# Patient Record
Sex: MALE | Race: BLACK OR AFRICAN AMERICAN | Employment: OTHER | URBAN - METROPOLITAN AREA
[De-identification: names, ages, dates, MRNs, and addresses within clinical notes are randomized per-mention and may not be internally consistent; named-entity substitution may affect disease eponyms.]

---

## 2017-02-28 RX ORDER — SIMVASTATIN 40 MG/1
TABLET, FILM COATED ORAL
Qty: 30 TAB | Refills: 5 | Status: SHIPPED | OUTPATIENT
Start: 2017-02-28 | End: 2017-09-02 | Stop reason: SDUPTHER

## 2017-06-06 ENCOUNTER — OFFICE VISIT (OUTPATIENT)
Dept: CARDIOLOGY CLINIC | Age: 82
End: 2017-06-06

## 2017-06-06 ENCOUNTER — CLINICAL SUPPORT (OUTPATIENT)
Dept: CARDIOLOGY CLINIC | Age: 82
End: 2017-06-06

## 2017-06-06 VITALS
DIASTOLIC BLOOD PRESSURE: 84 MMHG | BODY MASS INDEX: 29.1 KG/M2 | HEIGHT: 67 IN | WEIGHT: 185.4 LBS | OXYGEN SATURATION: 98 % | HEART RATE: 68 BPM | RESPIRATION RATE: 16 BRPM | SYSTOLIC BLOOD PRESSURE: 140 MMHG

## 2017-06-06 DIAGNOSIS — E78.00 PURE HYPERCHOLESTEROLEMIA: ICD-10-CM

## 2017-06-06 DIAGNOSIS — I10 ESSENTIAL HYPERTENSION, BENIGN: ICD-10-CM

## 2017-06-06 DIAGNOSIS — I25.10 CORONARY ARTERY DISEASE INVOLVING NATIVE CORONARY ARTERY OF NATIVE HEART WITHOUT ANGINA PECTORIS: Primary | ICD-10-CM

## 2017-06-06 DIAGNOSIS — I25.10 CORONARY ARTERY DISEASE INVOLVING NATIVE CORONARY ARTERY OF NATIVE HEART WITHOUT ANGINA PECTORIS: ICD-10-CM

## 2017-06-06 DIAGNOSIS — Q24.8 LEFT VENTRICULAR OUTFLOW TRACT OBSTRUCTION: ICD-10-CM

## 2017-06-06 DIAGNOSIS — Z95.1 S/P CABG (CORONARY ARTERY BYPASS GRAFT): ICD-10-CM

## 2017-06-06 NOTE — PROGRESS NOTES
Abril Ramsey     1935       Reny Michaud MD, [facility] [place]  Date of Visit-6/6/2017   PCP is No primary care provider on file. [facility] [place]  [facility]  HPI:  Abril Ramsey is a [de-identified] y.o. male   CAD,HTN. LVOT gradient  Denies chest pain, edema, syncope or shortness of breath at rest   Has no tachycardia , palpitations or sense of arrythmia     Exercise stress echo is normal today, walked 6 minutes  Some EKG changes at baseline non ischemic  Good wall motion  EKG: NSR T wave inversions diffusely  Assessment/Plan:     CAD CABG  ---no angina  RUFINO is good  LVOT gradient with murmur--now asymptomatic on beta blocker  Lipds on statin  HTN at goal  No pcp, declines one  See us one year     Impression:   1. 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 Left ventricular outflow tract obstruction    3. Hypercholesterolemia    4. Essential hypertension, benign    5. S/P CABG (coronary artery bypass graft)       Cardiac History:   heart cath in 2003 showed EF 50-60% with bypass done in 2000 and echocardiogram done in 2001 showing LVH, normal wall motion and no significant valve disease. ECHO 3-= Left ventricle: Systolic function was hyperdynamic. Ejection fraction was  estimated to be 80 %. There was a mid cavity outflow tract obstruction:  rest gradient was 20 mmHg max and valsalva gradient ranged from 80-90 mmHg  max. Septal wall thickness was markedly increased. Tricuspid valve: There was mild regurgitation. COMPARISONS:  Comparison was made with the previous study of 01-Oct-2001. At that time  turbulence in the LVOT was noted but not able to discern in detail, this  study confirms significant dynamic obstructive outflow physiology     ROS-except as noted above. . A complete cardiac and respiratory are reviewed and negative except as above ; Resp-denies wheezing  or productive cough,.  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 He has never used smokeless tobacco. He reports that he does not drink alcohol or use illicit drugs. Exam and Labs:    Visit Vitals    /84 (BP 1 Location: Left arm, BP Patient Position: Sitting)    Pulse 68    Resp 16    Ht 5' 7\" (1.702 m)    Wt 185 lb 6.4 oz (84.1 kg)    SpO2 98%    BMI 29.04 kg/m2      Constitutional:  NAD, comfortable  Head: NC,AT. Eyes: No scleral icterus. Neck:  Neck supple. No JVD present. Throat: moist mucous membranes. Chest: Effort normal & normal respiratory excursion . Neurological: alert, conversant and oriented . Skin: Skin is not cold. No obvious systemic rash noted. Not diaphoretic. No erythema. Psychiatric:  Grossly normal mood and affect. Behavior appears normal. Extremities:  no clubbing or cyanosis. Abdomen: non distended    Lungs:breath sounds normal. No stridor. distress, wheezes or  Rales. Heart:    normal rate and regular rhythm, no gallops noted, systolic murmur 2/6 at 2nd left intercostal space and at 2nd right intercostal space, no JVD , PMI non displaced. Edema: Edema is none. Wt Readings from Last 3 Encounters:   06/06/17 185 lb 6.4 oz (84.1 kg)   12/08/16 185 lb 3.2 oz (84 kg)   06/02/16 185 lb (83.9 kg)      BP Readings from Last 3 Encounters:   06/06/17 140/84   12/08/16 128/46   06/02/16 138/70      Current Outpatient Prescriptions   Medication Sig    simvastatin (ZOCOR) 40 mg tablet TAKE 1 TABLET BY MOUTH AT NIGHT.     metoprolol succinate (TOPROL-XL) 50 mg XL tablet TAKE 1 TABLET BY MOUTH DAILY.  aspirin delayed-release 81 mg tablet Take  by mouth daily. No current facility-administered medications for this visit. Impression see above.

## 2017-07-25 RX ORDER — METOPROLOL SUCCINATE 50 MG/1
TABLET, EXTENDED RELEASE ORAL
Qty: 90 TAB | Refills: 1 | Status: SHIPPED | OUTPATIENT
Start: 2017-07-25 | End: 2018-01-22 | Stop reason: SDUPTHER

## 2017-07-25 NOTE — TELEPHONE ENCOUNTER
Request for metoprolol 50mg every day. Last office visit 6/6/17, next office visit 6/7/18.  Refills per verbal order from Dr. Leona Malik.

## 2018-01-23 RX ORDER — METOPROLOL SUCCINATE 50 MG/1
TABLET, EXTENDED RELEASE ORAL
Qty: 90 TAB | Refills: 1 | Status: SHIPPED | OUTPATIENT
Start: 2018-01-23 | End: 2018-07-23 | Stop reason: SDUPTHER

## 2018-01-23 NOTE — TELEPHONE ENCOUNTER
Request for metoprolol succinate 50 mg XL tablet, daily. Last office visit 6/6/17,   Next office visit 6/7/18.      Refills per verbal order from Dr. Jigar Nowak.

## 2018-03-12 RX ORDER — SIMVASTATIN 40 MG/1
TABLET, FILM COATED ORAL
Qty: 30 TAB | Refills: 5 | Status: SHIPPED | OUTPATIENT
Start: 2018-03-12 | End: 2018-09-08 | Stop reason: SDUPTHER

## 2018-03-12 NOTE — TELEPHONE ENCOUNTER
Request for simvastatin 40 mg tab, nightly. Last office visit 6/6/17,   Next office visit 6/7/18.      Refills per verbal order from Dr. Shi Pool.

## 2018-06-07 ENCOUNTER — OFFICE VISIT (OUTPATIENT)
Dept: CARDIOLOGY CLINIC | Age: 83
End: 2018-06-07

## 2018-06-07 VITALS
RESPIRATION RATE: 16 BRPM | DIASTOLIC BLOOD PRESSURE: 60 MMHG | HEIGHT: 67 IN | SYSTOLIC BLOOD PRESSURE: 120 MMHG | OXYGEN SATURATION: 98 % | BODY MASS INDEX: 26.53 KG/M2 | WEIGHT: 169 LBS

## 2018-06-07 DIAGNOSIS — I25.10 CORONARY ARTERY DISEASE INVOLVING NATIVE CORONARY ARTERY OF NATIVE HEART WITHOUT ANGINA PECTORIS: Primary | ICD-10-CM

## 2018-06-07 DIAGNOSIS — I10 ESSENTIAL HYPERTENSION, BENIGN: ICD-10-CM

## 2018-06-07 DIAGNOSIS — Z95.1 S/P CABG (CORONARY ARTERY BYPASS GRAFT): ICD-10-CM

## 2018-06-07 DIAGNOSIS — Q24.8 LEFT VENTRICULAR OUTFLOW TRACT OBSTRUCTION: ICD-10-CM

## 2018-06-07 DIAGNOSIS — E78.00 PURE HYPERCHOLESTEROLEMIA: ICD-10-CM

## 2018-06-07 NOTE — PROGRESS NOTES
Rickie Easley     1935       Reny Haynes MD, [facility] [place]  Date of Visit-6/7/2018   PCP is No primary care provider on file. [facility] [place]  [facility]  HPI:  Rickie Easley is a 81 y.o. male   CAD hypertension, LVOT gradient. Last visit RUFINO 6 minutes, normal.    Overall the pt states he is doing well. The pt states that he does well exerting himself. He has not had any changes in medications. The pt reports that he is walking 4 laps around the mall regularly and does well with this. 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 Denies chest pain, edema, syncope or shortness of breath at rest, has no tachycardia, palpitations or sense of arrhythmia. EKG: Sinus rhythm with LVH and T wave inversions, unchanged. Assessment/Plan:     1. CAD s/p CABG no angina, tolerating meds well, had a normal stress echo. 2. LVOT gradient, asymptomatic on BB    3. Dyslipidemia on statin he is content on the simvastatin and see no reason to change. 4. Hypertension at goal     5. Declinesto get a  PCP we will check labs, CMP, CBC, TSH and Lipids (he is not fasting for today's labs) it is difficult for him to travel due to his wife's illness. Future Appointments  Date Time Provider Tha Fields   6/6/2019 9:00 AM Ze Tellez  [address]      Patient Instructions   You will need to follow up in clinic with Dr. Jimenez Haynes in 1 year. Please have lab work completed, these need to be done when your fasting. Key CAD CHF Meds             simvastatin (ZOCOR) 40 mg tablet  (Taking) TAKE 1 TABLET BY MOUTH AT NIGHT. metoprolol succinate (TOPROL-XL) 50 mg XL tablet  (Taking) TAKE 1 TABLET EVERY DAY    aspirin delayed-release 81 mg tablet  (Taking) Take  by mouth daily. Impression:   1. 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  Left ventricular outflow tract obstruction    3. Hypercholesterolemia    4. Essential hypertension, benign    5. S/P CABG (coronary artery bypass graft)       Cardiac History:   heart cath in 2003 showed EF 50-60% with bypass done in 2000 and echocardiogram done in 2001 showing LVH, normal wall motion and no significant valve disease. ECHO 3-= Left ventricle: Systolic function was hyperdynamic. Ejection fraction was  estimated to be 80 %. There was a mid cavity outflow tract obstruction:  rest gradient was 20 mmHg max and valsalva gradient ranged from 80-90 mmHg  max. Septal wall thickness was markedly increased. Tricuspid valve: There was mild regurgitation. COMPARISONS:  Comparison was made with the previous study of 01-Oct-2001. At that time  turbulence in the LVOT was noted but not able to discern in detail, this  study confirms significant dynamic obstructive outflow physiology     ROS-except as noted above. . A complete cardiac and respiratory are reviewed and negative except as above ; Resp-denies wheezing  or productive cough,. 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 He has never used smokeless tobacco. He reports that he does not drink alcohol or use illicit drugs. Exam and Labs:  /60 (BP 1 Location: Left arm, BP Patient Position: Sitting)  Resp 16  Ht 5' 7\" (1.702 m)  Wt 169 lb (76.7 kg)  SpO2 98%  BMI 26.47 kg/y0Wwfilucqllzlym:  NAD, comfortable  Head: NC,AT. Eyes: No scleral icterus. Neck:  Neck supple. No JVD present. Throat: moist mucous membranes. Chest: Effort normal & normal respiratory excursion . Neurological: alert, conversant and oriented . Skin: Skin is not cold. No obvious systemic rash noted. Not diaphoretic. No erythema. Psychiatric:  Grossly normal mood and affect. Behavior appears normal. Extremities:  no clubbing or cyanosis. Abdomen: non distended    Lungs:breath sounds normal. No stridor. distress, wheezes or  Rales. Heart:2/6 late peaking holosystolic murmur at the right and left upper sternal border. normal rate, regular rhythm, normal S1, S2, no rubs, clicks or gallops , PMI non displaced. Edema: Edema is none. Lab Results   Component Value Date/Time    Cholesterol, total 146 03/21/2014 08:59 AM    HDL Cholesterol 49 03/21/2014 08:59 AM    LDL, calculated 82 03/21/2014 08:59 AM    Triglyceride 75 03/21/2014 08:59 AM    CHOL/HDL Ratio 2.7 03/30/2010 01:42 PM     Lab Results   Component Value Date/Time    Sodium 137 06/08/2012 07:45 PM    Potassium 4.7 06/08/2012 07:45 PM    Chloride 103 06/08/2012 07:45 PM    CO2 25 06/08/2012 07:45 PM    Anion gap 9 06/08/2012 07:45 PM    Glucose 94 06/08/2012 07:45 PM    BUN 44 (H) 06/08/2012 07:45 PM    Creatinine 2.6 (H) 06/08/2012 07:45 PM    BUN/Creatinine ratio 17 06/08/2012 07:45 PM    GFR est AA 31 (L) 06/08/2012 07:45 PM    GFR est non-AA 26 (L) 06/08/2012 07:45 PM    Calcium 10.2 (H) 06/08/2012 07:45 PM      Wt Readings from Last 3 Encounters:   06/07/18 169 lb (76.7 kg)   06/06/17 185 lb 6.4 oz (84.1 kg)   12/08/16 185 lb 3.2 oz (84 kg)      BP Readings from Last 3 Encounters:   06/07/18 120/60   06/06/17 140/84   12/08/16 128/46      Current Outpatient Prescriptions   Medication Sig    simvastatin (ZOCOR) 40 mg tablet TAKE 1 TABLET BY MOUTH AT NIGHT.  metoprolol succinate (TOPROL-XL) 50 mg XL tablet TAKE 1 TABLET EVERY DAY    aspirin delayed-release 81 mg tablet Take  by mouth daily. No current facility-administered medications for this visit. Impression see above. Written by Tristian Andrade, as dictated by Padmini Harmon MD.

## 2018-06-07 NOTE — PATIENT INSTRUCTIONS
You will need to follow up in clinic with Dr. Shemar Britt in 1 year. Please have lab work completed, these need to be done when your fasting.

## 2018-06-07 NOTE — MR AVS SNAPSHOT
[address] [facility] [address] [facility] [address] 
[phone] Patient: Aisha Iglesias MRN: [id number] [facility]:[birth year] Visit Information Date & Time Provider Department Dept. Phone Encounter #  
 6/7/2018  9:00 AM Susana Conti MD [facility] [phone] [id number] Upcoming Health Maintenance Date Due DTaP/Tdap/Td series (1 - Tdap) 6/10/1957 ZOSTER VACCINE AGE 60> 4/10/1996 GLAUCOMA SCREENING Q2Y 6/10/2001 Pneumococcal 65+ Low/Medium Risk (1 of 2 - PCV13) 6/10/2001 MEDICARE YEARLY EXAM 3/14/2018 Influenza Age 5 to Adult 8/1/2018 Allergies as of 6/7/2018  Review Complete On: 6/7/2018 By: Lex Escalera No Known Allergies Current Immunizations  Never Reviewed No immunizations on file. Not reviewed this visit You Were Diagnosed With   
  
 Codes Comments Coronary artery disease involving native coronary artery of native heart without angina pectoris    -  Primary ICD-10-CM: I25.10 ICD-9-CM: 414.01   
 Pure hypercholesterolemia     ICD-10-CM: E78.00 ICD-9-CM: 272.0 Essential hypertension, benign     ICD-10-CM: I10 
ICD-9-CM: 401.1 Vitals BP Resp Height(growth percentile) Weight(growth percentile) SpO2 BMI  
 120/60 (BP 1 Location: Left arm, BP Patient Position: Sitting) 16 5' 7\" (1.702 m) 169 lb (76.7 kg) 98% 26.47 kg/m2 Smoking Status Never Smoker BMI and BSA Data Body Mass Index Body Surface Area  
 26.47 kg/m 2 1.9 m 2 Preferred Pharmacy Pharmacy Name Phone [id number] [address] [phone] Your Updated Medication List  
  
   
This list is accurate as of 6/7/18  9:36 AM.  Always use your most recent med list.  
  
  
  
  
 aspirin delayed-release 81 mg tablet Take  by mouth daily. metoprolol succinate 50 mg XL tablet Commonly known as:  TOPROL-XL  
TAKE 1 TABLET EVERY DAY  
  
 simvastatin 40 mg tablet Commonly known as:  ZOCOR  
TAKE 1 TABLET BY MOUTH AT NIGHT. We Performed the Following AMB POC EKG ROUTINE W/ 12 LEADS, INTER & REP [41145 CPT(R)] CBC WITH AUTOMATED DIFF [85159 CPT(R)] LIPID PANEL [88925 CPT(R)] METABOLIC PANEL, COMPREHENSIVE [11811 CPT(R)] THYROID PANEL W/TSH [24003 CPT(R)] Patient Instructions You will need to follow up in clinic with Dr. Flory Oconnor in 1 year. Please have lab work completed, these need to be done when your fasting. Introducing [facility]! [facility] introduces [employer] patient portal. Now you can access parts of your medical record, email your doctor's office, and request medication refills online. 1. In your internet browser, go to [email or web address] 2. Click on the First Time User? Click Here link in the Sign In box. You will see the New Member Sign Up page. 3. Enter your [employer] Access Code exactly as it appears below. You will not need to use this code after youve completed the sign-up process. If you do not sign up before the expiration date, you must request a new code. · [employer] Access Code: [email or web address] Expires: 8/30/2018  4:42 PM 
 
4. Enter the last four digits of your Social Security Number (xxxx) and Date of Birth (mm/dd/yyyy) as indicated and click Submit. You will be taken to the next sign-up page. 5. Create a CIDCOt ID. This will be your [employer] login ID and cannot be changed, so think of one that is secure and easy to remember. 6. Create a [employer] password. You can change your password at any time. 7. Enter your Password Reset Question and Answer. This can be used at a later time if you forget your password. 8. Enter your e-mail address. You will receive e-mail notification when new information is available in [address] 9. Click Sign Up. You can now view and download portions of your medical record. 10. Click the Download Summary menu link to download a portable copy of your medical information. If you have questions, please visit the Frequently Asked Questions section of the [employer] website. Remember, [employer] is NOT to be used for urgent needs. For medical emergencies, dial 911. Now available from your iPhone and Android! Please provide this summary of care documentation to your next provider. If you have any questions after today's visit, please call [phone].

## 2018-06-11 ENCOUNTER — HOSPITAL ENCOUNTER (OUTPATIENT)
Dept: LAB | Age: 83
Discharge: HOME | End: 2018-06-11
Payer: MEDICARE

## 2018-06-11 PROCEDURE — 80061 LIPID PANEL: CPT

## 2018-06-11 PROCEDURE — 80053 COMPREHEN METABOLIC PANEL: CPT

## 2018-06-11 PROCEDURE — 84443 ASSAY THYROID STIM HORMONE: CPT

## 2018-06-11 PROCEDURE — 36415 COLL VENOUS BLD VENIPUNCTURE: CPT

## 2018-06-11 PROCEDURE — 85025 COMPLETE CBC W/AUTO DIFF WBC: CPT

## 2018-06-12 ENCOUNTER — TELEPHONE (OUTPATIENT)
Dept: CARDIOLOGY CLINIC | Age: 83
End: 2018-06-12

## 2018-06-12 LAB
ALBUMIN SERPL-MCNC: 4.3 G/DL (ref 3.5–4.7)
ALBUMIN/GLOB SERPL: 2.2 {RATIO} (ref 1.2–2.2)
ALP SERPL-CCNC: 94 IU/L (ref 39–117)
ALT SERPL-CCNC: 16 IU/L (ref 0–44)
AST SERPL-CCNC: 44 IU/L (ref 0–40)
BASOPHILS # BLD AUTO: 0 X10E3/UL (ref 0–0.2)
BASOPHILS NFR BLD AUTO: 0 %
BILIRUB SERPL-MCNC: 0.6 MG/DL (ref 0–1.2)
BUN SERPL-MCNC: 10 MG/DL (ref 8–27)
BUN/CREAT SERPL: 7 (ref 10–24)
CALCIUM SERPL-MCNC: 9.5 MG/DL (ref 8.6–10.2)
CHLORIDE SERPL-SCNC: 104 MMOL/L (ref 96–106)
CHOLEST SERPL-MCNC: 153 MG/DL (ref 100–199)
CO2 SERPL-SCNC: 20 MMOL/L (ref 20–29)
CREAT SERPL-MCNC: 1.39 MG/DL (ref 0.76–1.27)
EOSINOPHIL # BLD AUTO: 0.2 X10E3/UL (ref 0–0.4)
EOSINOPHIL NFR BLD AUTO: 4 %
ERYTHROCYTE [DISTWIDTH] IN BLOOD BY AUTOMATED COUNT: 14.6 % (ref 12.3–15.4)
FT4I SERPL CALC-MCNC: 1.6 (ref 1.2–4.9)
GFR SERPLBLD CREATININE-BSD FMLA CKD-EPI: 47 ML/MIN/1.73
GFR SERPLBLD CREATININE-BSD FMLA CKD-EPI: 54 ML/MIN/1.73
GLOBULIN SER CALC-MCNC: 2 G/DL (ref 1.5–4.5)
GLUCOSE SERPL-MCNC: 86 MG/DL (ref 65–99)
HCT VFR BLD AUTO: 44.1 % (ref 37.5–51)
HDLC SERPL-MCNC: 60 MG/DL
HGB BLD-MCNC: 14.5 G/DL (ref 13–17.7)
IMM GRANULOCYTES # BLD: 0 X10E3/UL (ref 0–0.1)
IMM GRANULOCYTES NFR BLD: 0 %
INTERPRETATION, 910389: NORMAL
INTERPRETATION: NORMAL
LDLC SERPL CALC-MCNC: 74 MG/DL (ref 0–99)
LYMPHOCYTES # BLD AUTO: 2.6 X10E3/UL (ref 0.7–3.1)
LYMPHOCYTES NFR BLD AUTO: 47 %
MCH RBC QN AUTO: 28.8 PG (ref 26.6–33)
MCHC RBC AUTO-ENTMCNC: 32.9 G/DL (ref 31.5–35.7)
MCV RBC AUTO: 88 FL (ref 79–97)
MONOCYTES # BLD AUTO: 0.5 X10E3/UL (ref 0.1–0.9)
MONOCYTES NFR BLD AUTO: 9 %
NEUTROPHILS # BLD AUTO: 2.2 X10E3/UL (ref 1.4–7)
NEUTROPHILS NFR BLD AUTO: 40 %
PDF IMAGE, 910387: NORMAL
PLATELET # BLD AUTO: 168 X10E3/UL (ref 150–379)
POTASSIUM SERPL-SCNC: 5.5 MMOL/L (ref 3.5–5.2)
PROT SERPL-MCNC: 6.3 G/DL (ref 6–8.5)
RBC # BLD AUTO: 5.04 X10E6/UL (ref 4.14–5.8)
SODIUM SERPL-SCNC: 144 MMOL/L (ref 134–144)
T3RU NFR SERPL: 31 % (ref 24–39)
T4 SERPL-MCNC: 5.2 UG/DL (ref 4.5–12)
TRIGL SERPL-MCNC: 97 MG/DL (ref 0–149)
TSH SERPL DL<=0.005 MIU/L-ACNC: 1.76 UIU/ML (ref 0.45–4.5)
VLDLC SERPL CALC-MCNC: 19 MG/DL (ref 5–40)
WBC # BLD AUTO: 5.5 X10E3/UL (ref 3.4–10.8)

## 2018-06-12 NOTE — TELEPHONE ENCOUNTER
----- Message from Padmini Harmon MD sent at 6/12/2018  1:12 PM EDT -----  Labs are good  6/6/2019   9:00 AM    Padmini Harmon MD        [place] [address]

## 2018-06-12 NOTE — LETTER
6/19/2018 4:45 PM 
 
[facility] [address] Dr. Faustino Mcdermott wanted me to let you know that your lab work looked good. I have included a copy of your results. If you have any questions please feel free to contact our office. Thanks, Ten Nelson RN

## 2018-06-19 NOTE — TELEPHONE ENCOUNTER
Attempted to reach patient by telephone. Patient not available. A message was left for return call.      Will mail result letter

## 2018-07-24 RX ORDER — METOPROLOL SUCCINATE 50 MG/1
TABLET, EXTENDED RELEASE ORAL
Qty: 90 TAB | Refills: 1 | Status: SHIPPED | OUTPATIENT
Start: 2018-07-24 | End: 2019-01-16 | Stop reason: SDUPTHER

## 2018-07-24 NOTE — TELEPHONE ENCOUNTER
Requested Prescriptions     Signed Prescriptions Disp Refills    metoprolol succinate (TOPROL-XL) 50 mg XL tablet 90 Tab 1     Sig: TAKE 1 TABLET BY MOUTH EVERY DAY     Authorizing Provider: Arnie Prasad     Ordering User: Jayla Castro    per Dr. Radha Tabares verbal order.

## 2018-09-10 RX ORDER — SIMVASTATIN 40 MG/1
TABLET, FILM COATED ORAL
Qty: 90 TAB | Refills: 3 | Status: SHIPPED | OUTPATIENT
Start: 2018-09-10 | End: 2019-09-18 | Stop reason: SDUPTHER

## 2018-09-10 NOTE — TELEPHONE ENCOUNTER
Requested Prescriptions     Signed Prescriptions Disp Refills    simvastatin (ZOCOR) 40 mg tablet 90 Tab 3     Sig: TAKE 1 TABLET BY MOUTH AT NIGHT. Authorizing Provider: Bruno Avendano     Ordering User: Derrick Treviño     Verbal order per Dr. Flor Braun.  Yearly follow up scheduled on 6-6-19.

## 2018-12-30 NOTE — TELEPHONE ENCOUNTER
Progress Notes by Leonarda Feliciano at 12/14/17 09:26 AM     Author:  Leonarda Feliciano Service:  (none) Author Type:  Patient      Filed:  12/14/17 09:26 AM Encounter Date:  12/12/2017 Status:  Signed     :  Leonarda Feliciano (Patient )            Called patient[KR1.1T] and left 3rd message on voicemail that a member of the household has physician orders for therapy and to call back to schedule appointments @ [phone]. Sent trying to reach you letter.[KR1.1M]           Revision History        User Key Date/Time User Provider Type Action    > KR1.1 12/14/17 09:26 AM Leonarda Feliciano Patient  Sign    M - Manual, T - Template             Verified patient with two types of identifiers. Notified patient of results. Patient verbalizes understanding and has no questions at this time.

## 2019-01-16 DIAGNOSIS — I10 ESSENTIAL HYPERTENSION, BENIGN: ICD-10-CM

## 2019-01-16 DIAGNOSIS — I25.10 CORONARY ARTERY DISEASE INVOLVING NATIVE CORONARY ARTERY OF NATIVE HEART WITHOUT ANGINA PECTORIS: Primary | ICD-10-CM

## 2019-01-18 RX ORDER — METOPROLOL SUCCINATE 50 MG/1
TABLET, EXTENDED RELEASE ORAL
Qty: 90 TAB | Refills: 3 | Status: SHIPPED | OUTPATIENT
Start: 2019-01-18 | End: 2020-01-14

## 2019-01-18 NOTE — TELEPHONE ENCOUNTER
Request for metoprolol 50mg daily. Last office visit 6-7-18, next office visit 6-6-19.  Refills per verbal order from Dr. Rosa Owen.

## 2019-04-26 ENCOUNTER — HOSPITAL ENCOUNTER (OUTPATIENT)
Dept: CT IMAGING | Age: 84
Discharge: HOME | End: 2019-04-26
Attending: FAMILY MEDICINE
Payer: MEDICARE

## 2019-04-26 DIAGNOSIS — R91.8 LUNG MASS: ICD-10-CM

## 2019-04-26 LAB — CREAT BLD-MCNC: 1.4 MG/DL (ref 0.6–1.3)

## 2019-04-26 PROCEDURE — 82565 ASSAY OF CREATININE: CPT

## 2019-04-26 PROCEDURE — 71260 CT THORAX DX C+: CPT

## 2019-04-26 PROCEDURE — 74011636320 HC RX REV CODE- 636/320

## 2019-04-26 RX ORDER — SODIUM CHLORIDE 0.9 % (FLUSH) 0.9 %
10 SYRINGE (ML) INJECTION
Status: COMPLETED | OUTPATIENT
Start: 2019-04-26 | End: 2019-04-26

## 2019-04-26 RX ADMIN — IOPAMIDOL 80 ML: 755 INJECTION, SOLUTION INTRAVENOUS at 14:12

## 2019-04-26 RX ADMIN — Medication 10 ML: at 14:12

## 2019-07-19 ENCOUNTER — OFFICE VISIT (OUTPATIENT)
Dept: CARDIOLOGY CLINIC | Age: 84
End: 2019-07-19

## 2019-07-19 VITALS
WEIGHT: 187.6 LBS | RESPIRATION RATE: 16 BRPM | SYSTOLIC BLOOD PRESSURE: 140 MMHG | DIASTOLIC BLOOD PRESSURE: 90 MMHG | BODY MASS INDEX: 29.44 KG/M2 | OXYGEN SATURATION: 100 % | HEIGHT: 67 IN | HEART RATE: 76 BPM

## 2019-07-19 DIAGNOSIS — I10 ESSENTIAL HYPERTENSION, BENIGN: ICD-10-CM

## 2019-07-19 DIAGNOSIS — Q24.8 LEFT VENTRICULAR OUTFLOW TRACT OBSTRUCTION: ICD-10-CM

## 2019-07-19 DIAGNOSIS — M25.475 BILATERAL SWELLING OF FEET AND ANKLES: ICD-10-CM

## 2019-07-19 DIAGNOSIS — M25.471 BILATERAL SWELLING OF FEET AND ANKLES: ICD-10-CM

## 2019-07-19 DIAGNOSIS — M25.474 BILATERAL SWELLING OF FEET AND ANKLES: ICD-10-CM

## 2019-07-19 DIAGNOSIS — E78.00 PURE HYPERCHOLESTEROLEMIA: ICD-10-CM

## 2019-07-19 DIAGNOSIS — M25.472 BILATERAL SWELLING OF FEET AND ANKLES: ICD-10-CM

## 2019-07-19 DIAGNOSIS — I25.10 CORONARY ARTERY DISEASE INVOLVING NATIVE CORONARY ARTERY OF NATIVE HEART WITHOUT ANGINA PECTORIS: Primary | ICD-10-CM

## 2019-07-19 DIAGNOSIS — Z95.1 S/P CABG (CORONARY ARTERY BYPASS GRAFT): ICD-10-CM

## 2019-07-19 RX ORDER — HYDROCHLOROTHIAZIDE 25 MG/1
25 TABLET ORAL DAILY
Qty: 30 TAB | Refills: 3 | Status: SHIPPED | OUTPATIENT
Start: 2019-07-19

## 2019-07-19 NOTE — PROGRESS NOTES
Visit Vitals  /90 (BP 1 Location: Left arm, BP Patient Position: Sitting)   Pulse 76   Resp 16   Ht 5' 7\" (1.702 m)   Wt 187 lb 9.6 oz (85.1 kg)   SpO2 100%   BMI 29.38 kg/m²

## 2019-07-19 NOTE — PROGRESS NOTES
Irene Polanco     1935       Reny Park MD, [facility] [place]  Date of Visit-7/19/2019   PCP is None   [facility] [place]  [facility]  HPI:  Irene Polanco is a 81 y.o. male   1 year f/u of CAD, HTN, LVOT gradient. Stress echp 2017 at 6 minutes was normal. He has a prominent basal septal wall and some STT findings. He has noticed some swelling in his ankles and feet since about 8-9 months. He has had no medication changes. Denies chest pain, syncope or shortness of breath at rest   Has no tachycardia , palpitations or sense of arrythmia          Assessment/Plan:     1. Coronary artery disease involving native coronary artery of native heart without angina pectoris  Pain free, Hx of CABG, continue ASA and statin. - AMB POC EKG ROUTINE W/ 12 LEADS, INTER & REP  EKG: SR, 1st degree AV block, mild 1mm ST depression and T wave inversions in multiple leads. 2. Essential hypertension, benign  Elevated today, some swelling in legs. Add HCTZ 25mg/d. Check labs in 6 weeks. 3. Bilateral swelling of feet and ankles-possible CHF  Probably relates to LVOT. Will repeat echo. 4. Left ventricular outflow tract obstruction  Echo pend  Continue bb to reduce workload and thus decrease gradient      5. Hypercholesterolemia  On statin. Check fasting lipids. Lab Results   Component Value Date/Time    LDL, calculated 74 06/11/2018 08:44 AM      6. S/P CABG (coronary artery bypass graft)  F/u 2 months. Future Appointments   Date Time Provider Tha Fields   9/13/2019 11:00 AM [facility], [address]   9/13/2019 11:40 AM Barbara Mcadams  [address]      Patient Instructions   Please have labs completed a week prior to follow up appointment. Key CAD CHF Meds             metoprolol succinate (TOPROL-XL) 50 mg XL tablet (Taking) TAKE 1 TABLET BY MOUTH EVERY DAY    simvastatin (ZOCOR) 40 mg tablet (Taking) TAKE 1 TABLET BY MOUTH AT NIGHT. aspirin delayed-release 81 mg tablet (Taking) Take  by mouth daily. Impression:   1. 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 Essential hypertension, benign    3. Bilateral swelling of feet and ankles    4. Left ventricular outflow tract obstruction    5. Hypercholesterolemia    6. S/P CABG (coronary artery bypass graft)       Cardiac History:   heart cath in 2003 showed EF 50-60% with bypass done in 2000 and echocardiogram done in 2001 showing LVH, normal wall motion and no significant valve disease. ECHO 3-= Left ventricle: Systolic function was hyperdynamic. Ejection fraction was  estimated to be 80 %. There was a mid cavity outflow tract obstruction:  rest gradient was 20 mmHg max and valsalva gradient ranged from 80-90 mmHg  max. Septal wall thickness was markedly increased. Tricuspid valve: There was mild regurgitation. COMPARISONS:  Comparison was made with the previous study of 01-Oct-2001. At that time  turbulence in the LVOT was noted but not able to discern in detail, this  study confirms significant dynamic obstructive outflow physiology     ROS-except as noted above. . A complete cardiac and respiratory are reviewed and negative except as above ; Resp-denies wheezing  or productive cough,. 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 He has never used smokeless tobacco. He reports that he does not drink alcohol or use drugs.      Exam and Labs:    Visit Vitals  BP 140/90 (BP 1 Location: Left arm, BP Patient Position: Sitting)   Pulse 76   Resp 16   Ht 5' 7\" (1.702 m)   Wt 187 lb 9.6 oz (85.1 kg)   SpO2 100%   BMI 29.38 kg/m²    @Constitutional:  NAD, comfortable  Head: NC,AT. Eyes: No scleral icterus. Neck:  Neck supple. No JVD present. Throat: moist mucous membranes. Chest: Effort normal & normal respiratory excursion . Neurological: alert, conversant and oriented . Skin: Skin is not cold. No obvious systemic rash noted. Not diaphoretic. No erythema. Psychiatric:  Grossly normal mood and affect. Behavior appears normal. Extremities:  no clubbing or cyanosis +edema. Abdomen: non distended    Lungs:  breath sounds normal. No stridor. distress, wheezes or  Rales. Heart:  normal rate, regular rhythm, normal S1, S2, rubs, clicks or gallops, systolic murmur 2/6 throughout the precordium, PMI non displaced. Edema:  Edema is 2+ at the ankles, 1+ at the lower shins. .  Lab Results   Component Value Date/Time    Cholesterol, total 153 06/11/2018 08:44 AM    HDL Cholesterol 60 06/11/2018 08:44 AM    LDL, calculated 74 06/11/2018 08:44 AM    Triglyceride 97 06/11/2018 08:44 AM    CHOL/HDL Ratio 2.7 03/30/2010 01:42 PM     Lab Results   Component Value Date/Time    Sodium 144 06/11/2018 08:44 AM    Potassium 5.5 (H) 06/11/2018 08:44 AM    Chloride 104 06/11/2018 08:44 AM    CO2 20 06/11/2018 08:44 AM    Anion gap 9 06/08/2012 07:45 PM    Glucose 86 06/11/2018 08:44 AM    BUN 10 06/11/2018 08:44 AM    Creatinine 1.39 (H) 06/11/2018 08:44 AM    BUN/Creatinine ratio 7 (L) 06/11/2018 08:44 AM    GFR est AA 54 (L) 06/11/2018 08:44 AM    GFR est non-AA 47 (L) 06/11/2018 08:44 AM    Calcium 9.5 06/11/2018 08:44 AM      Wt Readings from Last 3 Encounters:   07/19/19 187 lb 9.6 oz (85.1 kg)   06/07/18 169 lb (76.7 kg)   06/06/17 185 lb 6.4 oz (84.1 kg)      BP Readings from Last 3 Encounters:   07/19/19 140/90   06/07/18 120/60   06/06/17 140/84      Current Outpatient Medications   Medication Sig    metoprolol succinate (TOPROL-XL) 50 mg XL tablet TAKE 1 TABLET BY MOUTH EVERY DAY    simvastatin (ZOCOR) 40 mg tablet TAKE 1 TABLET BY MOUTH AT NIGHT.  aspirin delayed-release 81 mg tablet Take  by mouth daily. No current facility-administered medications for this visit. Impression see above.      Written by Mindy Nicholson, as dictated by Jaya Mai MD.

## 2019-07-19 NOTE — Clinical Note
8/3/19 Patient: Bryon Dinh YOB: 1935 Date of Visit: 7/19/2019 None None (395) Patient Stated That They Have No Pcp 
VIA Dear None, Thank you for referring Mr. Bryon Dinh to [address] [place] [address] for evaluation. My notes for this consultation are attached. If you have questions, please do not hesitate to call me. I look forward to following your patient along with you.  
 
 
Sincerely, 
 
Douglas Burgos MD

## 2019-09-10 ENCOUNTER — HOSPITAL ENCOUNTER (OUTPATIENT)
Dept: LAB | Age: 84
Discharge: HOME | End: 2019-09-10
Payer: MEDICARE

## 2019-09-10 PROCEDURE — 80061 LIPID PANEL: CPT

## 2019-09-10 PROCEDURE — 36415 COLL VENOUS BLD VENIPUNCTURE: CPT

## 2019-09-10 PROCEDURE — 85027 COMPLETE CBC AUTOMATED: CPT

## 2019-09-10 PROCEDURE — 80048 BASIC METABOLIC PNL TOTAL CA: CPT

## 2019-09-10 PROCEDURE — 84443 ASSAY THYROID STIM HORMONE: CPT

## 2019-09-11 LAB
BUN SERPL-MCNC: 12 MG/DL (ref 8–27)
BUN/CREAT SERPL: 9 (ref 10–24)
CALCIUM SERPL-MCNC: 9.3 MG/DL (ref 8.6–10.2)
CHLORIDE SERPL-SCNC: 105 MMOL/L (ref 96–106)
CHOLEST SERPL-MCNC: 142 MG/DL (ref 100–199)
CO2 SERPL-SCNC: 26 MMOL/L (ref 20–29)
CREAT SERPL-MCNC: 1.29 MG/DL (ref 0.76–1.27)
ERYTHROCYTE [DISTWIDTH] IN BLOOD BY AUTOMATED COUNT: 13 % (ref 12.3–15.4)
GLUCOSE SERPL-MCNC: 83 MG/DL (ref 65–99)
HCT VFR BLD AUTO: 43.6 % (ref 37.5–51)
HDLC SERPL-MCNC: 58 MG/DL
HGB BLD-MCNC: 14.6 G/DL (ref 13–17.7)
INTERPRETATION, 910389: NORMAL
INTERPRETATION: NORMAL
LDLC SERPL CALC-MCNC: 71 MG/DL (ref 0–99)
MCH RBC QN AUTO: 29.4 PG (ref 26.6–33)
MCHC RBC AUTO-ENTMCNC: 33.5 G/DL (ref 31.5–35.7)
MCV RBC AUTO: 88 FL (ref 79–97)
PDF IMAGE, 910387: NORMAL
PLATELET # BLD AUTO: 172 X10E3/UL (ref 150–450)
POTASSIUM SERPL-SCNC: 5.4 MMOL/L (ref 3.5–5.2)
RBC # BLD AUTO: 4.96 X10E6/UL (ref 4.14–5.8)
SODIUM SERPL-SCNC: 144 MMOL/L (ref 134–144)
TRIGL SERPL-MCNC: 65 MG/DL (ref 0–149)
TSH SERPL DL<=0.005 MIU/L-ACNC: 1.93 UIU/ML (ref 0.45–4.5)
VLDLC SERPL CALC-MCNC: 13 MG/DL (ref 5–40)
WBC # BLD AUTO: 6 X10E3/UL (ref 3.4–10.8)

## 2019-09-13 ENCOUNTER — OFFICE VISIT (OUTPATIENT)
Dept: CARDIOLOGY CLINIC | Age: 84
End: 2019-09-13

## 2019-09-13 VITALS
DIASTOLIC BLOOD PRESSURE: 80 MMHG | RESPIRATION RATE: 16 BRPM | BODY MASS INDEX: 28.56 KG/M2 | OXYGEN SATURATION: 98 % | SYSTOLIC BLOOD PRESSURE: 120 MMHG | HEIGHT: 67 IN | HEART RATE: 61 BPM | WEIGHT: 182 LBS

## 2019-09-13 DIAGNOSIS — Q24.8 LEFT VENTRICULAR OUTFLOW TRACT OBSTRUCTION: ICD-10-CM

## 2019-09-13 DIAGNOSIS — I25.10 CORONARY ARTERY DISEASE INVOLVING NATIVE CORONARY ARTERY OF NATIVE HEART WITHOUT ANGINA PECTORIS: Primary | ICD-10-CM

## 2019-09-13 DIAGNOSIS — M25.475 BILATERAL SWELLING OF FEET AND ANKLES: ICD-10-CM

## 2019-09-13 DIAGNOSIS — M25.474 BILATERAL SWELLING OF FEET AND ANKLES: ICD-10-CM

## 2019-09-13 DIAGNOSIS — M25.472 BILATERAL SWELLING OF FEET AND ANKLES: ICD-10-CM

## 2019-09-13 DIAGNOSIS — E78.00 PURE HYPERCHOLESTEROLEMIA: ICD-10-CM

## 2019-09-13 DIAGNOSIS — M25.471 BILATERAL SWELLING OF FEET AND ANKLES: ICD-10-CM

## 2019-09-13 DIAGNOSIS — I10 ESSENTIAL HYPERTENSION, BENIGN: ICD-10-CM

## 2019-09-13 DIAGNOSIS — Z95.1 S/P CABG (CORONARY ARTERY BYPASS GRAFT): ICD-10-CM

## 2019-09-13 NOTE — PROGRESS NOTES
Visit Vitals  /80 (BP 1 Location: Left arm, BP Patient Position: Sitting)   Pulse 61   Resp 16   Ht 5' 7\" (1.702 m)   Wt 182 lb (82.6 kg)   SpO2 98%   BMI 28.51 kg/m²

## 2019-09-13 NOTE — Clinical Note
9/13/19 Patient: Atif Soto YOB: 1935 Date of Visit: 9/13/2019 None None (395) Patient Stated That They Have No Pcp 
VIA Dear None, Thank you for referring Mr. Atif Soto to [address] [place] [address] for evaluation. My notes for this consultation are attached. If you have questions, please do not hesitate to call me. I look forward to following your patient along with you.  
 
 
Sincerely, 
 
Fide Davis MD

## 2019-09-13 NOTE — PROGRESS NOTES
Atif Soto     1935       Reny Travis MD, [facility] [place]  Date of Visit-9/13/2019   PCP is None   [facility] [place]  [facility]  HPI:  Atif Soto is a 81 y.o. male   2 month f/u of CAD, HTN, LVOT gradient. Stress echo 2017 at 6 minutes was normal. He has a prominent basal septal wall and some STT findings. Overall the pt states he is doing well. He still has some swelling in his ankles and feet. Thyroid, lipids, CBC, and chemistry were normal except potassium at 5.4. Echo done today shows normal pump function, EF 75%, mild to moderate MR. Weight has decreased 5 lbs since last visit.  + lower extremity edema    Denies chest pain, syncope or shortness of breath at rest   Has no tachycardia , palpitations or sense of arrythmia     Assessment/Plan:     1. 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 heart cath in 2003 showed EF 50-60% with bypass done in 2000 and echocardiogram done in 2001 showing LVH, normal wall motion and no significant valve disease. 2. Essential hypertension, benign  Controlled. BP Readings from Last 6 Encounters:   09/13/19 120/80   07/19/19 140/90   06/07/18 120/60   06/06/17 140/84   12/08/16 128/46   06/02/16 138/70       3. Bilateral swelling of feet and ankles  Improved with use of HCTZ. EF is normal.    4. Left ventricular outflow tract obstruction  Review of echo pending for LVOT  Continue BB  ECHO 3-= Left ventricle: Systolic function was hyperdynamic. Ejection fraction  80 %. There was a mid cavity outflow tract obstruction:  rest gradient was 20 mmHg max and Valsalva gradient ranged from 80-90 mmHg  max. Septal wall thickness was markedly increased. Mild TR. Comparison was made with the previous study of 01-Oct-2001.  At that time  turbulence in the LVOT was noted but not able to discern in detail, this  study confirms significant dynamic obstructive outflow physiology. 5. Hypercholesterolemia-secondary prevention  Lab Results   Component Value Date/Time    LDL, calculated 71 09/10/2019 09:03 AM      Key Antihyperlipidemia Meds             simvastatin (ZOCOR) 40 mg tablet (Taking) TAKE 1 TABLET BY MOUTH AT NIGHT. 6. S/P CABG (coronary artery bypass graft)    F/u in 6 months. Future Appointments   Date Time Provider Tha Fields   3/13/2020 10:20 AM Brenda Almeida  [address]      Patient Instructions   You will be scheduled for a 6 month follow up. Key CAD CHF Meds             hydroCHLOROthiazide (HYDRODIURIL) 25 mg tablet (Taking) Take 1 Tab by mouth daily. metoprolol succinate (TOPROL-XL) 50 mg XL tablet (Taking) TAKE 1 TABLET BY MOUTH EVERY DAY    simvastatin (ZOCOR) 40 mg tablet (Taking) TAKE 1 TABLET BY MOUTH AT NIGHT. aspirin delayed-release 81 mg tablet (Taking) Take  by mouth daily. Impression:   1. 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 Essential hypertension, benign    3. Bilateral swelling of feet and ankles    4. Left ventricular outflow tract obstruction    5. Hypercholesterolemia    6. S/P CABG (coronary artery bypass graft)        ROS-except as noted above. . A complete cardiac and respiratory are reviewed and negative except as above ; Resp-denies wheezing  or productive cough,. 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  He has never used smokeless tobacco. He reports that he does not drink alcohol or use drugs. Exam and Labs:    Visit Vitals  /80 (BP 1 Location: Left arm, BP Patient Position: Sitting)   Pulse 61   Resp 16   Ht 5' 7\" (1.702 m)   Wt 182 lb (82.6 kg)   SpO2 98%   BMI 28.51 kg/m²    @Constitutional:  NAD, comfortable  Head: NC,AT. Eyes: No scleral icterus. Neck:  Neck supple. No JVD present. Throat: moist mucous membranes. Chest: Effort normal & normal respiratory excursion . Neurological: alert, conversant and oriented . Skin: Skin is not cold. No obvious systemic rash noted. Not diaphoretic. No erythema. Psychiatric:  Grossly normal mood and affect. Behavior appears normal. Extremities:  no clubbing or cyanosis. Abdomen: non distended    Lungs:  breath sounds normal. No stridor. distress, wheezes or  Rales. Heart:  1/6 LILIANA at the LLSB, normal rate, regular rhythm, normal S1, S2, no rubs, clicks or gallops, PMI non displaced. Edema:  Edema is 1+ to the ankles bilaterally and sock-line.   Lab Results   Component Value Date/Time    Cholesterol, total 142 09/10/2019 09:03 AM    HDL Cholesterol 58 09/10/2019 09:03 AM    LDL, calculated 71 09/10/2019 09:03 AM    Triglyceride 65 09/10/2019 09:03 AM    CHOL/HDL Ratio 2.7 03/30/2010 01:42 PM     Lab Results   Component Value Date/Time    Sodium 144 09/10/2019 09:03 AM    Potassium 5.4 (H) 09/10/2019 09:03 AM    Chloride 105 09/10/2019 09:03 AM    CO2 26 09/10/2019 09:03 AM    Anion gap 9 06/08/2012 07:45 PM    Glucose 83 09/10/2019 09:03 AM    BUN 12 09/10/2019 09:03 AM    Creatinine 1.29 (H) 09/10/2019 09:03 AM    BUN/Creatinine ratio 9 (L) 09/10/2019 09:03 AM    GFR est AA 59 (L) 09/10/2019 09:03 AM    GFR est non-AA 51 (L) 09/10/2019 09:03 AM    Calcium 9.3 09/10/2019 09:03 AM      Wt Readings from Last 3 Encounters:   09/13/19 182 lb (82.6 kg)   09/13/19 182 lb (82.6 kg)   07/19/19 187 lb 9.6 oz (85.1 kg)      BP Readings from Last 3 Encounters:   09/13/19 120/80   07/19/19 140/90   06/07/18 120/60      Current Outpatient Medications   Medication Sig    hydroCHLOROthiazide (HYDRODIURIL) 25 mg tablet Take 1 Tab by mouth daily.  metoprolol succinate (TOPROL-XL) 50 mg XL tablet TAKE 1 TABLET BY MOUTH EVERY DAY    simvastatin (ZOCOR) 40 mg tablet TAKE 1 TABLET BY MOUTH AT NIGHT.  aspirin delayed-release 81 mg tablet Take  by mouth daily. No current facility-administered medications for this visit. Impression see above.      Written by Rafal Jones, as dictated by Douglas Burgos MD.

## 2019-09-15 NOTE — ASSESSMENT & PLAN NOTE
heart cath in 2003 showed EF 50-60% with bypass done in 2000 and echocardiogram done in 2001 showing LVH, normal wall motion and no significant valve disease.

## 2019-09-15 NOTE — ASSESSMENT & PLAN NOTE
ECHO 3-= Left ventricle: Systolic function was hyperdynamic. Ejection fraction  80 %. There was a mid cavity outflow tract obstruction:  rest gradient was 20 mmHg max and Valsalva gradient ranged from 80-90 mmHg  max. Septal wall thickness was markedly increased. Mild TR. Comparison was made with the previous study of 01-Oct-2001. At that time  turbulence in the LVOT was noted but not able to discern in detail, this  study confirms significant dynamic obstructive outflow physiology.

## 2019-09-18 DIAGNOSIS — E78.00 HYPERCHOLESTEROLEMIA: Primary | ICD-10-CM

## 2019-09-18 RX ORDER — SIMVASTATIN 40 MG/1
40 TABLET, FILM COATED ORAL
Qty: 90 TAB | Refills: 3 | Status: SHIPPED | OUTPATIENT
Start: 2019-09-18 | End: 2020-09-22

## 2019-09-18 NOTE — TELEPHONE ENCOUNTER
Request for simvastatin 40mg QHS. Last office visit 9-13-19, next office visit 3-13-20.  Refills per verbal order from Dr. Pal Shrestha.

## 2020-01-13 DIAGNOSIS — I10 ESSENTIAL HYPERTENSION, BENIGN: ICD-10-CM

## 2020-01-13 DIAGNOSIS — I25.10 CORONARY ARTERY DISEASE INVOLVING NATIVE CORONARY ARTERY OF NATIVE HEART WITHOUT ANGINA PECTORIS: ICD-10-CM

## 2020-01-14 RX ORDER — METOPROLOL SUCCINATE 50 MG/1
50 TABLET, EXTENDED RELEASE ORAL DAILY
Qty: 90 TAB | Refills: 3 | Status: SHIPPED | OUTPATIENT
Start: 2020-01-14 | End: 2021-01-19

## 2020-01-14 NOTE — TELEPHONE ENCOUNTER
Request for Toprol 50mg daily. Last office visit 9-13-19, next office visit 3-13-20.  Refills per verbal order from Dr. Arin Mcneill.

## 2020-03-13 ENCOUNTER — OFFICE VISIT (OUTPATIENT)
Dept: CARDIOLOGY CLINIC | Age: 85
End: 2020-03-13

## 2020-03-13 VITALS
RESPIRATION RATE: 19 BRPM | DIASTOLIC BLOOD PRESSURE: 64 MMHG | BODY MASS INDEX: 29.98 KG/M2 | HEIGHT: 67 IN | HEART RATE: 79 BPM | SYSTOLIC BLOOD PRESSURE: 136 MMHG | WEIGHT: 191 LBS | OXYGEN SATURATION: 99 %

## 2020-03-13 DIAGNOSIS — Q24.8 LEFT VENTRICULAR OUTFLOW TRACT OBSTRUCTION: ICD-10-CM

## 2020-03-13 DIAGNOSIS — I10 ESSENTIAL HYPERTENSION, BENIGN: ICD-10-CM

## 2020-03-13 DIAGNOSIS — E78.00 PURE HYPERCHOLESTEROLEMIA: ICD-10-CM

## 2020-03-13 DIAGNOSIS — Z95.1 S/P CABG (CORONARY ARTERY BYPASS GRAFT): ICD-10-CM

## 2020-03-13 DIAGNOSIS — I25.10 CORONARY ARTERY DISEASE INVOLVING NATIVE CORONARY ARTERY OF NATIVE HEART WITHOUT ANGINA PECTORIS: Primary | ICD-10-CM

## 2020-03-13 NOTE — PROGRESS NOTES
Olga Hair     1935       [facility]  Date of Visit-3/13/2020   PCP is None   [facility] [place]  [facility]  HPI:  Olga Hair is a 81 y.o. male   6 month f/u of CAD, HTN, LVOT gradient. 09/13/19   ECHO ADULT COMPLETE 09/17/2019 9/17/2019    Narrative · Left Ventricle: Normal cavity size and systolic function (ejection   fraction normal). Moderate concentric hypertrophy. Mild mid-cavity   obstruction. Estimated left ventricular ejection fraction is >70%. Calculated left ventricular ejection fraction is 75%. Biplane method used   to measure ejection fraction. No regional wall motion abnormality noted. Left ventricular diastolic dysfunction. · Mitral Valve: Mitral valve non-specific thickening. Mild to moderate   mitral valve regurgitation is present. · Tricuspid Valve: Mild tricuspid valve regurgitation is present. · Pulmonary Artery: Mild pulmonary hypertension. Signed by: Vance Conde MD     Overall the pt states he is doing well. He denies recent changes in medications or recent hospitalizations. He also denies any cardiac issues since his last visit. Denies chest pain, edema, syncope or shortness of breath at rest, has no tachycardia, palpitations or sense of arrhythmia. EKG: SR, LVH was strained    Assessment/Plan:     Patient Instructions   We will see you back in 6 months. 1. Coronary artery disease involving native coronary artery of native heart without angina pectoris  Pain-free. Continue BB and ASA. Prior CABG in 2000. Stress echo 2017 normal at 6 minutes. - AMB POC EKG ROUTINE W/ 12 LEADS, INTER & REP    2. Essential hypertension, benign  At goal , meds and possible side effects reviewed and patient denies  Key CAD CHF Meds             metoprolol succinate (TOPROL-XL) 50 mg XL tablet (Taking) Take 1 Tab by mouth daily. simvastatin (ZOCOR) 40 mg tablet (Taking) Take 1 Tab by mouth nightly. hydroCHLOROthiazide (HYDRODIURIL) 25 mg tablet (Taking) Take 1 Tab by mouth daily. aspirin delayed-release 81 mg tablet (Taking) Take  by mouth daily. BP Readings from Last 6 Encounters:   03/13/20 136/64   09/13/19 120/80   07/19/19 140/90   06/07/18 120/60   06/06/17 140/84   12/08/16 128/46          3. Left ventricular outflow tract obstruction  Has hyperdynamic LV function and a dynamic obstructive outlet. Avoid dehydration or tachcyardia  4. Hypercholesterolemia  At goal.  Key Antihyperlipidemia Meds             simvastatin (ZOCOR) 40 mg tablet (Taking) Take 1 Tab by mouth nightly. At goal , denies excess muscle aches or new liver issues  Lab Results   Component Value Date/Time    LDL, calculated 71 09/10/2019 09:03 AM       5. S/P CABG (coronary artery bypass graft)  Future Appointments   Date Time Provider Tha Fields   9/14/2020  9:20 AM Mikayla Lemus  [address]        F/u in 6 months. Impression:   1. 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 Essential hypertension, benign    3. Left ventricular outflow tract obstruction    4. Hypercholesterolemia    5. S/P CABG (coronary artery bypass graft)       Cardiac History:   heart cath in 2003 showed EF 50-60% with bypass done in 2000 and echocardiogram done in 2001 showing LVH, normal wall motion and no significant valve disease. ECHO 3-= Left ventricle:hyperdynamic. LVEF- 80 %. mid cavity outflow tract obstruction:; rest gradient was 20 mmHg max and valsalva gradient ranged from 80-90 mmHg max; Mild TR  Comparison was made with the previous study of 01-Oct-2001. At that time  turbulence in the LVOT was noted but not able to discern in detail, this  study confirms significant dynamic obstructive outflow physiology     ROS-except as noted above. . A complete cardiac and respiratory are reviewed and negative except as above ; Resp-denies wheezing or productive cough,. 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 He has never used smokeless tobacco. He reports that he does not drink alcohol or use drugs. Exam and Labs:  /64 (BP 1 Location: Right arm, BP Patient Position: Sitting)   Pulse 79   Resp 19   Ht 5' 7\" (1.702 m)   Wt 191 lb (86.6 kg)   SpO2 99%   BMI 29.91 kg/m² Constitutional:  NAD, comfortable  Head: NC,AT. Eyes: No scleral icterus. Neck:  Neck supple. No JVD present. Throat: moist mucous membranes. Chest: Effort normal & normal respiratory excursion . Neurological: alert, conversant and oriented . Skin: Skin is not cold. No obvious systemic rash noted. Not diaphoretic. No erythema. Psychiatric:  Grossly normal mood and affect. Behavior appears normal. Extremities:  no clubbing or cyanosis. Abdomen: non distended    Lungs:breath sounds normal. No stridor. distress, wheezes or  Rales. Heart: 2/6 holosystolic murmur heard throughout the precordium normal rate, regular rhythm, normal S1, S2, rubs, clicks or gallops , PMI non displaced. Edema: Edema is sock-line.   Lab Results   Component Value Date/Time    Cholesterol, total 142 09/10/2019 09:03 AM    HDL Cholesterol 58 09/10/2019 09:03 AM    LDL, calculated 71 09/10/2019 09:03 AM    Triglyceride 65 09/10/2019 09:03 AM    CHOL/HDL Ratio 2.7 03/30/2010 01:42 PM     Lab Results   Component Value Date/Time    Sodium 144 09/10/2019 09:03 AM    Potassium 5.4 (H) 09/10/2019 09:03 AM    Chloride 105 09/10/2019 09:03 AM    CO2 26 09/10/2019 09:03 AM    Anion gap 9 06/08/2012 07:45 PM    Glucose 83 09/10/2019 09:03 AM    BUN 12 09/10/2019 09:03 AM    Creatinine 1.29 (H) 09/10/2019 09:03 AM    BUN/Creatinine ratio 9 (L) 09/10/2019 09:03 AM    GFR est AA 59 (L) 09/10/2019 09:03 AM    GFR est non-AA 51 (L) 09/10/2019 09:03 AM    Calcium 9.3 09/10/2019 09:03 AM      Wt Readings from Last 3 Encounters:   03/13/20 191 lb (86.6 kg)   09/13/19 182 lb (82.6 kg)   09/13/19 182 lb (82.6 kg)      BP Readings from Last 3 Encounters:   03/13/20 136/64   09/13/19 120/80   07/19/19 140/90      Current Outpatient Medications   Medication Sig    metoprolol succinate (TOPROL-XL) 50 mg XL tablet Take 1 Tab by mouth daily.  simvastatin (ZOCOR) 40 mg tablet Take 1 Tab by mouth nightly.  hydroCHLOROthiazide (HYDRODIURIL) 25 mg tablet Take 1 Tab by mouth daily.  aspirin delayed-release 81 mg tablet Take  by mouth daily. No current facility-administered medications for this visit. Impression see above.       Written by Effie Rader, as dictated by Bridget Gillespie MD.

## 2020-03-13 NOTE — PROGRESS NOTES
Visit Vitals  /64 (BP 1 Location: Right arm, BP Patient Position: Sitting)   Pulse 79   Resp 19   Ht 5' 7\" (1.702 m)   Wt 191 lb (86.6 kg)   SpO2 99%   BMI 29.91 kg/m²

## 2020-03-13 NOTE — Clinical Note
3/13/20 Patient: Jamie Chacon YOB: 1935 Date of Visit: 3/13/2020 None None (395) Patient Stated That They Have No Pcp 
VIA Dear None, Thank you for referring Mr. Jamie Chacon to [address] for evaluation. My notes for this consultation are attached. If you have questions, please do not hesitate to call me. I look forward to following your patient along with you.  
 
 
Sincerely, 
 
Maria Fernanda Youngblood MD

## 2020-09-14 ENCOUNTER — OFFICE VISIT (OUTPATIENT)
Dept: CARDIOLOGY CLINIC | Age: 85
End: 2020-09-14
Payer: MEDICARE

## 2020-09-14 VITALS
WEIGHT: 196.2 LBS | HEIGHT: 67 IN | BODY MASS INDEX: 30.79 KG/M2 | OXYGEN SATURATION: 98 % | HEART RATE: 77 BPM | DIASTOLIC BLOOD PRESSURE: 60 MMHG | RESPIRATION RATE: 18 BRPM | SYSTOLIC BLOOD PRESSURE: 130 MMHG

## 2020-09-14 DIAGNOSIS — Q24.8 LEFT VENTRICULAR OUTFLOW TRACT OBSTRUCTION: ICD-10-CM

## 2020-09-14 DIAGNOSIS — I10 ESSENTIAL HYPERTENSION, BENIGN: ICD-10-CM

## 2020-09-14 DIAGNOSIS — I25.10 CORONARY ARTERY DISEASE INVOLVING NATIVE CORONARY ARTERY OF NATIVE HEART WITHOUT ANGINA PECTORIS: Primary | ICD-10-CM

## 2020-09-14 DIAGNOSIS — Z95.1 S/P CABG (CORONARY ARTERY BYPASS GRAFT): ICD-10-CM

## 2020-09-14 DIAGNOSIS — E78.00 HYPERCHOLESTEROLEMIA: ICD-10-CM

## 2020-09-14 PROCEDURE — G8417 CALC BMI ABV UP PARAM F/U: HCPCS | Performed by: SPECIALIST

## 2020-09-14 PROCEDURE — G8536 NO DOC ELDER MAL SCRN: HCPCS | Performed by: SPECIALIST

## 2020-09-14 PROCEDURE — G8752 SYS BP LESS 140: HCPCS | Performed by: SPECIALIST

## 2020-09-14 PROCEDURE — 1101F PT FALLS ASSESS-DOCD LE1/YR: CPT | Performed by: SPECIALIST

## 2020-09-14 PROCEDURE — G8427 DOCREV CUR MEDS BY ELIG CLIN: HCPCS | Performed by: SPECIALIST

## 2020-09-14 PROCEDURE — G8754 DIAS BP LESS 90: HCPCS | Performed by: SPECIALIST

## 2020-09-14 PROCEDURE — 99214 OFFICE O/P EST MOD 30 MIN: CPT | Performed by: SPECIALIST

## 2020-09-14 PROCEDURE — G0463 HOSPITAL OUTPT CLINIC VISIT: HCPCS | Performed by: SPECIALIST

## 2020-09-14 PROCEDURE — G8432 DEP SCR NOT DOC, RNG: HCPCS | Performed by: SPECIALIST

## 2020-09-14 NOTE — PROGRESS NOTES
Prince Roland     1935       Reny Sahni MD, [facility]  Date of Visit-2020   PCP is None   [facility] [place]  [facility]  HPI:  Prince Roland is a 81 y.o. male   6 month f/u of CAD, HTN, LVOT gradient. Overall the pt states he is doing well. Denies chest pain, edema, syncope or shortness of breath at rest, has no tachycardia, palpitations or sense of arrhythmia. He does not follow with a PCP. [id number]   ECHO ADULT COMPLETE 2019    Narrative · Left Ventricle: Normal cavity size and systolic function (ejection   fraction normal). Moderate concentric hypertrophy. Mild mid-cavity   obstruction. Estimated left ventricular ejection fraction is >70%. Calculated left ventricular ejection fraction is 75%. Biplane method used   to measure ejection fraction. No regional wall motion abnormality noted. Left ventricular diastolic dysfunction. · Mitral Valve: Mitral valve non-specific thickening. Mild to moderate   mitral valve regurgitation is present. · Tricuspid Valve: Mild tricuspid valve regurgitation is present. · Pulmonary Artery: Mild pulmonary hypertension. Signed by: Caridad Monroe MD         Assessment/Plan:     1. Coronary artery disease involving native coronary artery of native heart without angina pectoris  Pain-free. Continue BB and ASA. Prior CABG in . Stress echo  normal at 6 minutes. 2. Essential hypertension, benign  Stable at goal.   Prev edema and responded to HCTZ  At goal , meds and possible side effects reviewed and patient denies  Key CAD CHF Meds             metoprolol succinate (TOPROL-XL) 50 mg XL tablet (Taking) Take 1 Tab by mouth daily. simvastatin (ZOCOR) 40 mg tablet (Taking) Take 1 Tab by mouth nightly. hydroCHLOROthiazide (HYDRODIURIL) 25 mg tablet (Taking) Take 1 Tab by mouth daily. aspirin delayed-release 81 mg tablet (Taking) Take  by mouth daily.          BP Readings from Last 6 Encounters:   09/14/20 130/60   03/13/20 136/64   09/13/19 120/80   07/19/19 140/90   06/07/18 120/60   06/06/17 140/84          3. Left ventricular outflow tract obstruction  Has hyperdynamic LV function and a dynamic obstructive outlet. Avoid dehydration or tachcyardia. Murmur unchanged and he has a mid-cavity obstruction. See echo above. 4. Hypercholesterolemia  Does not see any other doctors. I will check blood work including lipids. At goal , denies excess muscle aches or new liver issues  Key Antihyperlipidemia Meds             simvastatin (ZOCOR) 40 mg tablet (Taking) Take 1 Tab by mouth nightly. Lab Results   Component Value Date/Time    LDL, calculated 71 09/10/2019 09:03 AM       5. S/P CABG (coronary artery bypass graft)    F/u in 6 months. Impression:   1. 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 Essential hypertension, benign    3. Left ventricular outflow tract obstruction    4. Hypercholesterolemia    5. S/P CABG (coronary artery bypass graft)       Cardiac History:   PHI: Manjula Cea  heart cath in 2003 showed EF 50-60% with bypass done in 2000 and echocardiogram done in 2001 showing LVH, normal wall motion and no significant valve disease. ECHO 3-= Left ventricle:hyperdynamic. LVEF- 80 %. mid cavity outflow tract obstruction:; rest gradient was 20 mmHg max and valsalva gradient ranged from 80-90 mmHg max; Mild TR  Comparison was made with the previous study of 01-Oct-2001. At that time  turbulence in the LVOT was noted but not able to discern in detail, this  study confirms significant dynamic obstructive outflow physiology     ROS-except as noted above. . A complete cardiac and respiratory are reviewed and negative except as above ; Resp-denies wheezing  or productive cough,.  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 He has never used smokeless tobacco. He reports that he does not drink alcohol or use drugs. Exam and Labs:  /60 (BP 1 Location: Left arm, BP Patient Position: Sitting)   Pulse 77   Resp 18   Ht 5' 7\" (1.702 m)   Wt 196 lb 3.2 oz (89 kg)   SpO2 98%   BMI 30.73 kg/m² Constitutional:  NAD, comfortable  Head: NC,AT. Eyes: No scleral icterus. Neck:  Neck supple. No JVD present. Throat: moist mucous membranes. Chest: Effort normal & normal respiratory excursion . Neurological: alert, conversant and oriented . Skin: Skin is not cold. No obvious systemic rash noted. Not diaphoretic. No erythema. Psychiatric:  Grossly normal mood and affect. Behavior appears normal. Extremities:  no clubbing or cyanosis. Abdomen: non distended    Lungs:breath sounds normal. No stridor. distress, wheezes or  Rales. IOICD:1/7 short systolic murmur normal rate, regular rhythm, normal S1, S2, no rubs, clicks or gallops , PMI non displaced. Edema: Edema is none.   Lab Results   Component Value Date/Time    Cholesterol, total 142 09/10/2019 09:03 AM    HDL Cholesterol 58 09/10/2019 09:03 AM    LDL, calculated 71 09/10/2019 09:03 AM    Triglyceride 65 09/10/2019 09:03 AM    CHOL/HDL Ratio 2.7 03/30/2010 01:42 PM     Lab Results   Component Value Date/Time    Sodium 144 09/10/2019 09:03 AM    Potassium 5.4 (H) 09/10/2019 09:03 AM    Chloride 105 09/10/2019 09:03 AM    CO2 26 09/10/2019 09:03 AM    Anion gap 9 06/08/2012 07:45 PM    Glucose 83 09/10/2019 09:03 AM    BUN 12 09/10/2019 09:03 AM    Creatinine 1.29 (H) 09/10/2019 09:03 AM BUN/Creatinine ratio 9 (L) 09/10/2019 09:03 AM    GFR est AA 59 (L) 09/10/2019 09:03 AM    GFR est non-AA 51 (L) 09/10/2019 09:03 AM    Calcium 9.3 09/10/2019 09:03 AM      Wt Readings from Last 3 Encounters:   09/14/20 196 lb 3.2 oz (89 kg)   03/13/20 191 lb (86.6 kg)   09/13/19 182 lb (82.6 kg)      BP Readings from Last 3 Encounters:   09/14/20 130/60   03/13/20 136/64   09/13/19 120/80      Current Outpatient Medications   Medication Sig    metoprolol succinate (TOPROL-XL) 50 mg XL tablet Take 1 Tab by mouth daily.  simvastatin (ZOCOR) 40 mg tablet Take 1 Tab by mouth nightly.  hydroCHLOROthiazide (HYDRODIURIL) 25 mg tablet Take 1 Tab by mouth daily.  aspirin delayed-release 81 mg tablet Take  by mouth daily. No current facility-administered medications for this visit. Impression see above.       Written by Lloyd Gordillo, as dictated by Sebastian Trejo MD.

## 2020-09-14 NOTE — PROGRESS NOTES
Visit Vitals  /60 (BP 1 Location: Left arm, BP Patient Position: Sitting)   Pulse 77   Resp 18   Ht 5' 7\" (1.702 m)   Wt 196 lb 3.2 oz (89 kg)   SpO2 98%   BMI 30.73 kg/m²

## 2020-09-14 NOTE — PATIENT INSTRUCTIONS
Please get your fasting blood work completed. We will call with results and see you back in 6 months.

## 2020-09-15 ENCOUNTER — HOSPITAL ENCOUNTER (OUTPATIENT)
Dept: LAB | Age: 85
Discharge: HOME | End: 2020-09-15
Payer: MEDICARE

## 2020-09-15 PROCEDURE — 80061 LIPID PANEL: CPT

## 2020-09-15 PROCEDURE — 84443 ASSAY THYROID STIM HORMONE: CPT

## 2020-09-15 PROCEDURE — 80053 COMPREHEN METABOLIC PANEL: CPT

## 2020-09-15 PROCEDURE — 36415 COLL VENOUS BLD VENIPUNCTURE: CPT

## 2020-09-16 LAB
ALBUMIN SERPL-MCNC: 4.5 G/DL (ref 3.6–4.6)
ALBUMIN/GLOB SERPL: 1.7 {RATIO} (ref 1.2–2.2)
ALP SERPL-CCNC: 110 IU/L (ref 39–117)
ALT SERPL-CCNC: 11 IU/L (ref 0–44)
AST SERPL-CCNC: 24 IU/L (ref 0–40)
BASOPHILS # BLD AUTO: 0 X10E3/UL (ref 0–0.2)
BASOPHILS NFR BLD AUTO: 1 %
BILIRUB SERPL-MCNC: 0.6 MG/DL (ref 0–1.2)
BUN SERPL-MCNC: 8 MG/DL (ref 8–27)
BUN/CREAT SERPL: 6 (ref 10–24)
CALCIUM SERPL-MCNC: 10 MG/DL (ref 8.6–10.2)
CHLORIDE SERPL-SCNC: 105 MMOL/L (ref 96–106)
CHOLEST SERPL-MCNC: 157 MG/DL (ref 100–199)
CO2 SERPL-SCNC: 19 MMOL/L (ref 20–29)
CREAT SERPL-MCNC: 1.41 MG/DL (ref 0.76–1.27)
EOSINOPHIL # BLD AUTO: 0.1 X10E3/UL (ref 0–0.4)
EOSINOPHIL NFR BLD AUTO: 2 %
ERYTHROCYTE [DISTWIDTH] IN BLOOD BY AUTOMATED COUNT: 13.4 % (ref 11.6–15.4)
GLOBULIN SER CALC-MCNC: 2.7 G/DL (ref 1.5–4.5)
GLUCOSE SERPL-MCNC: 88 MG/DL (ref 65–99)
HCT VFR BLD AUTO: 49.2 % (ref 37.5–51)
HDLC SERPL-MCNC: 56 MG/DL
HGB BLD-MCNC: 16.6 G/DL (ref 13–17.7)
IMM GRANULOCYTES # BLD AUTO: 0 X10E3/UL (ref 0–0.1)
IMM GRANULOCYTES NFR BLD AUTO: 0 %
INTERPRETATION, 910389: NORMAL
INTERPRETATION: NORMAL
LDLC SERPL CALC-MCNC: 81 MG/DL (ref 0–99)
LYMPHOCYTES # BLD AUTO: 2.4 X10E3/UL (ref 0.7–3.1)
LYMPHOCYTES NFR BLD AUTO: 41 %
MCH RBC QN AUTO: 29.6 PG (ref 26.6–33)
MCHC RBC AUTO-ENTMCNC: 33.7 G/DL (ref 31.5–35.7)
MCV RBC AUTO: 88 FL (ref 79–97)
MONOCYTES # BLD AUTO: 0.6 X10E3/UL (ref 0.1–0.9)
MONOCYTES NFR BLD AUTO: 11 %
NEUTROPHILS # BLD AUTO: 2.6 X10E3/UL (ref 1.4–7)
NEUTROPHILS NFR BLD AUTO: 45 %
PDF IMAGE, 910387: NORMAL
PLATELET # BLD AUTO: 170 X10E3/UL (ref 150–450)
POTASSIUM SERPL-SCNC: 5.5 MMOL/L (ref 3.5–5.2)
PROT SERPL-MCNC: 7.2 G/DL (ref 6–8.5)
RBC # BLD AUTO: 5.6 X10E6/UL (ref 4.14–5.8)
SODIUM SERPL-SCNC: 139 MMOL/L (ref 134–144)
TRIGL SERPL-MCNC: 114 MG/DL (ref 0–149)
TSH SERPL DL<=0.005 MIU/L-ACNC: 2.03 UIU/ML (ref 0.45–4.5)
VLDLC SERPL CALC-MCNC: 20 MG/DL (ref 5–40)
WBC # BLD AUTO: 5.8 X10E3/UL (ref 3.4–10.8)

## 2020-09-20 DIAGNOSIS — E78.00 HYPERCHOLESTEROLEMIA: ICD-10-CM

## 2020-09-22 RX ORDER — SIMVASTATIN 40 MG/1
40 TABLET, FILM COATED ORAL
Qty: 90 TAB | Refills: 3 | Status: SHIPPED | OUTPATIENT
Start: 2020-09-22 | End: 2021-10-01

## 2020-09-22 NOTE — TELEPHONE ENCOUNTER
Request for Simvastatin 40mg QHS  Last office visit 9-14-20, next office visit 3-15-21.  Refills per verbal order from Dr. Jeanie Henriquez.

## 2020-09-22 NOTE — PROGRESS NOTES
Labs stable  K little high side, avoid bananas, less potatoes, recheck BMP in 2 months  No anemia, CBC is good  Kidney function stable for age  Thyroid is good  Cholesterol is very good  Future Appointments  3/15/2021  9:00 AM    Reny Wade MD [facility]

## 2020-09-30 DIAGNOSIS — I25.10 CORONARY ARTERY DISEASE INVOLVING NATIVE CORONARY ARTERY OF NATIVE HEART WITHOUT ANGINA PECTORIS: Primary | ICD-10-CM

## 2020-09-30 NOTE — PROGRESS NOTES
Two patient identifiers verified. Per MD patient called and given results. Patient instructed to avoid high potassium foods and repeat blood work before Thanksgiving. Lab slip mailed. Patient verbalized understanding and denies any further questions or concerns at this time.

## 2021-01-16 DIAGNOSIS — I25.10 CORONARY ARTERY DISEASE INVOLVING NATIVE CORONARY ARTERY OF NATIVE HEART WITHOUT ANGINA PECTORIS: ICD-10-CM

## 2021-01-16 DIAGNOSIS — I10 ESSENTIAL HYPERTENSION, BENIGN: ICD-10-CM

## 2021-01-19 RX ORDER — METOPROLOL SUCCINATE 50 MG/1
50 TABLET, EXTENDED RELEASE ORAL DAILY
Qty: 90 TAB | Refills: 3 | Status: SHIPPED | OUTPATIENT
Start: 2021-01-19 | End: 2022-01-10

## 2021-03-29 ENCOUNTER — OFFICE VISIT (OUTPATIENT)
Dept: CARDIOLOGY CLINIC | Age: 86
End: 2021-03-29
Payer: MEDICARE

## 2021-03-29 VITALS
DIASTOLIC BLOOD PRESSURE: 80 MMHG | WEIGHT: 194 LBS | RESPIRATION RATE: 16 BRPM | HEIGHT: 67 IN | BODY MASS INDEX: 30.45 KG/M2 | OXYGEN SATURATION: 98 % | HEART RATE: 81 BPM | SYSTOLIC BLOOD PRESSURE: 120 MMHG

## 2021-03-29 DIAGNOSIS — Z95.1 S/P CABG (CORONARY ARTERY BYPASS GRAFT): ICD-10-CM

## 2021-03-29 DIAGNOSIS — Q24.8 LEFT VENTRICULAR OUTFLOW TRACT OBSTRUCTION: ICD-10-CM

## 2021-03-29 DIAGNOSIS — I25.10 CORONARY ARTERY DISEASE INVOLVING NATIVE CORONARY ARTERY OF NATIVE HEART WITHOUT ANGINA PECTORIS: Primary | ICD-10-CM

## 2021-03-29 DIAGNOSIS — I10 ESSENTIAL HYPERTENSION, BENIGN: ICD-10-CM

## 2021-03-29 DIAGNOSIS — E78.00 HYPERCHOLESTEROLEMIA: ICD-10-CM

## 2021-03-29 PROCEDURE — 1101F PT FALLS ASSESS-DOCD LE1/YR: CPT | Performed by: SPECIALIST

## 2021-03-29 PROCEDURE — G8510 SCR DEP NEG, NO PLAN REQD: HCPCS | Performed by: SPECIALIST

## 2021-03-29 PROCEDURE — 93010 ELECTROCARDIOGRAM REPORT: CPT | Performed by: SPECIALIST

## 2021-03-29 PROCEDURE — G0463 HOSPITAL OUTPT CLINIC VISIT: HCPCS | Performed by: SPECIALIST

## 2021-03-29 PROCEDURE — G8417 CALC BMI ABV UP PARAM F/U: HCPCS | Performed by: SPECIALIST

## 2021-03-29 PROCEDURE — G8427 DOCREV CUR MEDS BY ELIG CLIN: HCPCS | Performed by: SPECIALIST

## 2021-03-29 PROCEDURE — G8752 SYS BP LESS 140: HCPCS | Performed by: SPECIALIST

## 2021-03-29 PROCEDURE — G8536 NO DOC ELDER MAL SCRN: HCPCS | Performed by: SPECIALIST

## 2021-03-29 PROCEDURE — G8754 DIAS BP LESS 90: HCPCS | Performed by: SPECIALIST

## 2021-03-29 PROCEDURE — 93005 ELECTROCARDIOGRAM TRACING: CPT | Performed by: SPECIALIST

## 2021-03-29 PROCEDURE — 99214 OFFICE O/P EST MOD 30 MIN: CPT | Performed by: SPECIALIST

## 2021-03-29 NOTE — PROGRESS NOTES
Kt Jessica     1935       Reny Parish MD, [facility] [place]  Date of Visit-3/29/2021   PCP is None   [facility] [place]  [facility]  HPI:  Kt Jessica is a 81 y.o. male   6 month f/u of CAD, HTN, LVOT gradient. 09/13/19   ECHO ADULT COMPLETE 09/17/2019 9/17/2019    Narrative · Left Ventricle: Normal cavity size and systolic function (ejection   fraction normal). Moderate concentric hypertrophy. Mild mid-cavity   obstruction. Estimated left ventricular ejection fraction is >70%. Calculated left ventricular ejection fraction is 75%. Biplane method used   to measure ejection fraction. No regional wall motion abnormality noted. Left ventricular diastolic dysfunction. · Mitral Valve: Mitral valve non-specific thickening. Mild to moderate   mitral valve regurgitation is present. · Tricuspid Valve: Mild tricuspid valve regurgitation is present. · Pulmonary Artery: Mild pulmonary hypertension. Signed by: Jojo Sanchez MD      From last visit:  labs stable  K little high side, avoid bananas, less potatoes, recheck BMP in 2 months  No anemia, CBC is good  Kidney function stable for age  Thyroid is good  Cholesterol is very good    Overall the pt states he is doing well. He walks around [facility] for an hour to an hour and a half every morning. Denies chest pain, edema, syncope or shortness of breath at rest, has no tachycardia, palpitations or sense of arrhythmia. EKG: Sinus  Rhythm  -First degree A-V block  - frequent PAC s   Brian = 238 # PACs = 3Wandering atrial pacemaker  -  Extensive T-abnormality  - Anterior/lateral and inferior ischemia. Assessment/Plan:     Patient Instructions   Please have your blood work completed. We will call with results and see you back in 6 months. Future Appointments   Date Time Provider Tha Fields   9/27/2021 10:20 AM Reny Wade MD [facility]       1.  Coronary artery disease involving native coronary artery of native heart without angina pectoris  Pain-free. Continue BB and ASA. Prior CABG in 2000. Stress echo 2017 normal at 6 minutes. 2. Essential hypertension, benign  Stable at goal.   Prev edema and responded to HCTZ  At goal , meds and possible side effects reviewed and patient denies  - AMB POC EKG ROUTINE W/ 12 LEADS, INTER & REP  At goal , meds and possible side effects reviewed and patient denies  Key CAD CHF Meds             metoprolol succinate (TOPROL-XL) 50 mg XL tablet (Taking) Take 1 Tab by mouth daily. simvastatin (ZOCOR) 40 mg tablet (Taking) Take 1 Tab by mouth nightly. hydroCHLOROthiazide (HYDRODIURIL) 25 mg tablet (Taking) Take 1 Tab by mouth daily. aspirin delayed-release 81 mg tablet (Taking) Take  by mouth daily. BP Readings from Last 6 Encounters:   03/29/21 120/80   09/14/20 130/60   03/13/20 136/64   09/13/19 120/80   07/19/19 140/90   06/07/18 120/60        3. Left ventricular outflow tract obstruction  Has hyperdynamic LV function and a dynamic obstructive outlet. Avoid dehydration or tachcyardia. Murmur unchanged and he has a mid-cavity obstruction. See echo above. 4. Hypercholesterolemia  Lipids are at goal. Other labs we are doing because he does not have a PCP and declines to get one. Potassium needs a recheck and I will have the nurse contact him. At goal , denies excess muscle aches or new liver issues  Key Antihyperlipidemia Meds             simvastatin (ZOCOR) 40 mg tablet (Taking) Take 1 Tab by mouth nightly. Lab Results   Component Value Date/Time    LDL, calculated 81 09/15/2020 09:09 AM    LDL, calculated 71 09/10/2019 09:03 AM       5. S/P CABG (coronary artery bypass graft)     F/u in 6 months     Impression:   1. 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 Essential hypertension, benign    3. Left ventricular outflow tract obstruction    4.  Hypercholesterolemia 5. S/P CABG (coronary artery bypass graft)       Cardiac History:   PHI: [facility]  heart cath in 2003 showed EF 50-60% with bypass done in 2000 and echocardiogram done in 2001 showing LVH, normal wall motion and no significant valve disease. ECHO 3-= Left ventricle:hyperdynamic. LVEF- 80 %. mid cavity outflow tract obstruction:; rest gradient was 20 mmHg max and valsalva gradient ranged from 80-90 mmHg max; Mild TR  Comparison was made with the previous study of 01-Oct-2001. At that time  turbulence in the LVOT was noted but not able to discern in detail, this  study confirms significant dynamic obstructive outflow physiology     ROS-except as noted above. . A complete cardiac and respiratory are reviewed and negative except as above ; Resp-denies wheezing  or productive cough,. 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 He has never used smokeless tobacco. He reports that he does not drink alcohol or use drugs. Exam and Labs:  /80 (BP 1 Location: Left arm, BP Patient Position: Sitting, BP Cuff Size: Adult)   Pulse 81   Resp 16   Ht 5' 7\" (1.702 m)   Wt 194 lb (88 kg)   SpO2 98%   BMI 30.38 kg/m² Constitutional:  NAD, comfortable  Head: NC,AT. Eyes: No scleral icterus. Neck:  Neck supple. No JVD present. Throat: moist mucous membranes. Chest: Effort normal & normal respiratory excursion . Neurological: alert, conversant and oriented . Skin: Skin is not cold. No obvious systemic rash noted. Not diaphoretic. No erythema. Psychiatric:  Grossly normal mood and affect. Behavior appears normal. Extremities:  no clubbing or cyanosis. Abdomen: non distended    Lungs:breath sounds normal. No stridor. distress, wheezes or  Rales. HDCYF:9/6 short systolic murmur at the apex normal rate, regular rhythm, normal S1, S2, no murmurs, rubs, clicks or gallops , PMI non displaced. Edema: Edema is none. Lab Results   Component Value Date/Time    Cholesterol, total 157 09/15/2020 09:09 AM    HDL Cholesterol 56 09/15/2020 09:09 AM    LDL, calculated 81 09/15/2020 09:09 AM    LDL, calculated 71 09/10/2019 09:03 AM    Triglyceride 114 09/15/2020 09:09 AM    CHOL/HDL Ratio 2.7 03/30/2010 01:42 PM     Lab Results   Component Value Date/Time    Sodium 139 09/15/2020 09:09 AM    Potassium 5.5 (H) 09/15/2020 09:09 AM    Chloride 105 09/15/2020 09:09 AM    CO2 19 (L) 09/15/2020 09:09 AM    Anion gap 9 06/08/2012 07:45 PM    Glucose 88 09/15/2020 09:09 AM    BUN 8 09/15/2020 09:09 AM    Creatinine 1.41 (H) 09/15/2020 09:09 AM    BUN/Creatinine ratio 6 (L) 09/15/2020 09:09 AM    GFR est AA 53 (L) 09/15/2020 09:09 AM    GFR est non-AA 45 (L) 09/15/2020 09:09 AM    Calcium 10.0 09/15/2020 09:09 AM      Wt Readings from Last 3 Encounters:   03/29/21 194 lb (88 kg)   09/14/20 196 lb 3.2 oz (89 kg)   03/13/20 191 lb (86.6 kg)      BP Readings from Last 3 Encounters:   03/29/21 120/80   09/14/20 130/60   03/13/20 136/64      Current Outpatient Medications   Medication Sig    metoprolol succinate (TOPROL-XL) 50 mg XL tablet Take 1 Tab by mouth daily.  simvastatin (ZOCOR) 40 mg tablet Take 1 Tab by mouth nightly.  hydroCHLOROthiazide (HYDRODIURIL) 25 mg tablet Take 1 Tab by mouth daily.  aspirin delayed-release 81 mg tablet Take  by mouth daily. No current facility-administered medications for this visit. Impression see above.       Written by Monica Singleton, as dictated by Analisa Call MD.

## 2021-03-29 NOTE — PROGRESS NOTES
Visit Vitals  /80 (BP 1 Location: Left arm, BP Patient Position: Sitting, BP Cuff Size: Adult)   Pulse 81   Resp 16   Ht 5' 7\" (1.702 m)   Wt 194 lb (88 kg)   SpO2 98%   BMI 30.38 kg/m²

## 2021-09-27 ENCOUNTER — OFFICE VISIT (OUTPATIENT)
Dept: CARDIOLOGY CLINIC | Age: 86
End: 2021-09-27
Payer: MEDICARE

## 2021-09-27 VITALS
HEIGHT: 67 IN | DIASTOLIC BLOOD PRESSURE: 70 MMHG | WEIGHT: 195 LBS | SYSTOLIC BLOOD PRESSURE: 120 MMHG | BODY MASS INDEX: 30.61 KG/M2 | OXYGEN SATURATION: 100 % | HEART RATE: 67 BPM | RESPIRATION RATE: 16 BRPM

## 2021-09-27 DIAGNOSIS — I10 ESSENTIAL HYPERTENSION, BENIGN: ICD-10-CM

## 2021-09-27 DIAGNOSIS — I25.10 CORONARY ARTERY DISEASE INVOLVING NATIVE CORONARY ARTERY OF NATIVE HEART WITHOUT ANGINA PECTORIS: Primary | ICD-10-CM

## 2021-09-27 DIAGNOSIS — Z95.1 S/P CABG (CORONARY ARTERY BYPASS GRAFT): ICD-10-CM

## 2021-09-27 DIAGNOSIS — E78.00 HYPERCHOLESTEROLEMIA: ICD-10-CM

## 2021-09-27 DIAGNOSIS — Q24.8 LEFT VENTRICULAR OUTFLOW TRACT OBSTRUCTION: ICD-10-CM

## 2021-09-27 PROCEDURE — 93010 ELECTROCARDIOGRAM REPORT: CPT | Performed by: SPECIALIST

## 2021-09-27 PROCEDURE — G8510 SCR DEP NEG, NO PLAN REQD: HCPCS | Performed by: SPECIALIST

## 2021-09-27 PROCEDURE — 99214 OFFICE O/P EST MOD 30 MIN: CPT | Performed by: SPECIALIST

## 2021-09-27 PROCEDURE — G8417 CALC BMI ABV UP PARAM F/U: HCPCS | Performed by: SPECIALIST

## 2021-09-27 PROCEDURE — G8754 DIAS BP LESS 90: HCPCS | Performed by: SPECIALIST

## 2021-09-27 PROCEDURE — G8752 SYS BP LESS 140: HCPCS | Performed by: SPECIALIST

## 2021-09-27 PROCEDURE — 1101F PT FALLS ASSESS-DOCD LE1/YR: CPT | Performed by: SPECIALIST

## 2021-09-27 PROCEDURE — 93005 ELECTROCARDIOGRAM TRACING: CPT | Performed by: SPECIALIST

## 2021-09-27 PROCEDURE — G0463 HOSPITAL OUTPT CLINIC VISIT: HCPCS | Performed by: SPECIALIST

## 2021-09-27 PROCEDURE — G8536 NO DOC ELDER MAL SCRN: HCPCS | Performed by: SPECIALIST

## 2021-09-27 PROCEDURE — G8427 DOCREV CUR MEDS BY ELIG CLIN: HCPCS | Performed by: SPECIALIST

## 2021-09-27 NOTE — PATIENT INSTRUCTIONS
Please return to see Dr. Jimena Nolan in 1 year with an echocardiogram the same day. Please have lab work completed 1 week prior to your follow up.

## 2021-09-27 NOTE — PROGRESS NOTES
Anderson Cruz     1935       Reny Ellison MD, [facility] [place]  Date of Visit-9/27/2021   PCP is None   [facility] [place]  [facility]  HPI:  Anderson Cruz is a 81 y.o. male   6 month f/u of CAD, HTN, LVOT gradient. Today. .. Overall the pt states he is doing well. Active, denies any issues  Denies chest pain, edema, syncope or shortness of breath at rest, has no tachycardia, palpitations or sense of arrhythmia. EKG: Sinus  Rhythm  -First degree A-V block  Brian = 224-Left atrial enlargement. Possible left ventricular hypertrophy on non-voltage basis. -  Extensive T-abnormality  - Anterior/lateral and inferior changes. Assessment/Plan:     Patient Instructions   Please return to see Dr. Dionicio Ellison in 1 year with an echocardiogram the same day. Please have lab work completed 1 week prior to your follow up. 1. Coronary artery disease involving native coronary artery of native heart without angina pectoris  Pain-free. Continue BB and ASA. Prior CABG in 2000. Stress echo 2017 normal at 6 minutes. 2. Left ventricular outflow tract obstruction  Has hyperdynamic LV function and a dynamic obstructive outlet. Avoid dehydration or tachcyardia. Murmur unchanged and he has a mid-cavity obstruction. .    Echo next visit. EKG with T wave inverted    3. Essential hypertension, benign  Stable. Edema resolved. At goal , meds and possible side effects reviewed and patient denies  Key CAD CHF Meds             metoprolol succinate (TOPROL-XL) 50 mg XL tablet (Taking) Take 1 Tab by mouth daily. simvastatin (ZOCOR) 40 mg tablet (Taking) Take 1 Tab by mouth nightly. hydroCHLOROthiazide (HYDRODIURIL) 25 mg tablet (Taking) Take 1 Tab by mouth daily. aspirin delayed-release 81 mg tablet (Taking) Take  by mouth daily.          BP Readings from Last 6 Encounters:   09/27/21 120/70   03/29/21 120/80   09/14/20 130/60   03/13/20 136/64   09/13/19 120/80 07/19/19 140/90          4. Hypercholesterolemia  Lipids at goal.   At goal , denies excess muscle aches or new liver issues  Key Antihyperlipidemia Meds             simvastatin (ZOCOR) 40 mg tablet (Taking) Take 1 Tab by mouth nightly. Lab Results   Component Value Date/Time    LDL, calculated 81 09/15/2020 09:09 AM    LDL, calculated 71 09/10/2019 09:03 AM         Overall plan f/u in one year with labs and echo. Will try to help him get a COVID-19 vaccine. He does not see any other doctors. Orders Placed This Encounter    LIPID PANEL    CBC WITH AUTOMATED DIFF    METABOLIC PANEL, COMPREHENSIVE    AMB POC EKG ROUTINE W/ 12 LEADS, INTER & REP        5. S/P CABG (coronary artery bypass graft)-2000     Impression:   1. 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 Left ventricular outflow tract obstruction    3. Essential hypertension, benign    4. Hypercholesterolemia    5. S/P CABG (coronary artery bypass graft)       Cardiac History:   PHI: [facility]  heart cath in 2003 showed EF 50-60% with bypass done in 2000 and echocardiogram done in 2001 showing LVH, normal wall motion and no significant valve disease. ECHO 3-= Left ventricle:hyperdynamic. LVEF- 80 %. mid cavity outflow tract obstruction:; rest gradient was 20 mmHg max and valsalva gradient ranged from 80-90 mmHg max; Mild TR  Comparison was made with the previous study of 01-Oct-2001. At that time  turbulence in the LVOT was noted but not able to discern in detail, this  study confirms significant dynamic obstructive outflow physiology     No future appointments. ROS-except as noted above. . A complete cardiac and respiratory are reviewed and negative except as above ; Resp-denies wheezing  or productive cough,.  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 He has never used smokeless tobacco. He reports that he does not drink alcohol and does not use drugs. Exam and Labs:  /70   Pulse 67   Resp 16   Ht 5' 7\" (1.702 m)   Wt 195 lb (88.5 kg)   SpO2 100%   BMI 30.54 kg/m² Constitutional:  NAD, comfortable  Head: NC,AT. Eyes: No scleral icterus. Neck:  Neck supple. No JVD present. Throat: moist mucous membranes. Chest: Effort normal & normal respiratory excursion . Neurological: alert, conversant and oriented . Skin: Skin is not cold. No obvious systemic rash noted. Not diaphoretic. No erythema. Psychiatric:  Grossly normal mood and affect. Behavior appears normal. Extremities:  no clubbing or cyanosis. Abdomen: non distended    Lungs:breath sounds normal. No stridor. distress, wheezes or  Rales. Heart: 2/6 very short systolic murmur normal rate, regular rhythm, normal S1, S2, no rubs, clicks or gallops , PMI non displaced. Edema: Edema is none.   Lab Results   Component Value Date/Time    Cholesterol, total 157 09/15/2020 09:09 AM    HDL Cholesterol 56 09/15/2020 09:09 AM    LDL, calculated 81 09/15/2020 09:09 AM    LDL, calculated 71 09/10/2019 09:03 AM    Triglyceride 114 09/15/2020 09:09 AM    CHOL/HDL Ratio 2.7 03/30/2010 01:42 PM     Lab Results   Component Value Date/Time    Sodium 140 03/29/2021 12:13 PM    Potassium 4.8 03/29/2021 12:13 PM    Chloride 105 03/29/2021 12:13 PM    CO2 30 03/29/2021 12:13 PM    Anion gap 5 03/29/2021 12:13 PM    Glucose 76 03/29/2021 12:13 PM    BUN 13 03/29/2021 12:13 PM    Creatinine 1.13 03/29/2021 12:13 PM    BUN/Creatinine ratio 12 03/29/2021 12:13 PM    GFR est AA >60 03/29/2021 12:13 PM    GFR est non-AA >60 03/29/2021 12:13 PM    Calcium 8.6 03/29/2021 12:13 PM      Wt Readings from Last 3 Encounters:   09/27/21 195 lb (88.5 kg)   03/29/21 194 lb (88 kg)   09/14/20 196 lb 3.2 oz (89 kg)      BP Readings from Last 3 Encounters:   09/27/21 120/70   03/29/21 120/80   09/14/20 130/60      Current Outpatient Medications   Medication Sig    metoprolol succinate (TOPROL-XL) 50 mg XL tablet Take 1 Tab by mouth daily.  simvastatin (ZOCOR) 40 mg tablet Take 1 Tab by mouth nightly.  hydroCHLOROthiazide (HYDRODIURIL) 25 mg tablet Take 1 Tab by mouth daily.  aspirin delayed-release 81 mg tablet Take  by mouth daily. No current facility-administered medications for this visit. Impression see above.       Written by Janeth Naidu, as dictated by Malena Ruffin MD.

## 2021-09-30 DIAGNOSIS — E78.00 HYPERCHOLESTEROLEMIA: ICD-10-CM

## 2021-10-01 RX ORDER — SIMVASTATIN 40 MG/1
40 TABLET, FILM COATED ORAL
Qty: 90 TABLET | Refills: 3 | Status: SHIPPED | OUTPATIENT
Start: 2021-10-01 | End: 2022-10-10

## 2021-10-01 NOTE — TELEPHONE ENCOUNTER
Per VO by MD.     Future Appointments   Date Time Provider Tha Fields   9/26/2022  9:00 AM ANUP OWENS BS AMB   9/26/2022 10:00 AM Reny Wade MD [facility]

## 2021-10-20 ENCOUNTER — TELEPHONE (OUTPATIENT)
Dept: CARDIOLOGY CLINIC | Age: 86
End: 2021-10-20

## 2021-10-20 NOTE — TELEPHONE ENCOUNTER
Verified patient with two types of identifiers. Spoke to patient's daughter who expressed frustration with our office scheduling her father for a COVID vaccine. She states he already had the vaccine in the spring. Let her know that the patient told us he was unvaccinated and wanted the COVID vaccine therefore we assisted him. Informed her to avoid future miscommunications she should accompany her father to his doctor's appointments. Also informed her that a booster will be beneficial to her father and this should not be a concern. Confirmed his follow up appointments. Patient's daughter verbalized understanding and will call with any other questions.

## 2022-01-07 DIAGNOSIS — I25.10 CORONARY ARTERY DISEASE INVOLVING NATIVE CORONARY ARTERY OF NATIVE HEART WITHOUT ANGINA PECTORIS: ICD-10-CM

## 2022-01-07 DIAGNOSIS — I10 ESSENTIAL HYPERTENSION, BENIGN: ICD-10-CM

## 2022-01-10 RX ORDER — METOPROLOL SUCCINATE 50 MG/1
50 TABLET, EXTENDED RELEASE ORAL DAILY
Qty: 90 TABLET | Refills: 3 | Status: SHIPPED | OUTPATIENT
Start: 2022-01-10

## 2022-10-05 DIAGNOSIS — I25.10 CORONARY ARTERY DISEASE INVOLVING NATIVE CORONARY ARTERY OF NATIVE HEART WITHOUT ANGINA PECTORIS: Primary | ICD-10-CM

## 2022-10-05 DIAGNOSIS — E78.00 HYPERCHOLESTEROLEMIA: ICD-10-CM

## 2022-10-10 RX ORDER — SIMVASTATIN 40 MG/1
TABLET, FILM COATED ORAL
Qty: 90 TABLET | Refills: 3 | Status: SHIPPED | OUTPATIENT
Start: 2022-10-10

## 2022-10-10 NOTE — TELEPHONE ENCOUNTER
Per VO by MD. Left message with pharmacy to remind Mr. Vickers Many to call the office to schedule overdue follow up. No future appointments.

## 2022-12-15 ENCOUNTER — OFFICE VISIT (OUTPATIENT)
Dept: CARDIOLOGY CLINIC | Age: 87
End: 2022-12-15

## 2022-12-15 ENCOUNTER — ANCILLARY PROCEDURE (OUTPATIENT)
Dept: CARDIOLOGY CLINIC | Age: 87
End: 2022-12-15
Payer: MEDICARE

## 2022-12-15 VITALS
DIASTOLIC BLOOD PRESSURE: 70 MMHG | BODY MASS INDEX: 31.71 KG/M2 | RESPIRATION RATE: 16 BRPM | HEART RATE: 74 BPM | HEIGHT: 67 IN | OXYGEN SATURATION: 98 % | WEIGHT: 202 LBS | SYSTOLIC BLOOD PRESSURE: 140 MMHG

## 2022-12-15 VITALS
BODY MASS INDEX: 31.71 KG/M2 | SYSTOLIC BLOOD PRESSURE: 120 MMHG | HEIGHT: 67 IN | WEIGHT: 202 LBS | DIASTOLIC BLOOD PRESSURE: 70 MMHG

## 2022-12-15 DIAGNOSIS — M25.471 BILATERAL SWELLING OF FEET AND ANKLES: ICD-10-CM

## 2022-12-15 DIAGNOSIS — Q24.8 LEFT VENTRICULAR OUTFLOW TRACT OBSTRUCTION: ICD-10-CM

## 2022-12-15 DIAGNOSIS — E78.00 HYPERCHOLESTEROLEMIA: ICD-10-CM

## 2022-12-15 DIAGNOSIS — Z95.1 S/P CABG (CORONARY ARTERY BYPASS GRAFT): ICD-10-CM

## 2022-12-15 DIAGNOSIS — M25.472 BILATERAL SWELLING OF FEET AND ANKLES: ICD-10-CM

## 2022-12-15 DIAGNOSIS — I25.10 CORONARY ARTERY DISEASE INVOLVING NATIVE CORONARY ARTERY OF NATIVE HEART WITHOUT ANGINA PECTORIS: ICD-10-CM

## 2022-12-15 DIAGNOSIS — I10 ESSENTIAL HYPERTENSION, BENIGN: ICD-10-CM

## 2022-12-15 DIAGNOSIS — E78.00 PURE HYPERCHOLESTEROLEMIA: ICD-10-CM

## 2022-12-15 DIAGNOSIS — M25.474 BILATERAL SWELLING OF FEET AND ANKLES: ICD-10-CM

## 2022-12-15 DIAGNOSIS — M25.475 BILATERAL SWELLING OF FEET AND ANKLES: ICD-10-CM

## 2022-12-15 DIAGNOSIS — I25.10 CORONARY ARTERY DISEASE INVOLVING NATIVE CORONARY ARTERY OF NATIVE HEART WITHOUT ANGINA PECTORIS: Primary | ICD-10-CM

## 2022-12-15 PROCEDURE — 93306 TTE W/DOPPLER COMPLETE: CPT | Performed by: SPECIALIST

## 2022-12-15 RX ORDER — HYDROCHLOROTHIAZIDE 25 MG/1
25 TABLET ORAL DAILY
Qty: 90 TABLET | Refills: 3 | Status: SHIPPED | OUTPATIENT
Start: 2022-12-15

## 2022-12-15 NOTE — PROGRESS NOTES
Perri Morin     1935       Reny Rasmussen MD, [facility] [place]  Date of Visit-12/15/2022   PCP is None   [facility] [place]  [facility]  HPI:  Perri Morin is a 81 y.o. male   14  month f/u of   CAD, HTN, LVOT gradient. Echo today prelim EF normal , mild MR, TR, PI      Today. .. Denies chest pain, edema, syncope or shortness of breath at rest   Has no tachycardia , palpitations or sense of arrythmia    No complaints  Active for age, sees no other docs, does not want to  Assessment/Plan:     Patient Instructions   Labs today. We will see you back for an annual follow up. Orders Placed This Encounter    METABOLIC PANEL, COMPREHENSIVE    CBC W/O DIFF    LIPID PANEL    TSH 3RD GENERATION    AMB POC EKG ROUTINE W/ 12 LEADS, INTER & REP    hydroCHLOROthiazide (HYDRODIURIL) 25 mg tablet      1. Coronary artery disease involving native coronary artery of native heart without angina pectoris  Pain-free. Continue BB and ASA. Prior CABG in 2000. Stress echo 2017 normal at 6 minutes. 2. Left ventricular outflow tract obstruction  Has hyperdynamic LV function and a dynamic obstructive outlet. Avoid dehydration or tachcyardia. Murmur unchanged and he has a mid-cavity obstruction. .    EKG with T wave inverted previously     3. Essential hypertension, benign  Stable. Edema resolved. At goal , meds and possible side effects reviewed and patient denies  Key CAD CHF Meds               hydroCHLOROthiazide (HYDRODIURIL) 25 mg tablet (Taking) Take 1 Tablet by mouth daily. simvastatin (ZOCOR) 40 mg tablet (Taking) TAKE 1 TABLET BY MOUTH EVERY DAY AT NIGHT    metoprolol succinate (TOPROL-XL) 50 mg XL tablet (Taking) Take 1 Tablet by mouth daily. aspirin delayed-release 81 mg tablet (Taking) Take  by mouth daily.            BP Readings from Last 6 Encounters:   12/15/22 (!) 140/70   12/15/22 120/70   09/27/21 120/70   03/29/21 120/80   09/14/20 130/60   03/13/20 136/64          4. Hypercholesterolemia  Lipids at goal.   At goal , denies excess muscle aches or new liver issues  Key Antihyperlipidemia Meds               simvastatin (ZOCOR) 40 mg tablet (Taking) TAKE 1 TABLET BY MOUTH EVERY DAY AT NIGHT           Lab Results   Component Value Date/Time    LDL, calculated 81 09/15/2020 09:09 AM    LDL, calculated 71 09/10/2019 09:03 AM       5. S/P CABG (coronary artery bypass graft)-2000     Impression:   1. 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 Left ventricular outflow tract obstruction    3. Essential hypertension, benign    4. Hypercholesterolemia    5. S/P CABG (coronary artery bypass graft)    6. Bilateral swelling of feet and ankles    7. Hypercholesterolemia       Cardiac History:   PH[facility]: [facility]  heart cath in 2003 showed EF 50-60% with bypass done in 2000 and echocardiogram done in 2001 showing LVH, normal wall motion and no significant valve disease. ECHO 3-= Left ventricle:hyperdynamic. LVEF- 80 %. mid cavity outflow tract obstruction:; rest gradient was 20 mmHg max and valsalva gradient ranged from 80-90 mmHg max; Mild TR  Comparison was made with the previous study of 01-Oct-2001. At that time  turbulence in the LVOT was noted but not able to discern in detail, this  study confirms significant dynamic obstructive outflow physiology     Future Appointments   Date Time Provider Tha Fields   12/14/2023  1:40 PM Reny Wade MD [facility]        ROS-except as noted above. . A complete cardiac and respiratory are reviewed and negative except as above ; Resp-denies wheezing  or productive cough,.  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 He has never used smokeless tobacco. He reports that he does not drink alcohol and does not use drugs. Exam and Labs:  BP (!) 140/70   Pulse 74   Resp 16   Ht 5' 7\" (1.702 m)   Wt 202 lb (91.6 kg)   SpO2 98%   BMI 31.64 kg/m² Constitutional:  NAD, comfortable  Head: NC,AT. Eyes: No scleral icterus. Neck:  Neck supple. No JVD present. Throat: moist mucous membranes. Chest: Effort normal & normal respiratory excursion . Neurological: alert, conversant and oriented . Skin: Skin is not cold. No obvious systemic rash noted. Not diaphoretic. No erythema. Psychiatric:  Grossly normal mood and affect. Behavior appears normal. Extremities:  no clubbing or cyanosis. Abdomen: non distended    Lungs:breath sounds normal. No stridor. distress, wheezes or  Rales. Heart: 2/6 very short systolic murmur normal rate, regular rhythm, normal S1, S2, no rubs, clicks or gallops , PMI non displaced. Edema: Edema is none.   Lab Results   Component Value Date/Time    Cholesterol, total 157 09/15/2020 09:09 AM    HDL Cholesterol 56 09/15/2020 09:09 AM    LDL, calculated 81 09/15/2020 09:09 AM    LDL, calculated 71 09/10/2019 09:03 AM    Triglyceride 114 09/15/2020 09:09 AM    CHOL/HDL Ratio 2.7 03/30/2010 01:42 PM     Lab Results   Component Value Date/Time    Sodium 140 03/29/2021 12:13 PM    Potassium 4.8 03/29/2021 12:13 PM    Chloride 105 03/29/2021 12:13 PM    CO2 30 03/29/2021 12:13 PM    Anion gap 5 03/29/2021 12:13 PM    Glucose 76 03/29/2021 12:13 PM    BUN 13 03/29/2021 12:13 PM    Creatinine 1.13 03/29/2021 12:13 PM    BUN/Creatinine ratio 12 03/29/2021 12:13 PM    GFR est AA >60 03/29/2021 12:13 PM    GFR est non-AA >60 03/29/2021 12:13 PM    Calcium 8.6 03/29/2021 12:13 PM      Wt Readings from Last 3 Encounters:   12/15/22 202 lb (91.6 kg)   12/15/22 202 lb (91.6 kg)   09/27/21 195 lb (88.5 kg)      BP Readings from Last 3 Encounters:   12/15/22 (!) 140/70   12/15/22 120/70   09/27/21 120/70      Current Outpatient Medications   Medication Sig    hydroCHLOROthiazide (HYDRODIURIL) 25 mg tablet Take 1 Tablet by mouth daily. simvastatin (ZOCOR) 40 mg tablet TAKE 1 TABLET BY MOUTH EVERY DAY AT NIGHT    metoprolol succinate (TOPROL-XL) 50 mg XL tablet Take 1 Tablet by mouth daily. aspirin delayed-release 81 mg tablet Take  by mouth daily. No current facility-administered medications for this visit. Impression see above. This note was created using voice recognition software. Despite editing, there may be syntax errors.

## 2022-12-16 LAB
ALBUMIN SERPL-MCNC: 3.8 G/DL (ref 3.5–5)
ALBUMIN/GLOB SERPL: 1.2 {RATIO} (ref 1.1–2.2)
ALP SERPL-CCNC: 150 U/L (ref 45–117)
ALT SERPL-CCNC: 15 U/L (ref 12–78)
ANION GAP SERPL CALC-SCNC: 3 MMOL/L (ref 5–15)
AST SERPL-CCNC: 18 U/L (ref 15–37)
BILIRUB SERPL-MCNC: 0.5 MG/DL (ref 0.2–1)
BUN SERPL-MCNC: 13 MG/DL (ref 6–20)
BUN/CREAT SERPL: 9 (ref 12–20)
CALCIUM SERPL-MCNC: 9.3 MG/DL (ref 8.5–10.1)
CHLORIDE SERPL-SCNC: 108 MMOL/L (ref 97–108)
CHOLEST SERPL-MCNC: 150 MG/DL
CO2 SERPL-SCNC: 29 MMOL/L (ref 21–32)
COMMENT, HOLDF: NORMAL
CREAT SERPL-MCNC: 1.43 MG/DL (ref 0.7–1.3)
ERYTHROCYTE [DISTWIDTH] IN BLOOD BY AUTOMATED COUNT: 13.6 % (ref 11.5–14.5)
GLOBULIN SER CALC-MCNC: 3.1 G/DL (ref 2–4)
GLUCOSE SERPL-MCNC: 84 MG/DL (ref 65–100)
HCT VFR BLD AUTO: 50.1 % (ref 36.6–50.3)
HDLC SERPL-MCNC: 49 MG/DL
HDLC SERPL: 3.1 {RATIO} (ref 0–5)
HGB BLD-MCNC: 15.8 G/DL (ref 12.1–17)
LDLC SERPL CALC-MCNC: 73.6 MG/DL (ref 0–100)
MCH RBC QN AUTO: 30.2 PG (ref 26–34)
MCHC RBC AUTO-ENTMCNC: 31.5 G/DL (ref 30–36.5)
MCV RBC AUTO: 95.8 FL (ref 80–99)
NRBC # BLD: 0 K/UL (ref 0–0.01)
NRBC BLD-RTO: 0 PER 100 WBC
PLATELET # BLD AUTO: 168 K/UL (ref 150–400)
PMV BLD AUTO: 11.7 FL (ref 8.9–12.9)
POTASSIUM SERPL-SCNC: 4.7 MMOL/L (ref 3.5–5.1)
PROT SERPL-MCNC: 6.9 G/DL (ref 6.4–8.2)
RBC # BLD AUTO: 5.23 M/UL (ref 4.1–5.7)
SAMPLES BEING HELD,HOLD: NORMAL
SODIUM SERPL-SCNC: 140 MMOL/L (ref 136–145)
TRIGL SERPL-MCNC: 137 MG/DL (ref ?–150)
TSH SERPL DL<=0.05 MIU/L-ACNC: 1.58 UIU/ML (ref 0.36–3.74)
VLDLC SERPL CALC-MCNC: 27.4 MG/DL
WBC # BLD AUTO: 7.5 K/UL (ref 4.1–11.1)

## 2022-12-18 LAB
ECHO AO ROOT DIAM: 3.4 CM
ECHO AO ROOT INDEX: 1.67 CM/M2
ECHO AV PEAK GRADIENT: 8 MMHG
ECHO AV PEAK VELOCITY: 1.4 M/S
ECHO AV VELOCITY RATIO: 0.93
ECHO EST RA PRESSURE: 3 MMHG
ECHO LA DIAMETER INDEX: 2.07 CM/M2
ECHO LA DIAMETER: 4.2 CM
ECHO LA TO AORTIC ROOT RATIO: 1.24
ECHO LA VOL 2C: 38 ML (ref 18–58)
ECHO LA VOL 4C: 44 ML (ref 18–58)
ECHO LA VOL BP: 45 ML (ref 18–58)
ECHO LA VOL/BSA BIPLANE: 22 ML/M2 (ref 16–34)
ECHO LA VOLUME AREA LENGTH: 46 ML
ECHO LA VOLUME INDEX A2C: 19 ML/M2 (ref 16–34)
ECHO LA VOLUME INDEX A4C: 22 ML/M2 (ref 16–34)
ECHO LA VOLUME INDEX AREA LENGTH: 23 ML/M2 (ref 16–34)
ECHO LV E' LATERAL VELOCITY: 6 CM/S
ECHO LV E' SEPTAL VELOCITY: 3 CM/S
ECHO LV EDV A2C: 42 ML
ECHO LV EDV A4C: 43 ML
ECHO LV EDV BP: 44 ML (ref 67–155)
ECHO LV EDV INDEX A4C: 21 ML/M2
ECHO LV EDV INDEX BP: 22 ML/M2
ECHO LV EDV NDEX A2C: 21 ML/M2
ECHO LV EJECTION FRACTION A2C: 69 %
ECHO LV EJECTION FRACTION A4C: 61 %
ECHO LV EJECTION FRACTION BIPLANE: 66 % (ref 55–100)
ECHO LV ESV A2C: 13 ML
ECHO LV ESV A4C: 17 ML
ECHO LV ESV BP: 15 ML (ref 22–58)
ECHO LV ESV INDEX A2C: 6 ML/M2
ECHO LV ESV INDEX A4C: 8 ML/M2
ECHO LV ESV INDEX BP: 7 ML/M2
ECHO LV FRACTIONAL SHORTENING: 66 % (ref 28–44)
ECHO LV INTERNAL DIMENSION DIASTOLE INDEX: 1.72 CM/M2
ECHO LV INTERNAL DIMENSION DIASTOLIC: 3.5 CM (ref 4.2–5.9)
ECHO LV INTERNAL DIMENSION SYSTOLIC INDEX: 0.59 CM/M2
ECHO LV INTERNAL DIMENSION SYSTOLIC: 1.2 CM
ECHO LV IVSD: 1.8 CM (ref 0.6–1)
ECHO LV MASS 2D: 250.3 G (ref 88–224)
ECHO LV MASS INDEX 2D: 123.3 G/M2 (ref 49–115)
ECHO LV POSTERIOR WALL DIASTOLIC: 1.7 CM (ref 0.6–1)
ECHO LV RELATIVE WALL THICKNESS RATIO: 0.97
ECHO LVOT PEAK GRADIENT: 7 MMHG
ECHO LVOT PEAK VELOCITY: 1.3 M/S
ECHO MV A VELOCITY: 0.82 M/S
ECHO MV E DECELERATION TIME (DT): 234.6 MS
ECHO MV E VELOCITY: 0.46 M/S
ECHO MV E/A RATIO: 0.56
ECHO MV E/E' LATERAL: 7.67
ECHO MV E/E' RATIO (AVERAGED): 11.5
ECHO MV E/E' SEPTAL: 15.33
ECHO RA AREA 4C: 12.4 CM2
ECHO RA END SYSTOLIC VOLUME APICAL 4 CHAMBER INDEX BSA: 13 ML/M2
ECHO RA VOLUME AREA LENGTH APICAL 4 CHAMBER: 28 ML
ECHO RA VOLUME: 26 ML
ECHO RIGHT VENTRICULAR SYSTOLIC PRESSURE (RVSP): 25 MMHG
ECHO RV BASAL DIMENSION: 3.5 CM
ECHO TV REGURGITANT MAX VELOCITY: 2.36 M/S
ECHO TV REGURGITANT PEAK GRADIENT: 22 MMHG

## 2022-12-18 PROCEDURE — 93306 TTE W/DOPPLER COMPLETE: CPT | Performed by: SPECIALIST

## 2022-12-18 NOTE — PROGRESS NOTES
Labs reviewed  TSH, Lipids and CBC fine  Creatinine up a bit, cut back on HCTZ to 12.5 mg daily  Recheck in one month, thanks  Future Appointments  12/14/2023 1:40 PM    Reny Wade MD [facility] AMB

## 2023-01-05 DIAGNOSIS — I10 ESSENTIAL HYPERTENSION, BENIGN: ICD-10-CM

## 2023-01-05 DIAGNOSIS — I25.10 CORONARY ARTERY DISEASE INVOLVING NATIVE CORONARY ARTERY OF NATIVE HEART WITHOUT ANGINA PECTORIS: ICD-10-CM

## 2023-01-05 RX ORDER — METOPROLOL SUCCINATE 50 MG/1
TABLET, EXTENDED RELEASE ORAL
Qty: 90 TABLET | Refills: 3 | Status: SHIPPED | OUTPATIENT
Start: 2023-01-05

## 2023-01-05 NOTE — TELEPHONE ENCOUNTER
Requested Prescriptions     Signed Prescriptions Disp Refills    metoprolol succinate (TOPROL-XL) 50 mg XL tablet 90 Tablet 3     Sig: TAKE 1 TABLET BY MOUTH EVERY DAY     Authorizing Provider: Steve Suarez     Ordering User: Duane Back     Verbal order per Dr. Alex Hodges.      Future Appointments   Date Time Provider Tha Fields   12/14/2023  1:40 PM MD ARON Gracia AMB

## 2023-02-28 ENCOUNTER — HOSPITAL ENCOUNTER (EMERGENCY)
Age: 88
Discharge: HOME | End: 2023-02-28
Attending: STUDENT IN AN ORGANIZED HEALTH CARE EDUCATION/TRAINING PROGRAM
Payer: MEDICARE

## 2023-02-28 ENCOUNTER — APPOINTMENT (OUTPATIENT)
Dept: CT IMAGING | Age: 88
End: 2023-02-28
Attending: STUDENT IN AN ORGANIZED HEALTH CARE EDUCATION/TRAINING PROGRAM
Payer: MEDICARE

## 2023-02-28 VITALS
SYSTOLIC BLOOD PRESSURE: 123 MMHG | RESPIRATION RATE: 18 BRPM | HEART RATE: 64 BPM | OXYGEN SATURATION: 90 % | TEMPERATURE: 97.7 F | DIASTOLIC BLOOD PRESSURE: 61 MMHG

## 2023-02-28 DIAGNOSIS — N18.9 CHRONIC KIDNEY DISEASE, UNSPECIFIED CKD STAGE: Primary | ICD-10-CM

## 2023-02-28 DIAGNOSIS — R53.1 WEAKNESS: ICD-10-CM

## 2023-02-28 LAB
ALBUMIN SERPL-MCNC: 3.6 G/DL (ref 3.5–5)
ALBUMIN/GLOB SERPL: 1 (ref 1.1–2.2)
ALP SERPL-CCNC: 153 U/L (ref 45–117)
ALT SERPL-CCNC: 14 U/L (ref 12–78)
ANION GAP SERPL CALC-SCNC: 6 MMOL/L (ref 5–15)
AST SERPL-CCNC: 16 U/L (ref 15–37)
BASOPHILS # BLD: 0.1 K/UL (ref 0–0.1)
BASOPHILS NFR BLD: 1 % (ref 0–1)
BILIRUB SERPL-MCNC: 0.5 MG/DL (ref 0.2–1)
BUN SERPL-MCNC: 29 MG/DL (ref 6–20)
BUN/CREAT SERPL: 15 (ref 12–20)
CALCIUM SERPL-MCNC: 9.2 MG/DL (ref 8.5–10.1)
CHLORIDE SERPL-SCNC: 108 MMOL/L (ref 97–108)
CO2 SERPL-SCNC: 26 MMOL/L (ref 21–32)
CREAT SERPL-MCNC: 1.95 MG/DL (ref 0.7–1.3)
DIFFERENTIAL METHOD BLD: NORMAL
EOSINOPHIL # BLD: 0.2 K/UL (ref 0–0.4)
EOSINOPHIL NFR BLD: 3 % (ref 0–7)
ERYTHROCYTE [DISTWIDTH] IN BLOOD BY AUTOMATED COUNT: 13.5 % (ref 11.5–14.5)
GLOBULIN SER CALC-MCNC: 3.7 G/DL (ref 2–4)
GLUCOSE SERPL-MCNC: 93 MG/DL (ref 65–100)
HCT VFR BLD AUTO: 48 % (ref 36.6–50.3)
HGB BLD-MCNC: 15.3 G/DL (ref 12.1–17)
IMM GRANULOCYTES # BLD AUTO: 0 K/UL (ref 0–0.04)
IMM GRANULOCYTES NFR BLD AUTO: 0 % (ref 0–0.5)
LYMPHOCYTES # BLD: 2.3 K/UL (ref 0.8–3.5)
LYMPHOCYTES NFR BLD: 35 % (ref 12–49)
MAGNESIUM SERPL-MCNC: 2 MG/DL (ref 1.6–2.4)
MCH RBC QN AUTO: 29.5 PG (ref 26–34)
MCHC RBC AUTO-ENTMCNC: 31.9 G/DL (ref 30–36.5)
MCV RBC AUTO: 92.7 FL (ref 80–99)
MONOCYTES # BLD: 0.8 K/UL (ref 0–1)
MONOCYTES NFR BLD: 12 % (ref 5–13)
NEUTS SEG # BLD: 3.3 K/UL (ref 1.8–8)
NEUTS SEG NFR BLD: 49 % (ref 32–75)
NRBC # BLD: 0 K/UL (ref 0–0.01)
NRBC BLD-RTO: 0 PER 100 WBC
PLATELET # BLD AUTO: 158 K/UL (ref 150–400)
PMV BLD AUTO: 10.3 FL (ref 8.9–12.9)
POTASSIUM SERPL-SCNC: 4.4 MMOL/L (ref 3.5–5.1)
PROT SERPL-MCNC: 7.3 G/DL (ref 6.4–8.2)
RBC # BLD AUTO: 5.18 M/UL (ref 4.1–5.7)
SODIUM SERPL-SCNC: 140 MMOL/L (ref 136–145)
WBC # BLD AUTO: 6.7 K/UL (ref 4.1–11.1)

## 2023-02-28 PROCEDURE — 80053 COMPREHEN METABOLIC PANEL: CPT

## 2023-02-28 PROCEDURE — 85025 COMPLETE CBC W/AUTO DIFF WBC: CPT

## 2023-02-28 PROCEDURE — 83735 ASSAY OF MAGNESIUM: CPT

## 2023-02-28 PROCEDURE — 99284 EMERGENCY DEPT VISIT MOD MDM: CPT

## 2023-02-28 PROCEDURE — 70450 CT HEAD/BRAIN W/O DYE: CPT

## 2023-02-28 PROCEDURE — 36415 COLL VENOUS BLD VENIPUNCTURE: CPT

## 2023-02-28 NOTE — ED PROVIDER NOTES
HPI     Date of Service:  2/28/2023    Patient:  Dariela Lorenzana    Chief Complaint:  Extremity Weakness       HPI:  Dariela Lorenzana is a 81 y.o.  male with a past medical history of HTN, HLD, CAD s/p CABG who presents for evaluation of leg weakness. Patient reports he was walking to the mailbox this afternoon when he had an episode of his left leg \"giving out. \"  Patient reports episode was brief, subsequently caught himself with the mailbox and did not fall to the ground. He did not feel like his leg was weak or had any paresthesias. No other extremity weakness, no confusion, vision changes, difficulty speaking or ambulating. No other recent illness. He denies any pain complaints. 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 HX CORONARY ARTERY BYPASS GRAFT  2000    HX HEART CATHETERIZATION  20003 vessel coronary artery disease. The ejection fraction was 50-60%         No family history on file.     Social History     Socioeconomic History    Marital status:      Spouse name: Not on file    Number of children: Not on file    Years of education: Not on file    Highest education level: Not on file   Occupational History    Not on file   Tobacco Use    Smoking status: Never    Smokeless tobacco: Never   Substance and Sexual Activity    Alcohol use: No    Drug use: No    Sexual activity: Not on file   Other Topics Concern    Not on file   Social History Narrative    Not on file     Social Determinants of Health     Financial Resource Strain: Not on file   Food Insecurity: Not on file   Transportation Needs: Not on file Physical Activity: Not on file   Stress: Not on file   Social Connections: Not on file   Intimate Partner Violence: Not on file   Housing Stability: Not on file         ALLERGIES: Patient has no known allergies. Review of Systems   Constitutional:  Negative for chills and fever. HENT:  Negative for congestion and rhinorrhea. Eyes:  Negative for discharge and redness. Respiratory:  Negative for cough and shortness of breath. Cardiovascular:  Negative for chest pain. Gastrointestinal:  Negative for abdominal pain, diarrhea, nausea and vomiting. Neurological:  Positive for weakness. Negative for speech difficulty and headaches. Psychiatric/Behavioral:  Negative for agitation and confusion. Vitals:    02/28/23 1710   BP: 138/64   Pulse: 65   Resp: 18   Temp: 97.6 °F (36.4 °C)   SpO2: 96%            Physical Exam  Vitals and nursing note reviewed. Constitutional:       General: He is not in acute distress. Appearance: Normal appearance. He is not ill-appearing or toxic-appearing. HENT:      Head: Normocephalic. Eyes:      General: No scleral icterus. Right eye: No discharge. Left eye: No discharge. Conjunctiva/sclera: Conjunctivae normal.   Cardiovascular:      Rate and Rhythm: Normal rate. Pulses: Normal pulses. Pulmonary:      Effort: Pulmonary effort is normal. No respiratory distress. Abdominal:      General: Abdomen is flat. Palpations: Abdomen is soft. Musculoskeletal:         General: Normal range of motion. Skin:     General: Skin is warm and dry. Capillary Refill: Capillary refill takes less than 2 seconds. Neurological:      General: No focal deficit present. Mental Status: He is alert and oriented to person, place, and time. Cranial Nerves: No cranial nerve deficit. Sensory: No sensory deficit. Motor: No weakness.       Gait: Gait normal.   Psychiatric:         Mood and Affect: Mood normal.         Behavior: Behavior normal.        Medical Decision Making      DECISION MAKING:  Bairon Paz is a 81 y.o. male who comes in as above. On arrival, patient is afebrile and vital signs are stable. On my examination patient is well-appearing. He is neurologically intact, normal strength in the upper and lower extremities. Symptoms sound likely musculoskeletal in nature but given his age and risk factors will evaluate with CT imaging of the head and basic laboratory work. CT imaging of the head is unremarkable. CBC is without leukocytosis or anemia. Electrolytes are stable. BUN and creatinine elevated at 29 and 1.95, creatinine is 1.5 at baseline. CT imaging of the head is unremarkable. Patient has remained asymptomatic while in the emergency department. I discussed results with patient and daughter. He does not have a primary care doctor, he will be referred to outpatient primary care clinic for outpatient follow-up. He will also be referred to nephrology for his elevated kidney function. Amount and/or Complexity of Data Reviewed  Labs: ordered. Decision-making details documented in ED Course. Radiology: ordered. Decision-making details documented in ED Course. Procedures      LABS:  Recent Results (from the past 6 hour(s))   CBC WITH AUTOMATED DIFF    Collection Time: 02/28/23  5:47 PM   Result Value Ref Range    WBC 6.7 4.1 - 11.1 K/uL    RBC 5.18 4.10 - 5.70 M/uL    HGB 15.3 12.1 - 17.0 g/dL    HCT 48.0 36.6 - 50.3 %    MCV 92.7 80.0 - 99.0 FL    MCH 29.5 26.0 - 34.0 PG    MCHC 31.9 30.0 - 36.5 g/dL    RDW 13.5 11.5 - 14.5 %    PLATELET 190 171 - 396 K/uL    MPV 10.3 8.9 - 12.9 FL    NRBC 0.0 0  WBC    ABSOLUTE NRBC 0.00 0.00 - 0.01 K/uL    NEUTROPHILS 49 32 - 75 %    LYMPHOCYTES 35 12 - 49 %    MONOCYTES 12 5 - 13 %    EOSINOPHILS 3 0 - 7 %    BASOPHILS 1 0 - 1 %    IMMATURE GRANULOCYTES 0 0.0 - 0.5 %    ABS. NEUTROPHILS 3.3 1.8 - 8.0 K/UL    ABS. LYMPHOCYTES 2.3 0.8 - 3.5 K/UL    ABS. MONOCYTES 0.8 0.0 - 1.0 K/UL    ABS. EOSINOPHILS 0.2 0.0 - 0.4 K/UL    ABS. BASOPHILS 0.1 0.0 - 0.1 K/UL    ABS. IMM. GRANS. 0.0 0.00 - 0.04 K/UL    DF AUTOMATED     METABOLIC PANEL, COMPREHENSIVE    Collection Time: 02/28/23  5:47 PM   Result Value Ref Range    Sodium 140 136 - 145 mmol/L    Potassium 4.4 3.5 - 5.1 mmol/L    Chloride 108 97 - 108 mmol/L    CO2 26 21 - 32 mmol/L    Anion gap 6 5 - 15 mmol/L    Glucose 93 65 - 100 mg/dL    BUN 29 (H) 6 - 20 MG/DL    Creatinine 1.95 (H) 0.70 - 1.30 MG/DL    BUN/Creatinine ratio 15 12 - 20      eGFR 33 (L) >60 ml/min/1.73m2    Calcium 9.2 8.5 - 10.1 MG/DL    Bilirubin, total 0.5 0.2 - 1.0 MG/DL    ALT (SGPT) 14 12 - 78 U/L    AST (SGOT) 16 15 - 37 U/L    Alk. phosphatase 153 (H) 45 - 117 U/L    Protein, total 7.3 6.4 - 8.2 g/dL    Albumin 3.6 3.5 - 5.0 g/dL    Globulin 3.7 2.0 - 4.0 g/dL    A-G Ratio 1.0 (L) 1.1 - 2.2     MAGNESIUM    Collection Time: 02/28/23  5:47 PM   Result Value Ref Range    Magnesium 2.0 1.6 - 2.4 mg/dL        IMAGING:  CT HEAD WO CONT   Final Result   No acute intracranial hemorrhage, mass or infarct. Medications During Visit:  Medications - No data to display      IMPRESSION:  1. Chronic kidney disease, unspecified CKD stage    2.  Weakness        DISPOSITION:  Discharged      Discharge Medication List as of 2/28/2023  7:52 PM           Follow-up Information       Follow up With Specialties Details Why Contact Info    [facility] EMERGENCY DEP Emergency Medicine  If symptoms worsen [address] [facility]  [phone] [address] [employer]  Schedule an appointment as soon as possible for a visit   [facility] [phone]    [facility]  Schedule an appointment as soon as possible for a visit   [address]  [phone]              The patient is asked to follow-up with their primary care provider in the next several days. They are to call tomorrow for an appointment. The patient is asked to return promptly for any increased concerns or worsening of symptoms. They can return to this emergency department or any other emergency department.        Susan Ambriz, DO

## 2023-02-28 NOTE — ED TRIAGE NOTES
Pt ambulatory to triage for c/o his left leg \"giving out\" when he was trying to walk to the mailbox. States \"it just felt like it wasn't there anymore\". Sx lasted a few mins and started approx 1600. Denies falling. Denies pain.  Pt states he \"feels fine now\"    1720: Dr. Kemi James in triage for eval

## 2023-03-17 ENCOUNTER — TELEPHONE (OUTPATIENT)
Dept: INTERNAL MEDICINE CLINIC | Age: 88
End: 2023-03-17

## 2023-03-17 NOTE — TELEPHONE ENCOUNTER
----- Message from Brianna Leahy sent at 3/17/2023  9:32 AM EDT -----  Subject: Appointment Request    Reason for Call: New Patient/New to Provider Appointment needed: New   Patient Request Appointment    QUESTIONS    Reason for appointment request? No appointments available during search     Additional Information for Provider?  Pt would like to schedule a new pt   appt with Dr. Farhana Rivero, his son Denisa Munoz is a current pt and   referred him.   ---------------------------------------------------------------------------  --------------  [address] [employer]  [phone]; OK to leave message on voicemail  ---------------------------------------------------------------------------  --------------  SCRIPT ANSWERS  COVID Screen: Adrián Villalpando

## 2023-05-31 ENCOUNTER — TELEPHONE (OUTPATIENT)
Age: 88
End: 2023-05-31

## 2023-06-20 ENCOUNTER — OFFICE VISIT (OUTPATIENT)
Age: 88
End: 2023-06-20
Payer: MEDICARE

## 2023-06-20 VITALS
TEMPERATURE: 97.2 F | BODY MASS INDEX: 31.23 KG/M2 | OXYGEN SATURATION: 99 % | RESPIRATION RATE: 16 BRPM | HEART RATE: 64 BPM | DIASTOLIC BLOOD PRESSURE: 78 MMHG | HEIGHT: 67 IN | SYSTOLIC BLOOD PRESSURE: 150 MMHG | WEIGHT: 199 LBS

## 2023-06-20 DIAGNOSIS — I10 HYPERTENSION, UNSPECIFIED TYPE: ICD-10-CM

## 2023-06-20 DIAGNOSIS — E66.9 OBESITY (BMI 30.0-34.9): ICD-10-CM

## 2023-06-20 DIAGNOSIS — I25.10 CORONARY ARTERY DISEASE INVOLVING NATIVE CORONARY ARTERY OF NATIVE HEART WITHOUT ANGINA PECTORIS: ICD-10-CM

## 2023-06-20 DIAGNOSIS — N18.30 STAGE 3 CHRONIC KIDNEY DISEASE, UNSPECIFIED WHETHER STAGE 3A OR 3B CKD (HCC): Primary | ICD-10-CM

## 2023-06-20 DIAGNOSIS — E78.5 HYPERLIPIDEMIA, UNSPECIFIED HYPERLIPIDEMIA TYPE: ICD-10-CM

## 2023-06-20 PROBLEM — E66.811 OBESITY (BMI 30.0-34.9): Status: ACTIVE | Noted: 2023-06-20

## 2023-06-20 PROCEDURE — 1123F ACP DISCUSS/DSCN MKR DOCD: CPT | Performed by: INTERNAL MEDICINE

## 2023-06-20 PROCEDURE — 99203 OFFICE O/P NEW LOW 30 MIN: CPT | Performed by: INTERNAL MEDICINE

## 2023-06-20 PROCEDURE — G8417 CALC BMI ABV UP PARAM F/U: HCPCS | Performed by: INTERNAL MEDICINE

## 2023-06-20 PROCEDURE — G8427 DOCREV CUR MEDS BY ELIG CLIN: HCPCS | Performed by: INTERNAL MEDICINE

## 2023-06-20 PROCEDURE — 4004F PT TOBACCO SCREEN RCVD TLK: CPT | Performed by: INTERNAL MEDICINE

## 2023-06-20 SDOH — ECONOMIC STABILITY: INCOME INSECURITY: HOW HARD IS IT FOR YOU TO PAY FOR THE VERY BASICS LIKE FOOD, HOUSING, MEDICAL CARE, AND HEATING?: NOT HARD AT ALL

## 2023-06-20 SDOH — ECONOMIC STABILITY: FOOD INSECURITY: WITHIN THE PAST 12 MONTHS, THE FOOD YOU BOUGHT JUST DIDN'T LAST AND YOU DIDN'T HAVE MONEY TO GET MORE.: NEVER TRUE

## 2023-06-20 SDOH — ECONOMIC STABILITY: FOOD INSECURITY: WITHIN THE PAST 12 MONTHS, YOU WORRIED THAT YOUR FOOD WOULD RUN OUT BEFORE YOU GOT MONEY TO BUY MORE.: NEVER TRUE

## 2023-06-20 SDOH — ECONOMIC STABILITY: HOUSING INSECURITY
IN THE LAST 12 MONTHS, WAS THERE A TIME WHEN YOU DID NOT HAVE A STEADY PLACE TO SLEEP OR SLEPT IN A SHELTER (INCLUDING NOW)?: NO

## 2023-06-20 ASSESSMENT — PATIENT HEALTH QUESTIONNAIRE - PHQ9
SUM OF ALL RESPONSES TO PHQ QUESTIONS 1-9: 0
2. FEELING DOWN, DEPRESSED OR HOPELESS: 0
1. LITTLE INTEREST OR PLEASURE IN DOING THINGS: 0
SUM OF ALL RESPONSES TO PHQ9 QUESTIONS 1 & 2: 0
SUM OF ALL RESPONSES TO PHQ QUESTIONS 1-9: 0

## 2023-06-20 ASSESSMENT — ENCOUNTER SYMPTOMS
EYES NEGATIVE: 1
ALLERGIC/IMMUNOLOGIC NEGATIVE: 1
RESPIRATORY NEGATIVE: 1
GASTROINTESTINAL NEGATIVE: 1

## 2023-06-20 NOTE — PROGRESS NOTES
1. \"Have you been to the ER, urgent care clinic since your last visit? Hospitalized since your last visit? \" No    2. \"Have you seen or consulted any other health care providers outside of the [address] [place] [address] since your last visit? \" No     3. For patients aged 39-70: Has the patient had a colonoscopy / FIT/ Cologuard? No      If the patient is female:    4. For patients aged 41-77: Has the patient had a mammogram within the past 2 years? No      5. For patients aged 21-65: Has the patient had a pap smear?   No

## 2023-06-20 NOTE — PROGRESS NOTES
HISTORY OF PRESENT ILLNESS   Alfonso Romero   is a 81 y.o.  male. 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 No etoh  Patient Active Problem List    Diagnosis Date Noted    Pure hypercholesterolemia 02/10/2011    CAD (coronary artery disease)     Essential hypertension, benign     S/P CABG (coronary artery bypass graft) 05/07/2015    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     Current Outpatient Medications   Medication Sig Dispense Refill    aspirin 81 MG EC tablet Take by mouth daily      hydroCHLOROthiazide (HYDRODIURIL) 25 MG tablet Take 12.5 mg by mouth daily      metoprolol succinate (TOPROL XL) 50 MG extended release tablet TAKE 1 TABLET BY MOUTH EVERY DAY      simvastatin (ZOCOR) 40 MG tablet TAKE 1 TABLET BY MOUTH EVERY DAY AT NIGHT       No current facility-administered medications for this visit. 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 The ejection fraction was 50-60%    CORONARY ARTERY BYPASS GRAFT  2000    ECHO 2D ADULT  2001    LVH, normal LV wall motion and ejection fraction and no significant valvular pathology. No family history on file.   Social History     Tobacco Use    Smoking status: Never

## 2023-06-29 ENCOUNTER — HOSPITAL ENCOUNTER (OUTPATIENT)
Facility: HOSPITAL | Age: 88
Discharge: HOME | End: 2023-06-29
Attending: INTERNAL MEDICINE
Payer: MEDICARE

## 2023-06-29 DIAGNOSIS — N18.30 STAGE 3 CHRONIC KIDNEY DISEASE, UNSPECIFIED WHETHER STAGE 3A OR 3B CKD (HCC): ICD-10-CM

## 2023-06-29 PROCEDURE — 76770 US EXAM ABDO BACK WALL COMP: CPT

## 2023-11-22 DIAGNOSIS — E78.00 PURE HYPERCHOLESTEROLEMIA, UNSPECIFIED: ICD-10-CM

## 2023-11-22 DIAGNOSIS — I25.10 ATHEROSCLEROTIC HEART DISEASE OF NATIVE CORONARY ARTERY WITHOUT ANGINA PECTORIS: ICD-10-CM

## 2023-11-22 RX ORDER — SIMVASTATIN 40 MG
TABLET ORAL
Qty: 90 TABLET | Refills: 0 | Status: SHIPPED | OUTPATIENT
Start: 2023-11-22

## 2023-11-22 NOTE — TELEPHONE ENCOUNTER
Requested Prescriptions     Signed Prescriptions Disp Refills    simvastatin (ZOCOR) 40 MG tablet 90 tablet 0     Sig: TAKE 1 TABLET BY MOUTH EVERY DAY AT NIGHT     Authorizing Provider: Doc Fisher     Ordering User:  Lavelle Phlegm per Md    Future Appointments   Date Time Provider [address] [place] [address]   12/14/2023  1:40 PM MD JORGE Shook AMB   12/28/2023  2:30 PM Jyoti Palma MD [facility] BS AMB

## 2023-12-14 ENCOUNTER — OFFICE VISIT (OUTPATIENT)
Age: 88
End: 2023-12-14
Payer: MEDICARE

## 2023-12-14 VITALS
OXYGEN SATURATION: 98 % | WEIGHT: 207 LBS | DIASTOLIC BLOOD PRESSURE: 82 MMHG | HEIGHT: 67 IN | SYSTOLIC BLOOD PRESSURE: 144 MMHG | HEART RATE: 55 BPM | BODY MASS INDEX: 32.49 KG/M2

## 2023-12-14 DIAGNOSIS — Z95.1 S/P CABG (CORONARY ARTERY BYPASS GRAFT): ICD-10-CM

## 2023-12-14 DIAGNOSIS — E78.00 PURE HYPERCHOLESTEROLEMIA, UNSPECIFIED: ICD-10-CM

## 2023-12-14 DIAGNOSIS — I10 ESSENTIAL (PRIMARY) HYPERTENSION: ICD-10-CM

## 2023-12-14 DIAGNOSIS — Q24.8 LEFT VENTRICULAR OUTFLOW TRACT OBSTRUCTION: ICD-10-CM

## 2023-12-14 DIAGNOSIS — I25.10 ATHEROSCLEROSIS OF NATIVE CORONARY ARTERY OF NATIVE HEART WITHOUT ANGINA PECTORIS: Primary | ICD-10-CM

## 2023-12-14 PROCEDURE — 99214 OFFICE O/P EST MOD 30 MIN: CPT | Performed by: SPECIALIST

## 2023-12-14 PROCEDURE — 93005 ELECTROCARDIOGRAM TRACING: CPT | Performed by: SPECIALIST

## 2023-12-14 RX ORDER — TAMSULOSIN HYDROCHLORIDE 0.4 MG/1
CAPSULE ORAL
COMMUNITY
Start: 2023-11-17

## 2023-12-25 NOTE — PROGRESS NOTES
HISTORY OF PRESENT ILLNESS   Yoan Mckeon   is a 81 y.o.  male. FU htn hld cad s/p cabg 1990'a, lvot obstruction obesity ckd 3 and medicare wellness--    CARD-Dr Selvin Fuchs recent OV and bmp requested  Referred to Nephro Dr Jono Gomes last OV for CKD 3 due to HTN-saw Dr Jono Gomes at least once. Per his note was started on flomax  Pt denies difficutly voiding  Daughter not certain about any fu visit with Dr Dean Thakkar      Denies any cp or sob    His daughter lives with him  She repoprts some mild forgetfullness but pt able to drive and basically independent  Occassional cough    On flomax for ? BPH-denies any difficulty with urination      Last OV  Has not had a PCP      CARD Dr Selvin Fuchs not had cp /angina since cabg per pt  Walks for exercise  Last LDL 73     Bun/cr 29/1.95 this year in ER for weakness--was advised to nephrologist     His lives with daughter now     She reports he has been very compliant with medicines  Only has slight forgetfullness     Walks for exercise  Stable weight  No cp sob   No complaints  Patient Active Problem List    Diagnosis Date Noted    Pure hypercholesterolemia 02/10/2011    Obesity (BMI 30.0-34.9) 06/20/2023    CAD (coronary artery disease)     Essential hypertension, benign     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      No Known Allergies  Social History     Tobacco Use    Smoking

## 2023-12-26 DIAGNOSIS — I25.10 ATHEROSCLEROTIC HEART DISEASE OF NATIVE CORONARY ARTERY WITHOUT ANGINA PECTORIS: ICD-10-CM

## 2023-12-26 DIAGNOSIS — E78.00 PURE HYPERCHOLESTEROLEMIA, UNSPECIFIED: ICD-10-CM

## 2023-12-27 RX ORDER — SIMVASTATIN 40 MG
TABLET ORAL
Qty: 90 TABLET | Refills: 3 | Status: SHIPPED | OUTPATIENT
Start: 2023-12-27

## 2023-12-27 NOTE — TELEPHONE ENCOUNTER
Per VO by MD.    Future Appointments   Date Time Provider [address]   12/28/2023  2:30 PM Sadaf Dowell MD [facility] BS AMB   12/12/2024  1:40 PM MD JORGE Gallo BS AMB

## 2023-12-28 ENCOUNTER — OFFICE VISIT (OUTPATIENT)
Age: 88
End: 2023-12-28
Payer: MEDICARE

## 2023-12-28 VITALS
OXYGEN SATURATION: 97 % | HEIGHT: 67 IN | BODY MASS INDEX: 32.58 KG/M2 | HEART RATE: 68 BPM | TEMPERATURE: 97.3 F | SYSTOLIC BLOOD PRESSURE: 132 MMHG | WEIGHT: 207.6 LBS | DIASTOLIC BLOOD PRESSURE: 68 MMHG | RESPIRATION RATE: 16 BRPM

## 2023-12-28 DIAGNOSIS — I10 ESSENTIAL (PRIMARY) HYPERTENSION: ICD-10-CM

## 2023-12-28 DIAGNOSIS — I25.10 CORONARY ARTERY DISEASE INVOLVING NATIVE CORONARY ARTERY OF NATIVE HEART WITHOUT ANGINA PECTORIS: ICD-10-CM

## 2023-12-28 DIAGNOSIS — Z23 ENCOUNTER FOR IMMUNIZATION: Primary | ICD-10-CM

## 2023-12-28 DIAGNOSIS — E78.5 HYPERLIPIDEMIA, UNSPECIFIED HYPERLIPIDEMIA TYPE: ICD-10-CM

## 2023-12-28 DIAGNOSIS — Z00.00 MEDICARE ANNUAL WELLNESS VISIT, SUBSEQUENT: ICD-10-CM

## 2023-12-28 DIAGNOSIS — Q24.8 LEFT VENTRICULAR OUTFLOW TRACT OBSTRUCTION: ICD-10-CM

## 2023-12-28 DIAGNOSIS — I10 HYPERTENSION, UNSPECIFIED TYPE: ICD-10-CM

## 2023-12-28 DIAGNOSIS — N18.30 STAGE 3 CHRONIC KIDNEY DISEASE, UNSPECIFIED WHETHER STAGE 3A OR 3B CKD (HCC): ICD-10-CM

## 2023-12-28 DIAGNOSIS — I25.10 ATHEROSCLEROSIS OF NATIVE CORONARY ARTERY OF NATIVE HEART WITHOUT ANGINA PECTORIS: ICD-10-CM

## 2023-12-28 PROCEDURE — 1036F TOBACCO NON-USER: CPT | Performed by: INTERNAL MEDICINE

## 2023-12-28 PROCEDURE — G8484 FLU IMMUNIZE NO ADMIN: HCPCS | Performed by: INTERNAL MEDICINE

## 2023-12-28 PROCEDURE — G0009 ADMIN PNEUMOCOCCAL VACCINE: HCPCS | Performed by: INTERNAL MEDICINE

## 2023-12-28 PROCEDURE — PBSHW PNEUMOCOCCAL, PCV20, PREVNAR 20, (AGE 6W+), IM, PF: Performed by: INTERNAL MEDICINE

## 2023-12-28 PROCEDURE — 99214 OFFICE O/P EST MOD 30 MIN: CPT | Performed by: INTERNAL MEDICINE

## 2023-12-28 PROCEDURE — G0439 PPPS, SUBSEQ VISIT: HCPCS | Performed by: INTERNAL MEDICINE

## 2023-12-28 PROCEDURE — G8427 DOCREV CUR MEDS BY ELIG CLIN: HCPCS | Performed by: INTERNAL MEDICINE

## 2023-12-28 PROCEDURE — 1123F ACP DISCUSS/DSCN MKR DOCD: CPT | Performed by: INTERNAL MEDICINE

## 2023-12-28 PROCEDURE — G8417 CALC BMI ABV UP PARAM F/U: HCPCS | Performed by: INTERNAL MEDICINE

## 2023-12-28 NOTE — PROGRESS NOTES
1. \"Have you been to the ER, urgent care clinic since your last visit? Hospitalized since your last visit? \" No    2. \"Have you seen or consulted any other health care providers outside of the [address] [place] [address] since your last visit? \"       Podiatrist     3. For patients aged 43-73: Has the patient had a colonoscopy / FIT/ Cologuard?  No

## 2023-12-29 LAB
ALBUMIN SERPL-MCNC: 3.7 G/DL (ref 3.5–5)
ALBUMIN/GLOB SERPL: 1.1 (ref 1.1–2.2)
ALP SERPL-CCNC: 137 U/L (ref 45–117)
ALT SERPL-CCNC: 18 U/L (ref 12–78)
ANION GAP SERPL CALC-SCNC: 4 MMOL/L (ref 5–15)
ANION GAP SERPL CALC-SCNC: 6 MMOL/L (ref 5–15)
AST SERPL-CCNC: 20 U/L (ref 15–37)
BILIRUB SERPL-MCNC: 0.5 MG/DL (ref 0.2–1)
BUN SERPL-MCNC: 14 MG/DL (ref 6–20)
BUN SERPL-MCNC: 14 MG/DL (ref 6–20)
BUN/CREAT SERPL: 10 (ref 12–20)
BUN/CREAT SERPL: 9 (ref 12–20)
CALCIUM SERPL-MCNC: 8.4 MG/DL (ref 8.5–10.1)
CALCIUM SERPL-MCNC: 9.2 MG/DL (ref 8.5–10.1)
CHLORIDE SERPL-SCNC: 106 MMOL/L (ref 97–108)
CHLORIDE SERPL-SCNC: 107 MMOL/L (ref 97–108)
CHOLEST SERPL-MCNC: 133 MG/DL
CO2 SERPL-SCNC: 28 MMOL/L (ref 21–32)
CO2 SERPL-SCNC: 28 MMOL/L (ref 21–32)
CREAT SERPL-MCNC: 1.45 MG/DL (ref 0.7–1.3)
CREAT SERPL-MCNC: 1.5 MG/DL (ref 0.7–1.3)
GLOBULIN SER CALC-MCNC: 3.5 G/DL (ref 2–4)
GLUCOSE SERPL-MCNC: 76 MG/DL (ref 65–100)
GLUCOSE SERPL-MCNC: 78 MG/DL (ref 65–100)
HDLC SERPL-MCNC: 40 MG/DL
HDLC SERPL: 3.3 (ref 0–5)
LDLC SERPL CALC-MCNC: 59.8 MG/DL (ref 0–100)
POTASSIUM SERPL-SCNC: 4.8 MMOL/L (ref 3.5–5.1)
POTASSIUM SERPL-SCNC: 4.8 MMOL/L (ref 3.5–5.1)
PROT SERPL-MCNC: 7.2 G/DL (ref 6.4–8.2)
SODIUM SERPL-SCNC: 139 MMOL/L (ref 136–145)
SODIUM SERPL-SCNC: 140 MMOL/L (ref 136–145)
TRIGL SERPL-MCNC: 166 MG/DL
VLDLC SERPL CALC-MCNC: 33.2 MG/DL

## 2023-12-30 DIAGNOSIS — I10 ESSENTIAL (PRIMARY) HYPERTENSION: ICD-10-CM

## 2023-12-30 DIAGNOSIS — I25.10 ATHEROSCLEROTIC HEART DISEASE OF NATIVE CORONARY ARTERY WITHOUT ANGINA PECTORIS: ICD-10-CM

## 2024-01-02 RX ORDER — METOPROLOL SUCCINATE 50 MG/1
TABLET, EXTENDED RELEASE ORAL
Qty: 90 TABLET | Refills: 3 | Status: SHIPPED | OUTPATIENT
Start: 2024-01-02

## 2024-01-02 NOTE — TELEPHONE ENCOUNTER
Requested Prescriptions     Signed Prescriptions Disp Refills    metoprolol succinate (TOPROL XL) 50 MG extended release tablet 90 tablet 3     Sig: TAKE 1 TABLET BY MOUTH EVERY DAY     Authorizing Provider: TYREL GOMEZ     Ordering User: ALMA ROSA BARBOSA MD.     Future Appointments   Date Time Provider Department Center   7/2/2024 11:00 AM Filipe Johnston MD [facility] BS AMB   12/12/2024  1:40 PM Tyrel Gomez MD [facility] BS AMB

## 2024-03-23 ENCOUNTER — APPOINTMENT (OUTPATIENT)
Facility: HOSPITAL | Age: 89
DRG: 035 | End: 2024-03-23
Payer: MEDICARE

## 2024-03-23 ENCOUNTER — HOSPITAL ENCOUNTER (INPATIENT)
Facility: HOSPITAL | Age: 89
LOS: 7 days | Discharge: HOME | DRG: 035 | End: 2024-04-01
Attending: EMERGENCY MEDICINE | Admitting: INTERNAL MEDICINE
Payer: MEDICARE

## 2024-03-23 DIAGNOSIS — R29.898 LEFT ARM WEAKNESS: Primary | ICD-10-CM

## 2024-03-23 DIAGNOSIS — R09.89 ABSENT PEDAL PULSES: ICD-10-CM

## 2024-03-23 DIAGNOSIS — I63.9 ACUTE CVA (CEREBROVASCULAR ACCIDENT) (HCC): ICD-10-CM

## 2024-03-23 DIAGNOSIS — I65.21 INTERNAL CAROTID ARTERY STENOSIS, RIGHT: ICD-10-CM

## 2024-03-23 DIAGNOSIS — R09.89 DIMINISHED PULSES IN LOWER EXTREMITY: ICD-10-CM

## 2024-03-23 PROBLEM — R53.1 LEFT-SIDED WEAKNESS: Status: ACTIVE | Noted: 2024-03-23

## 2024-03-23 LAB
ALBUMIN SERPL-MCNC: 3.2 G/DL (ref 3.5–5)
ALBUMIN/GLOB SERPL: 0.9 (ref 1.1–2.2)
ALP SERPL-CCNC: 142 U/L (ref 45–117)
ALT SERPL-CCNC: 16 U/L (ref 12–78)
ANION GAP SERPL CALC-SCNC: 3 MMOL/L (ref 5–15)
APPEARANCE UR: CLEAR
AST SERPL-CCNC: 14 U/L (ref 15–37)
BACTERIA URNS QL MICRO: NEGATIVE /HPF
BASOPHILS # BLD: 0.1 K/UL (ref 0–0.1)
BASOPHILS NFR BLD: 1 % (ref 0–1)
BILIRUB SERPL-MCNC: 0.4 MG/DL (ref 0.2–1)
BILIRUB UR QL: NEGATIVE
BUN SERPL-MCNC: 12 MG/DL (ref 6–20)
BUN/CREAT SERPL: 8 (ref 12–20)
CALCIUM SERPL-MCNC: 8.8 MG/DL (ref 8.5–10.1)
CHLORIDE SERPL-SCNC: 109 MMOL/L (ref 97–108)
CO2 SERPL-SCNC: 25 MMOL/L (ref 21–32)
COLOR UR: NORMAL
COMMENT:: NORMAL
CREAT SERPL-MCNC: 1.58 MG/DL (ref 0.7–1.3)
DIFFERENTIAL METHOD BLD: NORMAL
EOSINOPHIL # BLD: 0.2 K/UL (ref 0–0.4)
EOSINOPHIL NFR BLD: 3 % (ref 0–7)
EPITH CASTS URNS QL MICRO: NORMAL /LPF
ERYTHROCYTE [DISTWIDTH] IN BLOOD BY AUTOMATED COUNT: 13.7 % (ref 11.5–14.5)
GLOBULIN SER CALC-MCNC: 3.6 G/DL (ref 2–4)
GLUCOSE BLD STRIP.AUTO-MCNC: 96 MG/DL (ref 65–117)
GLUCOSE SERPL-MCNC: 112 MG/DL (ref 65–100)
GLUCOSE UR STRIP.AUTO-MCNC: NEGATIVE MG/DL
HCT VFR BLD AUTO: 45.4 % (ref 36.6–50.3)
HGB BLD-MCNC: 14.3 G/DL (ref 12.1–17)
HGB UR QL STRIP: NEGATIVE
HYALINE CASTS URNS QL MICRO: NORMAL /LPF (ref 0–5)
IMM GRANULOCYTES # BLD AUTO: 0 K/UL (ref 0–0.04)
IMM GRANULOCYTES NFR BLD AUTO: 0 % (ref 0–0.5)
INR PPP: 1 (ref 0.9–1.1)
KETONES UR QL STRIP.AUTO: NEGATIVE MG/DL
LEUKOCYTE ESTERASE UR QL STRIP.AUTO: NEGATIVE
LYMPHOCYTES # BLD: 2.8 K/UL (ref 0.8–3.5)
LYMPHOCYTES NFR BLD: 38 % (ref 12–49)
MCH RBC QN AUTO: 29.9 PG (ref 26–34)
MCHC RBC AUTO-ENTMCNC: 31.5 G/DL (ref 30–36.5)
MCV RBC AUTO: 94.8 FL (ref 80–99)
MONOCYTES # BLD: 1 K/UL (ref 0–1)
MONOCYTES NFR BLD: 13 % (ref 5–13)
NEUTS SEG # BLD: 3.4 K/UL (ref 1.8–8)
NEUTS SEG NFR BLD: 45 % (ref 32–75)
NITRITE UR QL STRIP.AUTO: NEGATIVE
NRBC # BLD: 0 K/UL (ref 0–0.01)
NRBC BLD-RTO: 0 PER 100 WBC
PH UR STRIP: 7.5 (ref 5–8)
PLATELET # BLD AUTO: 167 K/UL (ref 150–400)
PMV BLD AUTO: 10.4 FL (ref 8.9–12.9)
POTASSIUM SERPL-SCNC: 4.6 MMOL/L (ref 3.5–5.1)
PROT SERPL-MCNC: 6.8 G/DL (ref 6.4–8.2)
PROT UR STRIP-MCNC: NEGATIVE MG/DL
PROTHROMBIN TIME: 10.9 SEC (ref 9–11.1)
RBC # BLD AUTO: 4.79 M/UL (ref 4.1–5.7)
RBC #/AREA URNS HPF: NORMAL /HPF (ref 0–5)
SERVICE CMNT-IMP: NORMAL
SODIUM SERPL-SCNC: 137 MMOL/L (ref 136–145)
SP GR UR REFRACTOMETRY: 1.01 (ref 1–1.03)
SPECIMEN HOLD: NORMAL
TROPONIN I SERPL HS-MCNC: 20 NG/L (ref 0–76)
UROBILINOGEN UR QL STRIP.AUTO: 1 EU/DL (ref 0.2–1)
WBC # BLD AUTO: 7.4 K/UL (ref 4.1–11.1)
WBC URNS QL MICRO: NORMAL /HPF (ref 0–4)

## 2024-03-23 PROCEDURE — 84484 ASSAY OF TROPONIN QUANT: CPT

## 2024-03-23 PROCEDURE — 6360000004 HC RX CONTRAST MEDICATION: Performed by: RADIOLOGY

## 2024-03-23 PROCEDURE — 82962 GLUCOSE BLOOD TEST: CPT

## 2024-03-23 PROCEDURE — G0378 HOSPITAL OBSERVATION PER HR: HCPCS

## 2024-03-23 PROCEDURE — 36415 COLL VENOUS BLD VENIPUNCTURE: CPT

## 2024-03-23 PROCEDURE — 85025 COMPLETE CBC W/AUTO DIFF WBC: CPT

## 2024-03-23 PROCEDURE — 93005 ELECTROCARDIOGRAM TRACING: CPT | Performed by: EMERGENCY MEDICINE

## 2024-03-23 PROCEDURE — 6370000000 HC RX 637 (ALT 250 FOR IP): Performed by: EMERGENCY MEDICINE

## 2024-03-23 PROCEDURE — 99285 EMERGENCY DEPT VISIT HI MDM: CPT

## 2024-03-23 PROCEDURE — 70450 CT HEAD/BRAIN W/O DYE: CPT

## 2024-03-23 PROCEDURE — 85610 PROTHROMBIN TIME: CPT

## 2024-03-23 PROCEDURE — 80053 COMPREHEN METABOLIC PANEL: CPT

## 2024-03-23 PROCEDURE — 70498 CT ANGIOGRAPHY NECK: CPT

## 2024-03-23 PROCEDURE — 4A03X5D MEASUREMENT OF ARTERIAL FLOW, INTRACRANIAL, EXTERNAL APPROACH: ICD-10-PCS | Performed by: STUDENT IN AN ORGANIZED HEALTH CARE EDUCATION/TRAINING PROGRAM

## 2024-03-23 PROCEDURE — 81001 URINALYSIS AUTO W/SCOPE: CPT

## 2024-03-23 PROCEDURE — 0042T CT BRAIN PERFUSION: CPT

## 2024-03-23 PROCEDURE — 99291 CRITICAL CARE FIRST HOUR: CPT

## 2024-03-23 RX ORDER — CLOPIDOGREL 300 MG/1
300 TABLET, FILM COATED ORAL ONCE
Status: COMPLETED | OUTPATIENT
Start: 2024-03-23 | End: 2024-03-23

## 2024-03-23 RX ADMIN — IOPAMIDOL 80 ML: 755 INJECTION, SOLUTION INTRAVENOUS at 17:39

## 2024-03-23 RX ADMIN — CLOPIDOGREL BISULFATE 300 MG: 300 TABLET, FILM COATED ORAL at 18:36

## 2024-03-23 RX ADMIN — IOPAMIDOL 40 ML: 755 INJECTION, SOLUTION INTRAVENOUS at 17:38

## 2024-03-23 ASSESSMENT — LIFESTYLE VARIABLES
HOW MANY STANDARD DRINKS CONTAINING ALCOHOL DO YOU HAVE ON A TYPICAL DAY: PATIENT DOES NOT DRINK
HOW OFTEN DO YOU HAVE A DRINK CONTAINING ALCOHOL: NEVER

## 2024-03-23 ASSESSMENT — PAIN - FUNCTIONAL ASSESSMENT: PAIN_FUNCTIONAL_ASSESSMENT: NONE - DENIES PAIN

## 2024-03-23 NOTE — ED NOTES
5:02 PM  I have evaluated the patient as the Provider in Rapid Medical Evaluation (RME). I have reviewed his vital signs and the triage nurse assessment. I have talked with the patient and any available family and advised that I am the provider in triage and have ordered the appropriate study to initiate their work up based on the clinical presentation during my assessment. I have advised that the patient will be accommodated in the Main ED as soon as possible. I have also requested to contact the triage nurse or myself immediately if the patient experiences any changes in their condition during this brief waiting period.    Left hand weakness since 0900 today.  Not anticoagulated.      VIANNEY Aguilar Lindsay H, PA  03/23/24 1708

## 2024-03-23 NOTE — ED TRIAGE NOTES
Patient is coming in with left hand numbness that started at 0900. When patient woke up this morning the hand was okay. Patient has drift to the left arm. Denies speech or visual disturbances. CODE S LEVEL 2 called.

## 2024-03-23 NOTE — ED PROVIDER NOTES
[facility] EMERGENCY DEP  EMERGENCY DEPARTMENT ENCOUNTER      Pt Name: Yosef Ruvalcaba  MRN: [id number]  Birthdate 1935  Date of evaluation: 3/23/2024  Provider: Vik Bryson MD      HISTORY OF PRESENT ILLNESS      88-year-old male history of coronary disease, hypertension who is status post CABG presents to the emergency department with his family with a chief complaint of left hand/arm weakness.  Began this morning around 9 AM.  No other weakness.  No headache.  No syncope.  No chest pain or shortness of breath.  He denies history of stroke.  He tells me he takes aspirin daily but no blood thinners.    The history is provided by the patient, medical records and a relative.           Nursing Notes were reviewed.    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 The ejection fraction was 50-60%    CORONARY ARTERY BYPASS GRAFT  2000    ECHO 2D ADULT  2001    LVH, normal LV wall motion and ejection fraction and no significant valvular pathology.         CURRENT MEDICATIONS       Previous Medications    ASPIRIN 81 MG EC TABLET    Take by mouth daily    HYDROCHLOROTHIAZIDE (HYDRODIURIL) 25 MG TABLET    Take 0.5 tablets by mouth daily    METOPROLOL SUCCINATE (TOPROL XL) 50 MG EXTENDED RELEASE TABLET    TAKE 1 TABLET BY MOUTH EVERY DAY    SIMVASTATIN (ZOCOR) 40 MG TABLET    TAKE 1 TABLET BY MOUTH EVERY DAY AT NIGHT    TAMSULOSIN (FLOMAX) 0.4 MG CAPSULE    TAKE 1 CAPSULE BY MOUTH EVERYDAY AT BEDTIME       ALLERGIES     Patient has no known allergies.    FAMILY HISTORY     History

## 2024-03-24 ENCOUNTER — APPOINTMENT (OUTPATIENT)
Facility: HOSPITAL | Age: 89
DRG: 035 | End: 2024-03-24
Payer: MEDICARE

## 2024-03-24 PROBLEM — R73.03 PREDIABETES: Status: ACTIVE | Noted: 2024-03-24

## 2024-03-24 PROBLEM — I63.511 ACUTE ISCHEMIC RIGHT MCA STROKE (HCC): Status: ACTIVE | Noted: 2024-03-24

## 2024-03-24 PROBLEM — I65.21 CAROTID STENOSIS, RIGHT: Status: ACTIVE | Noted: 2024-03-24

## 2024-03-24 PROBLEM — E78.5 HYPERLIPIDEMIA: Status: ACTIVE | Noted: 2024-03-24

## 2024-03-24 PROBLEM — R29.898 LEFT ARM WEAKNESS: Status: ACTIVE | Noted: 2024-03-24

## 2024-03-24 PROBLEM — I63.9 ACUTE CVA (CEREBROVASCULAR ACCIDENT) (HCC): Status: ACTIVE | Noted: 2024-03-24

## 2024-03-24 LAB
ALBUMIN SERPL-MCNC: 2.7 G/DL (ref 3.5–5)
ALBUMIN/GLOB SERPL: 0.9 (ref 1.1–2.2)
ALP SERPL-CCNC: 118 U/L (ref 45–117)
ALT SERPL-CCNC: 12 U/L (ref 12–78)
AMPHET UR QL SCN: NEGATIVE
ANION GAP SERPL CALC-SCNC: 2 MMOL/L (ref 5–15)
ASPIRIN: 422 ARU
AST SERPL-CCNC: 10 U/L (ref 15–37)
BARBITURATES UR QL SCN: NEGATIVE
BASOPHILS # BLD: 0 K/UL (ref 0–0.1)
BASOPHILS NFR BLD: 1 % (ref 0–1)
BENZODIAZ UR QL: NEGATIVE
BILIRUB SERPL-MCNC: 0.6 MG/DL (ref 0.2–1)
BUN SERPL-MCNC: 10 MG/DL (ref 6–20)
BUN/CREAT SERPL: 10 (ref 12–20)
CALCIUM SERPL-MCNC: 7.1 MG/DL (ref 8.5–10.1)
CANNABINOIDS UR QL SCN: NEGATIVE
CHLORIDE SERPL-SCNC: 114 MMOL/L (ref 97–108)
CHOLEST SERPL-MCNC: 112 MG/DL
CO2 SERPL-SCNC: 24 MMOL/L (ref 21–32)
COCAINE UR QL SCN: NEGATIVE
COMMENT:: NORMAL
COMMENT:: NORMAL
CREAT SERPL-MCNC: 1.05 MG/DL (ref 0.7–1.3)
CRP SERPL-MCNC: <0.29 MG/DL (ref 0–0.3)
DIFFERENTIAL METHOD BLD: ABNORMAL
EKG ATRIAL RATE: 70 BPM
EKG DIAGNOSIS: NORMAL
EKG P AXIS: -49 DEGREES
EKG P-R INTERVAL: 284 MS
EKG Q-T INTERVAL: 420 MS
EKG QRS DURATION: 88 MS
EKG QTC CALCULATION (BAZETT): 453 MS
EKG R AXIS: -28 DEGREES
EKG T AXIS: 144 DEGREES
EKG VENTRICULAR RATE: 70 BPM
EOSINOPHIL # BLD: 0.1 K/UL (ref 0–0.4)
EOSINOPHIL NFR BLD: 1 % (ref 0–7)
ERYTHROCYTE [DISTWIDTH] IN BLOOD BY AUTOMATED COUNT: 13.7 % (ref 11.5–14.5)
EST. AVERAGE GLUCOSE BLD GHB EST-MCNC: 120 MG/DL
FOLATE SERPL-MCNC: 2.1 NG/ML (ref 5–21)
GLOBULIN SER CALC-MCNC: 3.1 G/DL (ref 2–4)
GLUCOSE SERPL-MCNC: 88 MG/DL (ref 65–100)
HBA1C MFR BLD: 5.8 % (ref 4–5.6)
HCT VFR BLD AUTO: 41.3 % (ref 36.6–50.3)
HDLC SERPL-MCNC: 48 MG/DL
HDLC SERPL: 2.3 (ref 0–5)
HGB BLD-MCNC: 13.5 G/DL (ref 12.1–17)
IMM GRANULOCYTES # BLD AUTO: 0 K/UL (ref 0–0.04)
IMM GRANULOCYTES NFR BLD AUTO: 0 % (ref 0–0.5)
LDLC SERPL CALC-MCNC: 53.6 MG/DL (ref 0–100)
LYMPHOCYTES # BLD: 1.8 K/UL (ref 0.8–3.5)
LYMPHOCYTES NFR BLD: 22 % (ref 12–49)
Lab: NORMAL
MAGNESIUM SERPL-MCNC: 1.3 MG/DL (ref 1.6–2.4)
MCH RBC QN AUTO: 30.2 PG (ref 26–34)
MCHC RBC AUTO-ENTMCNC: 32.7 G/DL (ref 30–36.5)
MCV RBC AUTO: 92.4 FL (ref 80–99)
METHADONE UR QL: NEGATIVE
MONOCYTES # BLD: 0.9 K/UL (ref 0–1)
MONOCYTES NFR BLD: 12 % (ref 5–13)
NEUTS SEG # BLD: 5 K/UL (ref 1.8–8)
NEUTS SEG NFR BLD: 64 % (ref 32–75)
NRBC # BLD: 0 K/UL (ref 0–0.01)
NRBC BLD-RTO: 0 PER 100 WBC
OPIATES UR QL: NEGATIVE
P2Y12 PLT RESPONSE: 209 PRU (ref 194–418)
PCP UR QL: NEGATIVE
PHOSPHATE SERPL-MCNC: 1.9 MG/DL (ref 2.6–4.7)
PLATELET # BLD AUTO: 148 K/UL (ref 150–400)
PMV BLD AUTO: 10.5 FL (ref 8.9–12.9)
POTASSIUM SERPL-SCNC: 3.1 MMOL/L (ref 3.5–5.1)
PROT SERPL-MCNC: 5.8 G/DL (ref 6.4–8.2)
RBC # BLD AUTO: 4.47 M/UL (ref 4.1–5.7)
SODIUM SERPL-SCNC: 140 MMOL/L (ref 136–145)
SPECIMEN HOLD: NORMAL
SPECIMEN HOLD: NORMAL
TRIGL SERPL-MCNC: 52 MG/DL
TROPONIN I SERPL HS-MCNC: 39 NG/L (ref 0–76)
TSH SERPL DL<=0.05 MIU/L-ACNC: 1.86 UIU/ML (ref 0.36–3.74)
VIT B12 SERPL-MCNC: 237 PG/ML (ref 193–986)
VLDLC SERPL CALC-MCNC: 10.4 MG/DL
WBC # BLD AUTO: 7.9 K/UL (ref 4.1–11.1)

## 2024-03-24 PROCEDURE — 6370000000 HC RX 637 (ALT 250 FOR IP): Performed by: INTERNAL MEDICINE

## 2024-03-24 PROCEDURE — 85025 COMPLETE CBC W/AUTO DIFF WBC: CPT

## 2024-03-24 PROCEDURE — 97166 OT EVAL MOD COMPLEX 45 MIN: CPT

## 2024-03-24 PROCEDURE — 6370000000 HC RX 637 (ALT 250 FOR IP): Performed by: PSYCHIATRY & NEUROLOGY

## 2024-03-24 PROCEDURE — 80307 DRUG TEST PRSMV CHEM ANLYZR: CPT

## 2024-03-24 PROCEDURE — 6370000000 HC RX 637 (ALT 250 FOR IP): Performed by: NURSE PRACTITIONER

## 2024-03-24 PROCEDURE — 82607 VITAMIN B-12: CPT

## 2024-03-24 PROCEDURE — G0378 HOSPITAL OBSERVATION PER HR: HCPCS

## 2024-03-24 PROCEDURE — 6360000002 HC RX W HCPCS: Performed by: INTERNAL MEDICINE

## 2024-03-24 PROCEDURE — 96365 THER/PROPH/DIAG IV INF INIT: CPT

## 2024-03-24 PROCEDURE — 96372 THER/PROPH/DIAG INJ SC/IM: CPT

## 2024-03-24 PROCEDURE — 80053 COMPREHEN METABOLIC PANEL: CPT

## 2024-03-24 PROCEDURE — 05HY33Z INSERTION OF INFUSION DEVICE INTO UPPER VEIN, PERCUTANEOUS APPROACH: ICD-10-PCS | Performed by: STUDENT IN AN ORGANIZED HEALTH CARE EDUCATION/TRAINING PROGRAM

## 2024-03-24 PROCEDURE — 86140 C-REACTIVE PROTEIN: CPT

## 2024-03-24 PROCEDURE — 96361 HYDRATE IV INFUSION ADD-ON: CPT

## 2024-03-24 PROCEDURE — 2709999900 HC NON-CHARGEABLE SUPPLY

## 2024-03-24 PROCEDURE — 83735 ASSAY OF MAGNESIUM: CPT

## 2024-03-24 PROCEDURE — 85576 BLOOD PLATELET AGGREGATION: CPT

## 2024-03-24 PROCEDURE — 36415 COLL VENOUS BLD VENIPUNCTURE: CPT

## 2024-03-24 PROCEDURE — 84443 ASSAY THYROID STIM HORMONE: CPT

## 2024-03-24 PROCEDURE — 82746 ASSAY OF FOLIC ACID SERUM: CPT

## 2024-03-24 PROCEDURE — 97161 PT EVAL LOW COMPLEX 20 MIN: CPT

## 2024-03-24 PROCEDURE — 97116 GAIT TRAINING THERAPY: CPT

## 2024-03-24 PROCEDURE — 97535 SELF CARE MNGMENT TRAINING: CPT

## 2024-03-24 PROCEDURE — 76937 US GUIDE VASCULAR ACCESS: CPT

## 2024-03-24 PROCEDURE — 84484 ASSAY OF TROPONIN QUANT: CPT

## 2024-03-24 PROCEDURE — 83036 HEMOGLOBIN GLYCOSYLATED A1C: CPT

## 2024-03-24 PROCEDURE — 80061 LIPID PANEL: CPT

## 2024-03-24 PROCEDURE — 94761 N-INVAS EAR/PLS OXIMETRY MLT: CPT

## 2024-03-24 PROCEDURE — 2580000003 HC RX 258: Performed by: INTERNAL MEDICINE

## 2024-03-24 PROCEDURE — 70551 MRI BRAIN STEM W/O DYE: CPT

## 2024-03-24 PROCEDURE — 96366 THER/PROPH/DIAG IV INF ADDON: CPT

## 2024-03-24 PROCEDURE — APPNB45 APP NON BILLABLE 31-45 MINUTES

## 2024-03-24 PROCEDURE — 84100 ASSAY OF PHOSPHORUS: CPT

## 2024-03-24 PROCEDURE — 99223 1ST HOSP IP/OBS HIGH 75: CPT | Performed by: PSYCHIATRY & NEUROLOGY

## 2024-03-24 RX ORDER — CLOPIDOGREL BISULFATE 75 MG/1
150 TABLET ORAL ONCE
Status: COMPLETED | OUTPATIENT
Start: 2024-03-24 | End: 2024-03-24

## 2024-03-24 RX ORDER — ENOXAPARIN SODIUM 100 MG/ML
40 INJECTION SUBCUTANEOUS DAILY
Status: DISCONTINUED | OUTPATIENT
Start: 2024-03-24 | End: 2024-04-01 | Stop reason: HOSPADM

## 2024-03-24 RX ORDER — POTASSIUM CHLORIDE 750 MG/1
40 TABLET, FILM COATED, EXTENDED RELEASE ORAL EVERY 4 HOURS
Status: COMPLETED | OUTPATIENT
Start: 2024-03-24 | End: 2024-03-24

## 2024-03-24 RX ORDER — ONDANSETRON 4 MG/1
4 TABLET, ORALLY DISINTEGRATING ORAL EVERY 8 HOURS PRN
Status: DISCONTINUED | OUTPATIENT
Start: 2024-03-24 | End: 2024-04-01 | Stop reason: HOSPADM

## 2024-03-24 RX ORDER — ATORVASTATIN CALCIUM 40 MG/1
40 TABLET, FILM COATED ORAL DAILY
Status: DISCONTINUED | OUTPATIENT
Start: 2024-03-24 | End: 2024-04-01 | Stop reason: HOSPADM

## 2024-03-24 RX ORDER — POLYETHYLENE GLYCOL 3350 17 G/17G
17 POWDER, FOR SOLUTION ORAL DAILY PRN
Status: DISCONTINUED | OUTPATIENT
Start: 2024-03-24 | End: 2024-04-01 | Stop reason: HOSPADM

## 2024-03-24 RX ORDER — ASPIRIN 81 MG/1
243 TABLET, CHEWABLE ORAL ONCE
Status: COMPLETED | OUTPATIENT
Start: 2024-03-24 | End: 2024-03-24

## 2024-03-24 RX ORDER — ASPIRIN 300 MG/1
300 SUPPOSITORY RECTAL DAILY
Status: DISCONTINUED | OUTPATIENT
Start: 2024-03-24 | End: 2024-03-24

## 2024-03-24 RX ORDER — MAGNESIUM SULFATE IN WATER 40 MG/ML
2000 INJECTION, SOLUTION INTRAVENOUS ONCE
Status: COMPLETED | OUTPATIENT
Start: 2024-03-24 | End: 2024-03-24

## 2024-03-24 RX ORDER — SODIUM CHLORIDE 9 MG/ML
INJECTION, SOLUTION INTRAVENOUS PRN
Status: DISCONTINUED | OUTPATIENT
Start: 2024-03-24 | End: 2024-04-01 | Stop reason: HOSPADM

## 2024-03-24 RX ORDER — CLOPIDOGREL BISULFATE 75 MG/1
75 TABLET ORAL DAILY
Status: DISCONTINUED | OUTPATIENT
Start: 2024-03-25 | End: 2024-03-30

## 2024-03-24 RX ORDER — METOPROLOL SUCCINATE 50 MG/1
50 TABLET, EXTENDED RELEASE ORAL DAILY
Status: DISCONTINUED | OUTPATIENT
Start: 2024-03-24 | End: 2024-04-01 | Stop reason: HOSPADM

## 2024-03-24 RX ORDER — ONDANSETRON 2 MG/ML
4 INJECTION INTRAMUSCULAR; INTRAVENOUS EVERY 6 HOURS PRN
Status: DISCONTINUED | OUTPATIENT
Start: 2024-03-24 | End: 2024-04-01 | Stop reason: HOSPADM

## 2024-03-24 RX ORDER — SODIUM CHLORIDE 0.9 % (FLUSH) 0.9 %
5-40 SYRINGE (ML) INJECTION EVERY 12 HOURS SCHEDULED
Status: DISCONTINUED | OUTPATIENT
Start: 2024-03-24 | End: 2024-04-01 | Stop reason: HOSPADM

## 2024-03-24 RX ORDER — HYDROCHLOROTHIAZIDE 25 MG/1
12.5 TABLET ORAL DAILY
Status: DISCONTINUED | OUTPATIENT
Start: 2024-03-24 | End: 2024-03-25

## 2024-03-24 RX ORDER — CLOPIDOGREL BISULFATE 75 MG/1
75 TABLET ORAL DAILY
Status: DISCONTINUED | OUTPATIENT
Start: 2024-03-24 | End: 2024-03-24

## 2024-03-24 RX ORDER — SODIUM CHLORIDE 9 MG/ML
INJECTION, SOLUTION INTRAVENOUS CONTINUOUS
Status: DISCONTINUED | OUTPATIENT
Start: 2024-03-24 | End: 2024-03-24

## 2024-03-24 RX ORDER — TAMSULOSIN HYDROCHLORIDE 0.4 MG/1
0.4 CAPSULE ORAL DAILY
Status: DISCONTINUED | OUTPATIENT
Start: 2024-03-24 | End: 2024-04-01 | Stop reason: HOSPADM

## 2024-03-24 RX ORDER — ASPIRIN 325 MG
325 TABLET ORAL DAILY
Status: DISCONTINUED | OUTPATIENT
Start: 2024-03-25 | End: 2024-03-30

## 2024-03-24 RX ORDER — SODIUM CHLORIDE 0.9 % (FLUSH) 0.9 %
5-40 SYRINGE (ML) INJECTION PRN
Status: DISCONTINUED | OUTPATIENT
Start: 2024-03-24 | End: 2024-04-01 | Stop reason: HOSPADM

## 2024-03-24 RX ORDER — ASPIRIN 81 MG/1
81 TABLET, CHEWABLE ORAL DAILY
Status: DISCONTINUED | OUTPATIENT
Start: 2024-03-24 | End: 2024-03-24

## 2024-03-24 RX ORDER — ATORVASTATIN CALCIUM 40 MG/1
80 TABLET, FILM COATED ORAL NIGHTLY
Status: DISCONTINUED | OUTPATIENT
Start: 2024-03-24 | End: 2024-03-24

## 2024-03-24 RX ADMIN — CLOPIDOGREL BISULFATE 150 MG: 75 TABLET ORAL at 11:57

## 2024-03-24 RX ADMIN — POTASSIUM CHLORIDE 40 MEQ: 750 TABLET, EXTENDED RELEASE ORAL at 16:31

## 2024-03-24 RX ADMIN — METOPROLOL SUCCINATE 50 MG: 50 TABLET, EXTENDED RELEASE ORAL at 09:08

## 2024-03-24 RX ADMIN — POTASSIUM CHLORIDE 40 MEQ: 750 TABLET, EXTENDED RELEASE ORAL at 11:56

## 2024-03-24 RX ADMIN — ENOXAPARIN SODIUM 40 MG: 100 INJECTION SUBCUTANEOUS at 09:08

## 2024-03-24 RX ADMIN — TAMSULOSIN HYDROCHLORIDE 0.4 MG: 0.4 CAPSULE ORAL at 09:08

## 2024-03-24 RX ADMIN — ATORVASTATIN CALCIUM 40 MG: 40 TABLET, FILM COATED ORAL at 17:39

## 2024-03-24 RX ADMIN — ASPIRIN 81 MG CHEWABLE TABLET 243 MG: 81 TABLET CHEWABLE at 11:57

## 2024-03-24 RX ADMIN — ASPIRIN 81 MG CHEWABLE TABLET 81 MG: 81 TABLET CHEWABLE at 09:08

## 2024-03-24 RX ADMIN — HYDROCHLOROTHIAZIDE 12.5 MG: 25 TABLET ORAL at 09:08

## 2024-03-24 RX ADMIN — SODIUM CHLORIDE, PRESERVATIVE FREE 10 ML: 5 INJECTION INTRAVENOUS at 22:43

## 2024-03-24 RX ADMIN — MAGNESIUM SULFATE HEPTAHYDRATE 2000 MG: 40 INJECTION, SOLUTION INTRAVENOUS at 14:22

## 2024-03-24 RX ADMIN — SODIUM CHLORIDE: 9 INJECTION, SOLUTION INTRAVENOUS at 07:05

## 2024-03-24 NOTE — CONSULTS
Neurology Consult Note     NAME: Yosef Ruvalcaba   :  1935   MRN:  [id number]   DATE:  3/24/2024       HPI: Pt is an 89yo LH male admitted 3/23/24 with left hand weakness with onset at 9AM. /87. NIHSS 1 for left arm drift. CTH neg. CTA H/N severe right ICA stenosis, no LVO, no significant IC athero. 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  He is on ASA 81mg and Zocor 40mg daily at home. Plavix 75mg daily added. TTE pending. EKG - NSR. Cr 1.58 . P2Y12 209. HgbA1C 5.8, LDL 53.6. NIS consulted for right ICA stenosis and plan CA with stenting possibly tomorrow. He is seen with is daughter at bedside. No N/T in left hand. No s/s difficulties. No vision changes. No LE involvement. Able to walk normally.  No h/o CVA, no DM, no smoking, no JAJA. +HTN, +HLD, +CAD.     PMH:  CAD s/p CABG  HTN  HLD      ROS:  Per HPI o/w neg.     MEDS:  Home:  Current Outpatient Medications   Medication Instructions    aspirin 81 MG EC tablet Oral, DAILY    hydroCHLOROthiazide (HYDRODIURIL) 12.5 mg, Oral, DAILY    metoprolol succinate (TOPROL XL) 50 MG extended release tablet TAKE 1 TABLET BY MOUTH EVERY DAY    simvastatin (ZOCOR) 40 MG tablet TAKE 1 TABLET BY MOUTH EVERY DAY AT NIGHT    tamsulosin (FLOMAX) 0.4 MG capsule TAKE 1 CAPSULE BY MOUTH EVERYDAY AT BEDTIME         Current Facility-Administered Medications:     hydroCHLOROthiazide (HYDRODIURIL) tablet 12.5 mg, 12.5 mg, Oral, Daily, Jose Enrique Diez MD, 12.5 mg at 24 09    metoprolol succinate (TOPROL XL) extended release tablet 50 mg, 50 mg, Oral, Daily, Jose Enrique Diez MD, 50 mg at 24 09    tamsulosin (FLOMAX) capsule 0.4 mg, 0.4 mg, Oral, Daily, Jose Enrique Diez MD, 0.4 mg at 24 0908    sodium chloride flush 0.9 % injection 5-40 mL, 5-40 mL, IntraVENous, 2 times per day, Chary,

## 2024-03-24 NOTE — CONSULTS
Neurointerventional Surgery Consult  [facility]  Neurocritical Care NP    Patient: Yosef Ruvalcaba MRN: [id number]  SSN: [id number]    YOB: 1935  Age: 88 y.o.  Sex: male        Chief Complaint: left hand weakness     Subjective:      Yosef Ruvalcaba is a 88 y.o. left-handed male with a PMH of CAD, MI s/p CABG, HTN, and hypercholesterolemia who presented to the ED as a stroke alert on 3/23/2024 with complaints of left hand/left arm weakness. LKW time was documented as 0900 AM yesterday, however, patient reports he has been having it on and off, but not able to give me a specific time frame of when it started. CT of Head showed no acute process.  He was not a candidate for TNK. CTA of the head and neck showed severe right ICA stenosis, no large vessel occlusion, or perfusion abnormality seen on CTP. He denies any prior history of a CVA. Patient is not the best historian. I was able to gather some history from the patient's family at bedside. Patient does takes 81 mg of aspirin daily at home. Neurointerventional Surgery is consulted for evaluation due to severe right ICA stenosis seen on CTA. Patient was started on Aspirin and loaded with Plavix for stroke prevention.    He denies any dizziness, headache, vision changes, chest pain, SOB, numbness, tingling, speech difficulty, nausea, vomiting, balance, or coordination issues. He still complains of left hand weakness.       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 The ejection fraction was 50-60%    CORONARY ARTERY BYPASS GRAFT  2000    ECHO 2D ADULT  2001    LVH, normal LV wall motion and ejection

## 2024-03-24 NOTE — PROCEDURES
An extended dwell PIV (AccuCath) was placed using ultrasound guidance. The IV flushed easily and had brisk blood return. The patient tolerated the procedure well.      Catheter Size: 20 gauge.   Total length: 5.7 cm  External length: less than 1 cm.  Location: right ventral forearm     May be used for blood draws--flush, waste 3ml, draw labs, flush post lab draw with at least 10ml NS and clamp if not infusing to maintain patency. (AccuCath is power injectable to 6 mL/sec at 300 psi.)     Bernardo Jordan RN  Vascular Access Nurse

## 2024-03-25 ENCOUNTER — HOSPITAL ENCOUNTER (INPATIENT)
Facility: HOSPITAL | Age: 89
Discharge: HOME | DRG: 035 | End: 2024-03-28
Attending: RADIOLOGY
Payer: MEDICARE

## 2024-03-25 ENCOUNTER — APPOINTMENT (OUTPATIENT)
Facility: HOSPITAL | Age: 89
DRG: 035 | End: 2024-03-25
Attending: INTERNAL MEDICINE
Payer: MEDICARE

## 2024-03-25 VITALS
RESPIRATION RATE: 16 BRPM | OXYGEN SATURATION: 96 % | TEMPERATURE: 97.9 F | DIASTOLIC BLOOD PRESSURE: 96 MMHG | SYSTOLIC BLOOD PRESSURE: 179 MMHG | HEART RATE: 88 BPM

## 2024-03-25 PROBLEM — I65.21 INTERNAL CAROTID ARTERY STENOSIS, RIGHT: Status: ACTIVE | Noted: 2024-03-25

## 2024-03-25 PROBLEM — I63.231 ACUTE CEREBROVASCULAR ACCIDENT (CVA) DUE TO STENOSIS OF RIGHT CAROTID ARTERY (HCC): Status: ACTIVE | Noted: 2024-03-25

## 2024-03-25 LAB
ANION GAP SERPL CALC-SCNC: 5 MMOL/L (ref 5–15)
ASPIRIN: 385 ARU
BUN SERPL-MCNC: 11 MG/DL (ref 6–20)
BUN/CREAT SERPL: 8 (ref 12–20)
CALCIUM SERPL-MCNC: 9.2 MG/DL (ref 8.5–10.1)
CHLORIDE SERPL-SCNC: 106 MMOL/L (ref 97–108)
CO2 SERPL-SCNC: 25 MMOL/L (ref 21–32)
CREAT SERPL-MCNC: 1.36 MG/DL (ref 0.7–1.3)
ECHO BSA: 2.04 M2
ECHO LV FRACTIONAL SHORTENING: 33 % (ref 28–44)
ECHO LV INTERNAL DIMENSION DIASTOLE INDEX: 1.36 CM/M2
ECHO LV INTERNAL DIMENSION DIASTOLIC: 2.7 CM (ref 4.2–5.9)
ECHO LV INTERNAL DIMENSION SYSTOLIC INDEX: 0.9 CM/M2
ECHO LV INTERNAL DIMENSION SYSTOLIC: 1.8 CM
ECHO LV IVSD: 2.5 CM (ref 0.6–1)
ECHO LV MASS 2D: 364.1 G (ref 88–224)
ECHO LV MASS INDEX 2D: 182.9 G/M2 (ref 49–115)
ECHO LV POSTERIOR WALL DIASTOLIC: 2.5 CM (ref 0.6–1)
ECHO LV RELATIVE WALL THICKNESS RATIO: 1.85
ERYTHROCYTE [DISTWIDTH] IN BLOOD BY AUTOMATED COUNT: 13.8 % (ref 11.5–14.5)
GLUCOSE SERPL-MCNC: 104 MG/DL (ref 65–100)
HCT VFR BLD AUTO: 49.2 % (ref 36.6–50.3)
HGB BLD-MCNC: 16 G/DL (ref 12.1–17)
MCH RBC QN AUTO: 30.3 PG (ref 26–34)
MCHC RBC AUTO-ENTMCNC: 32.5 G/DL (ref 30–36.5)
MCV RBC AUTO: 93.2 FL (ref 80–99)
NRBC # BLD: 0 K/UL (ref 0–0.01)
NRBC BLD-RTO: 0 PER 100 WBC
P2Y12 PLT RESPONSE: 165 PRU (ref 194–418)
PLATELET # BLD AUTO: 141 K/UL (ref 150–400)
PMV BLD AUTO: 11 FL (ref 8.9–12.9)
POTASSIUM SERPL-SCNC: 5.2 MMOL/L (ref 3.5–5.1)
RBC # BLD AUTO: 5.28 M/UL (ref 4.1–5.7)
SODIUM SERPL-SCNC: 136 MMOL/L (ref 136–145)
WBC # BLD AUTO: 8 K/UL (ref 4.1–11.1)

## 2024-03-25 PROCEDURE — 6370000000 HC RX 637 (ALT 250 FOR IP): Performed by: PSYCHIATRY & NEUROLOGY

## 2024-03-25 PROCEDURE — G0378 HOSPITAL OBSERVATION PER HR: HCPCS

## 2024-03-25 PROCEDURE — 99232 SBSQ HOSP IP/OBS MODERATE 35: CPT | Performed by: NURSE PRACTITIONER

## 2024-03-25 PROCEDURE — 93308 TTE F-UP OR LMTD: CPT | Performed by: SPECIALIST

## 2024-03-25 PROCEDURE — 36415 COLL VENOUS BLD VENIPUNCTURE: CPT

## 2024-03-25 PROCEDURE — 85576 BLOOD PLATELET AGGREGATION: CPT

## 2024-03-25 PROCEDURE — 93308 TTE F-UP OR LMTD: CPT

## 2024-03-25 PROCEDURE — 6370000000 HC RX 637 (ALT 250 FOR IP): Performed by: NURSE PRACTITIONER

## 2024-03-25 PROCEDURE — 80048 BASIC METABOLIC PNL TOTAL CA: CPT

## 2024-03-25 PROCEDURE — 2580000003 HC RX 258: Performed by: INTERNAL MEDICINE

## 2024-03-25 PROCEDURE — 1100000003 HC PRIVATE W/ TELEMETRY

## 2024-03-25 PROCEDURE — 85027 COMPLETE CBC AUTOMATED: CPT

## 2024-03-25 PROCEDURE — 6370000000 HC RX 637 (ALT 250 FOR IP): Performed by: INTERNAL MEDICINE

## 2024-03-25 PROCEDURE — 6360000002 HC RX W HCPCS: Performed by: INTERNAL MEDICINE

## 2024-03-25 PROCEDURE — 2580000003 HC RX 258: Performed by: NURSE PRACTITIONER

## 2024-03-25 PROCEDURE — 2060000000 HC ICU INTERMEDIATE R&B

## 2024-03-25 PROCEDURE — 93325 DOPPLER ECHO COLOR FLOW MAPG: CPT | Performed by: SPECIALIST

## 2024-03-25 RX ORDER — AMLODIPINE BESYLATE 5 MG/1
10 TABLET ORAL DAILY
Status: DISCONTINUED | OUTPATIENT
Start: 2024-03-25 | End: 2024-03-25

## 2024-03-25 RX ORDER — SODIUM CHLORIDE 9 MG/ML
INJECTION, SOLUTION INTRAVENOUS CONTINUOUS
Status: DISCONTINUED | OUTPATIENT
Start: 2024-03-25 | End: 2024-03-31

## 2024-03-25 RX ORDER — AMLODIPINE BESYLATE 5 MG/1
5 TABLET ORAL DAILY
Status: DISCONTINUED | OUTPATIENT
Start: 2024-03-25 | End: 2024-03-26

## 2024-03-25 RX ADMIN — SODIUM CHLORIDE: 9 INJECTION, SOLUTION INTRAVENOUS at 22:04

## 2024-03-25 RX ADMIN — METOPROLOL SUCCINATE 50 MG: 50 TABLET, EXTENDED RELEASE ORAL at 09:25

## 2024-03-25 RX ADMIN — TAMSULOSIN HYDROCHLORIDE 0.4 MG: 0.4 CAPSULE ORAL at 09:27

## 2024-03-25 RX ADMIN — SODIUM CHLORIDE, PRESERVATIVE FREE 10 ML: 5 INJECTION INTRAVENOUS at 09:28

## 2024-03-25 RX ADMIN — SODIUM CHLORIDE: 9 INJECTION, SOLUTION INTRAVENOUS at 09:34

## 2024-03-25 RX ADMIN — ATORVASTATIN CALCIUM 40 MG: 40 TABLET, FILM COATED ORAL at 09:25

## 2024-03-25 RX ADMIN — CLOPIDOGREL BISULFATE 75 MG: 75 TABLET ORAL at 09:27

## 2024-03-25 RX ADMIN — ASPIRIN 325 MG: 325 TABLET ORAL at 09:25

## 2024-03-25 RX ADMIN — SODIUM CHLORIDE, PRESERVATIVE FREE 10 ML: 5 INJECTION INTRAVENOUS at 22:10

## 2024-03-25 RX ADMIN — HYDROCHLOROTHIAZIDE 12.5 MG: 25 TABLET ORAL at 09:25

## 2024-03-25 RX ADMIN — AMLODIPINE BESYLATE 5 MG: 5 TABLET ORAL at 12:24

## 2024-03-25 RX ADMIN — ENOXAPARIN SODIUM 40 MG: 100 INJECTION SUBCUTANEOUS at 09:25

## 2024-03-25 NOTE — CARE COORDINATION
Care Management Initial Assessment       RUR:16%  Readmission? No  1st IM letter given? No  1st  letter given: No     Patient lives with his supportive daughter and expects to return at discharge.  Lilia Ruvalcaba [phone]     03/25/24 1213   Service Assessment   Patient Orientation Alert and Oriented   Cognition Alert   History Provided By Child/Family   Primary Caregiver Family   Accompanied By/Relationship Lilia Ruvalcaba(Daughter) [phone]   Support Systems Children   Patient's Healthcare Decision Maker is: Legal Next of Kin   PCP Verified by CM Yes   Last Visit to PCP Within last 6 months   Prior Functional Level Independent in ADLs/IADLs   Current Functional Level Assistance with the following:;Mobility;Shopping;Housework;Cooking   Can patient return to prior living arrangement Yes   Ability to make needs known: Fair   Family able to assist with home care needs: Yes   Would you like for me to discuss the discharge plan with any other family members/significant others, and if so, who? Yes  (Lilia Ruvalcaba [phone])   Financial Resources Medicare   Social/Functional History   Lives With Daughter   Type of Home House   Home Layout One level   Home Access Stairs to enter with rails   Entrance Stairs - Number of Steps 4   Entrance Stairs - Rails Left   Bathroom Shower/Tub Tub/Shower unit   Home Equipment None   ADL Assistance Independent   Homemaking Assistance Independent   Homemaking Responsibilities Yes   Ambulation Assistance Independent   Transfer Assistance Independent   Active  Yes   Mode of Transportation Car   Occupation Retired   Discharge Planning   Patient expects to be discharged to: House   Services At/After Discharge   [place] Resource Information Provided? No   Mode of Transport at Discharge   (Daughter-Car)   Confirm Follow Up Transport Family     CM met with patient and his daughter at bedside to introduce self/role and begin discharge planning.    Patient lives in a

## 2024-03-25 NOTE — CONSULTS
Nephrology Consult Note      Assessment:  CKD stage 3a: Baseline serum Cr 1.3-1.4mg/dl. At risk for JALEEL (3/23/24)    Hyperkalemia: mild->was given oral Kcl yesterday for hypokalemia    HTN: elevated (permissive)    Right ICA stenosis    Multiple acute R MCA infarcts      Plan/Recommendations:  IVF  Trend K-> should come down-> Low K diet  Plan for R ICA angioplasty by neuro-interventional team   Trend renal indices  Am labs    Discussed with patient/family  Thanks for the consultation. Renal service will follow patient with you. Please contact me with any questions or concerns.    Initial Consult note         Patient name: Yosef Ruvalcaba  MR no: [id number]  Date of admission: 3/23/2024  Date of consultation: 3/25/24  Requested by: Dr. Radha Knowles  Reason for consult: ROYER/CKD    Patient seen and examined. History obtained from patient and chart review. Relevant labs, data and notes reviewed.      HPI: Yosef Ruvalcaba is a 88 y.o. male with PMH significant for CKD stage 3a, HTN, CAD s/p CABG presented with left hand weakness. CTA showed significant right ICA stenosis. MRI c/w multiple acute right MCA infarcts. Serum K 5.2 and Cr 1.3mg/dl. Nephrology consulted to evaluate and manage ORYER/CKD. Patient was seen by me last August 2023 for CKD management -> patient does not remember that visit. No recent NSAID use.    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 The ejection fraction was 50-60%    CORONARY ARTERY BYPASS GRAFT  2000    ECHO 2D ADULT  2001    LVH, normal LV wall motion and ejection fraction and no significant valvular pathology.        Social history:

## 2024-03-25 NOTE — PROGRESS NOTES
TRANSFER - IN REPORT:    Verbal report received from BAIRON Fowler on Yosef Ruvalcaba  being received from [phone] for ordered procedure      Report consisted of patient's Situation, Background, Assessment and   Recommendations(SBAR).     Information from the following report(s) Nurse Handoff Report was reviewed with the receiving nurse.    Opportunity for questions and clarification was provided.      Assessment completed upon patient's arrival to unit and care assumed.

## 2024-03-26 LAB
ALBUMIN SERPL-MCNC: 3.3 G/DL (ref 3.5–5)
ALBUMIN/GLOB SERPL: 1 (ref 1.1–2.2)
ALP SERPL-CCNC: 140 U/L (ref 45–117)
ALT SERPL-CCNC: 12 U/L (ref 12–78)
ANION GAP SERPL CALC-SCNC: 5 MMOL/L (ref 5–15)
ASPIRIN: 392 ARU
AST SERPL-CCNC: 13 U/L (ref 15–37)
BILIRUB SERPL-MCNC: 0.9 MG/DL (ref 0.2–1)
BUN SERPL-MCNC: 15 MG/DL (ref 6–20)
BUN/CREAT SERPL: 10 (ref 12–20)
CALCIUM SERPL-MCNC: 9.2 MG/DL (ref 8.5–10.1)
CHLORIDE SERPL-SCNC: 106 MMOL/L (ref 97–108)
CO2 SERPL-SCNC: 23 MMOL/L (ref 21–32)
CREAT SERPL-MCNC: 1.47 MG/DL (ref 0.7–1.3)
ERYTHROCYTE [DISTWIDTH] IN BLOOD BY AUTOMATED COUNT: 13.4 % (ref 11.5–14.5)
GLOBULIN SER CALC-MCNC: 3.4 G/DL (ref 2–4)
GLUCOSE SERPL-MCNC: 98 MG/DL (ref 65–100)
HCT VFR BLD AUTO: 47.1 % (ref 36.6–50.3)
HGB BLD-MCNC: 14.9 G/DL (ref 12.1–17)
MAGNESIUM SERPL-MCNC: 2.1 MG/DL (ref 1.6–2.4)
MCH RBC QN AUTO: 29.2 PG (ref 26–34)
MCHC RBC AUTO-ENTMCNC: 31.6 G/DL (ref 30–36.5)
MCV RBC AUTO: 92.4 FL (ref 80–99)
NRBC # BLD: 0 K/UL (ref 0–0.01)
NRBC BLD-RTO: 0 PER 100 WBC
P2Y12 PLT RESPONSE: 146 PRU (ref 194–418)
PHOSPHATE SERPL-MCNC: 3 MG/DL (ref 2.6–4.7)
PLATELET # BLD AUTO: 154 K/UL (ref 150–400)
PMV BLD AUTO: 11 FL (ref 8.9–12.9)
POTASSIUM SERPL-SCNC: 4.8 MMOL/L (ref 3.5–5.1)
PROT SERPL-MCNC: 6.7 G/DL (ref 6.4–8.2)
RBC # BLD AUTO: 5.1 M/UL (ref 4.1–5.7)
SODIUM SERPL-SCNC: 134 MMOL/L (ref 136–145)
WBC # BLD AUTO: 7.4 K/UL (ref 4.1–11.1)

## 2024-03-26 PROCEDURE — 2060000000 HC ICU INTERMEDIATE R&B

## 2024-03-26 PROCEDURE — 36415 COLL VENOUS BLD VENIPUNCTURE: CPT

## 2024-03-26 PROCEDURE — 99232 SBSQ HOSP IP/OBS MODERATE 35: CPT | Performed by: NURSE PRACTITIONER

## 2024-03-26 PROCEDURE — 97112 NEUROMUSCULAR REEDUCATION: CPT

## 2024-03-26 PROCEDURE — 97535 SELF CARE MNGMENT TRAINING: CPT

## 2024-03-26 PROCEDURE — 6360000002 HC RX W HCPCS: Performed by: INTERNAL MEDICINE

## 2024-03-26 PROCEDURE — 85027 COMPLETE CBC AUTOMATED: CPT

## 2024-03-26 PROCEDURE — 6370000000 HC RX 637 (ALT 250 FOR IP): Performed by: NURSE PRACTITIONER

## 2024-03-26 PROCEDURE — 83735 ASSAY OF MAGNESIUM: CPT

## 2024-03-26 PROCEDURE — 6370000000 HC RX 637 (ALT 250 FOR IP): Performed by: INTERNAL MEDICINE

## 2024-03-26 PROCEDURE — 2580000003 HC RX 258: Performed by: INTERNAL MEDICINE

## 2024-03-26 PROCEDURE — 92610 EVALUATE SWALLOWING FUNCTION: CPT

## 2024-03-26 PROCEDURE — 80053 COMPREHEN METABOLIC PANEL: CPT

## 2024-03-26 PROCEDURE — 2580000003 HC RX 258: Performed by: NURSE PRACTITIONER

## 2024-03-26 PROCEDURE — 85576 BLOOD PLATELET AGGREGATION: CPT

## 2024-03-26 PROCEDURE — 84100 ASSAY OF PHOSPHORUS: CPT

## 2024-03-26 PROCEDURE — 6370000000 HC RX 637 (ALT 250 FOR IP): Performed by: PSYCHIATRY & NEUROLOGY

## 2024-03-26 RX ORDER — AMLODIPINE BESYLATE 5 MG/1
10 TABLET ORAL DAILY
Status: DISCONTINUED | OUTPATIENT
Start: 2024-03-26 | End: 2024-04-01 | Stop reason: HOSPADM

## 2024-03-26 RX ADMIN — SODIUM CHLORIDE, PRESERVATIVE FREE 10 ML: 5 INJECTION INTRAVENOUS at 08:32

## 2024-03-26 RX ADMIN — SODIUM CHLORIDE: 9 INJECTION, SOLUTION INTRAVENOUS at 14:04

## 2024-03-26 RX ADMIN — ATORVASTATIN CALCIUM 40 MG: 40 TABLET, FILM COATED ORAL at 08:31

## 2024-03-26 RX ADMIN — SODIUM CHLORIDE, PRESERVATIVE FREE 10 ML: 5 INJECTION INTRAVENOUS at 21:00

## 2024-03-26 RX ADMIN — CLOPIDOGREL BISULFATE 75 MG: 75 TABLET ORAL at 08:31

## 2024-03-26 RX ADMIN — METOPROLOL SUCCINATE 50 MG: 50 TABLET, EXTENDED RELEASE ORAL at 08:31

## 2024-03-26 RX ADMIN — TAMSULOSIN HYDROCHLORIDE 0.4 MG: 0.4 CAPSULE ORAL at 08:31

## 2024-03-26 RX ADMIN — ENOXAPARIN SODIUM 40 MG: 100 INJECTION SUBCUTANEOUS at 08:31

## 2024-03-26 RX ADMIN — ASPIRIN 325 MG: 325 TABLET ORAL at 08:31

## 2024-03-26 RX ADMIN — AMLODIPINE BESYLATE 10 MG: 5 TABLET ORAL at 08:31

## 2024-03-27 LAB
ALBUMIN SERPL-MCNC: 3 G/DL (ref 3.5–5)
ALBUMIN/GLOB SERPL: 1 (ref 1.1–2.2)
ALP SERPL-CCNC: 127 U/L (ref 45–117)
ALT SERPL-CCNC: 12 U/L (ref 12–78)
ANION GAP SERPL CALC-SCNC: 6 MMOL/L (ref 5–15)
AST SERPL-CCNC: 19 U/L (ref 15–37)
BILIRUB SERPL-MCNC: 0.7 MG/DL (ref 0.2–1)
BUN SERPL-MCNC: 20 MG/DL (ref 6–20)
BUN/CREAT SERPL: 14 (ref 12–20)
CALCIUM SERPL-MCNC: 8.7 MG/DL (ref 8.5–10.1)
CHLORIDE SERPL-SCNC: 109 MMOL/L (ref 97–108)
CO2 SERPL-SCNC: 21 MMOL/L (ref 21–32)
CREAT SERPL-MCNC: 1.42 MG/DL (ref 0.7–1.3)
GLOBULIN SER CALC-MCNC: 3 G/DL (ref 2–4)
GLUCOSE SERPL-MCNC: 89 MG/DL (ref 65–100)
MAGNESIUM SERPL-MCNC: 2 MG/DL (ref 1.6–2.4)
PHOSPHATE SERPL-MCNC: 3.2 MG/DL (ref 2.6–4.7)
POTASSIUM SERPL-SCNC: 4.9 MMOL/L (ref 3.5–5.1)
PROT SERPL-MCNC: 6 G/DL (ref 6.4–8.2)
SODIUM SERPL-SCNC: 136 MMOL/L (ref 136–145)

## 2024-03-27 PROCEDURE — 99232 SBSQ HOSP IP/OBS MODERATE 35: CPT

## 2024-03-27 PROCEDURE — 84100 ASSAY OF PHOSPHORUS: CPT

## 2024-03-27 PROCEDURE — 83735 ASSAY OF MAGNESIUM: CPT

## 2024-03-27 PROCEDURE — 97116 GAIT TRAINING THERAPY: CPT

## 2024-03-27 PROCEDURE — 6370000000 HC RX 637 (ALT 250 FOR IP): Performed by: NURSE PRACTITIONER

## 2024-03-27 PROCEDURE — 97530 THERAPEUTIC ACTIVITIES: CPT

## 2024-03-27 PROCEDURE — 2580000003 HC RX 258: Performed by: INTERNAL MEDICINE

## 2024-03-27 PROCEDURE — 80053 COMPREHEN METABOLIC PANEL: CPT

## 2024-03-27 PROCEDURE — 36415 COLL VENOUS BLD VENIPUNCTURE: CPT

## 2024-03-27 PROCEDURE — 6370000000 HC RX 637 (ALT 250 FOR IP): Performed by: PSYCHIATRY & NEUROLOGY

## 2024-03-27 PROCEDURE — 6360000002 HC RX W HCPCS: Performed by: INTERNAL MEDICINE

## 2024-03-27 PROCEDURE — 6370000000 HC RX 637 (ALT 250 FOR IP): Performed by: INTERNAL MEDICINE

## 2024-03-27 PROCEDURE — 99231 SBSQ HOSP IP/OBS SF/LOW 25: CPT | Performed by: NURSE PRACTITIONER

## 2024-03-27 PROCEDURE — 2580000003 HC RX 258: Performed by: NURSE PRACTITIONER

## 2024-03-27 PROCEDURE — 2060000000 HC ICU INTERMEDIATE R&B

## 2024-03-27 RX ADMIN — AMLODIPINE BESYLATE 10 MG: 5 TABLET ORAL at 08:28

## 2024-03-27 RX ADMIN — SODIUM CHLORIDE: 9 INJECTION, SOLUTION INTRAVENOUS at 03:34

## 2024-03-27 RX ADMIN — CLOPIDOGREL BISULFATE 75 MG: 75 TABLET ORAL at 08:28

## 2024-03-27 RX ADMIN — METOPROLOL SUCCINATE 50 MG: 50 TABLET, EXTENDED RELEASE ORAL at 08:27

## 2024-03-27 RX ADMIN — ASPIRIN 325 MG: 325 TABLET ORAL at 08:27

## 2024-03-27 RX ADMIN — SODIUM CHLORIDE, PRESERVATIVE FREE 10 ML: 5 INJECTION INTRAVENOUS at 21:15

## 2024-03-27 RX ADMIN — SODIUM CHLORIDE, PRESERVATIVE FREE 10 ML: 5 INJECTION INTRAVENOUS at 08:28

## 2024-03-27 RX ADMIN — ENOXAPARIN SODIUM 40 MG: 100 INJECTION SUBCUTANEOUS at 08:28

## 2024-03-27 RX ADMIN — ATORVASTATIN CALCIUM 40 MG: 40 TABLET, FILM COATED ORAL at 08:27

## 2024-03-27 RX ADMIN — TAMSULOSIN HYDROCHLORIDE 0.4 MG: 0.4 CAPSULE ORAL at 08:28

## 2024-03-27 ASSESSMENT — PAIN SCALES - GENERAL
PAINLEVEL_OUTOF10: 0

## 2024-03-28 ENCOUNTER — TELEPHONE (OUTPATIENT)
Facility: HOSPITAL | Age: 89
End: 2024-03-28

## 2024-03-28 LAB
ALBUMIN SERPL-MCNC: 3 G/DL (ref 3.5–5)
ALBUMIN/GLOB SERPL: 1 (ref 1.1–2.2)
ALP SERPL-CCNC: 127 U/L (ref 45–117)
ALT SERPL-CCNC: 14 U/L (ref 12–78)
ANION GAP SERPL CALC-SCNC: 5 MMOL/L (ref 5–15)
AST SERPL-CCNC: 19 U/L (ref 15–37)
BILIRUB SERPL-MCNC: 0.5 MG/DL (ref 0.2–1)
BUN SERPL-MCNC: 20 MG/DL (ref 6–20)
BUN/CREAT SERPL: 14 (ref 12–20)
CALCIUM SERPL-MCNC: 8.9 MG/DL (ref 8.5–10.1)
CHLORIDE SERPL-SCNC: 111 MMOL/L (ref 97–108)
CO2 SERPL-SCNC: 22 MMOL/L (ref 21–32)
CREAT SERPL-MCNC: 1.38 MG/DL (ref 0.7–1.3)
GLOBULIN SER CALC-MCNC: 3.1 G/DL (ref 2–4)
GLUCOSE SERPL-MCNC: 97 MG/DL (ref 65–100)
MAGNESIUM SERPL-MCNC: 1.9 MG/DL (ref 1.6–2.4)
PHOSPHATE SERPL-MCNC: 3.2 MG/DL (ref 2.6–4.7)
POTASSIUM SERPL-SCNC: 4.6 MMOL/L (ref 3.5–5.1)
PROT SERPL-MCNC: 6.1 G/DL (ref 6.4–8.2)
SODIUM SERPL-SCNC: 138 MMOL/L (ref 136–145)

## 2024-03-28 PROCEDURE — 6370000000 HC RX 637 (ALT 250 FOR IP): Performed by: INTERNAL MEDICINE

## 2024-03-28 PROCEDURE — 6370000000 HC RX 637 (ALT 250 FOR IP): Performed by: PSYCHIATRY & NEUROLOGY

## 2024-03-28 PROCEDURE — 36415 COLL VENOUS BLD VENIPUNCTURE: CPT

## 2024-03-28 PROCEDURE — 84100 ASSAY OF PHOSPHORUS: CPT

## 2024-03-28 PROCEDURE — 2580000003 HC RX 258: Performed by: INTERNAL MEDICINE

## 2024-03-28 PROCEDURE — 80053 COMPREHEN METABOLIC PANEL: CPT

## 2024-03-28 PROCEDURE — 6360000002 HC RX W HCPCS: Performed by: INTERNAL MEDICINE

## 2024-03-28 PROCEDURE — 6370000000 HC RX 637 (ALT 250 FOR IP): Performed by: NURSE PRACTITIONER

## 2024-03-28 PROCEDURE — 2580000003 HC RX 258: Performed by: NURSE PRACTITIONER

## 2024-03-28 PROCEDURE — 99231 SBSQ HOSP IP/OBS SF/LOW 25: CPT | Performed by: NURSE PRACTITIONER

## 2024-03-28 PROCEDURE — 2060000000 HC ICU INTERMEDIATE R&B

## 2024-03-28 PROCEDURE — 83735 ASSAY OF MAGNESIUM: CPT

## 2024-03-28 PROCEDURE — 97116 GAIT TRAINING THERAPY: CPT

## 2024-03-28 RX ORDER — LANOLIN ALCOHOL/MO/W.PET/CERES
3 CREAM (GRAM) TOPICAL NIGHTLY PRN
Status: DISCONTINUED | OUTPATIENT
Start: 2024-03-28 | End: 2024-04-01 | Stop reason: HOSPADM

## 2024-03-28 RX ADMIN — ENOXAPARIN SODIUM 40 MG: 100 INJECTION SUBCUTANEOUS at 08:21

## 2024-03-28 RX ADMIN — TAMSULOSIN HYDROCHLORIDE 0.4 MG: 0.4 CAPSULE ORAL at 08:21

## 2024-03-28 RX ADMIN — AMLODIPINE BESYLATE 10 MG: 5 TABLET ORAL at 08:21

## 2024-03-28 RX ADMIN — SODIUM CHLORIDE, PRESERVATIVE FREE 10 ML: 5 INJECTION INTRAVENOUS at 20:30

## 2024-03-28 RX ADMIN — METOPROLOL SUCCINATE 50 MG: 50 TABLET, EXTENDED RELEASE ORAL at 08:21

## 2024-03-28 RX ADMIN — ASPIRIN 325 MG: 325 TABLET ORAL at 08:21

## 2024-03-28 RX ADMIN — Medication 3 MG: at 23:23

## 2024-03-28 RX ADMIN — SODIUM CHLORIDE: 9 INJECTION, SOLUTION INTRAVENOUS at 21:59

## 2024-03-28 RX ADMIN — ATORVASTATIN CALCIUM 40 MG: 40 TABLET, FILM COATED ORAL at 08:21

## 2024-03-28 RX ADMIN — CLOPIDOGREL BISULFATE 75 MG: 75 TABLET ORAL at 08:21

## 2024-03-28 RX ADMIN — SODIUM CHLORIDE, PRESERVATIVE FREE 10 ML: 5 INJECTION INTRAVENOUS at 08:22

## 2024-03-28 ASSESSMENT — PAIN SCALES - GENERAL
PAINLEVEL_OUTOF10: 0

## 2024-03-28 NOTE — TELEPHONE ENCOUNTER
Pt needs a hospital follow up appointment  Provider: Any  Location: TBD  In person or virtual: TBD  When: 4-6 weeks from discharge  Diagnosis/reason for follow up:  R MCA stroke  Additional information: plan for stent with NIS for R ICA stenosis 3/29

## 2024-03-28 NOTE — CARE COORDINATION
Transition of Care Plan:anticipate d/c home with daughter and OP PT/OT, pt will need script for this from Attending prior to d/c    RUR: 15%  Prior Level of Functioning: Pt needed assistance with adls and iadls  Disposition: Home with dtr and OP therapy  Follow up appointments: PCP & Specialist  DME needed: none  Transportation at discharge: daughter  IM/IMM Medicare/ letter given: will need 2nd IMM letter prior to d/c  Caregiver Contact: Lilia Ruvalcaba [phone]   Discharge Caregiver contacted prior to discharge? yes  Care Conference needed? no  Barriers to discharge: medical stability  -0900-CM reviewed pt chart and met with pt and dtr in room to discuss HH as recommended by therapy team. Dtr advised she does not want HH and prefer OP therapy. CM will notify Attending via perfect serve and will follow.  Keira Lindo RN BSN CCM

## 2024-03-29 ENCOUNTER — ANESTHESIA EVENT (OUTPATIENT)
Facility: HOSPITAL | Age: 89
End: 2024-03-29
Payer: MEDICARE

## 2024-03-29 ENCOUNTER — ANESTHESIA (OUTPATIENT)
Facility: HOSPITAL | Age: 89
End: 2024-03-29
Payer: MEDICARE

## 2024-03-29 ENCOUNTER — APPOINTMENT (OUTPATIENT)
Facility: HOSPITAL | Age: 89
DRG: 035 | End: 2024-03-29
Payer: MEDICARE

## 2024-03-29 ENCOUNTER — APPOINTMENT (OUTPATIENT)
Facility: HOSPITAL | Age: 89
DRG: 035 | End: 2024-03-29
Attending: RADIOLOGY
Payer: MEDICARE

## 2024-03-29 PROBLEM — R09.89 ABSENT PEDAL PULSES: Status: ACTIVE | Noted: 2024-03-29

## 2024-03-29 LAB
ACT BLD: 141 SECS (ref 79–138)
ACT BLD: 217 SECS (ref 79–138)
ACT BLD: 260 SECS (ref 79–138)
ALBUMIN SERPL-MCNC: 3.1 G/DL (ref 3.5–5)
ALBUMIN/GLOB SERPL: 1 (ref 1.1–2.2)
ALP SERPL-CCNC: 129 U/L (ref 45–117)
ALT SERPL-CCNC: 16 U/L (ref 12–78)
ANION GAP SERPL CALC-SCNC: 5 MMOL/L (ref 5–15)
ASPIRIN: 413 ARU
AST SERPL-CCNC: 17 U/L (ref 15–37)
BILIRUB SERPL-MCNC: 0.6 MG/DL (ref 0.2–1)
BUN SERPL-MCNC: 19 MG/DL (ref 6–20)
BUN/CREAT SERPL: 15 (ref 12–20)
CALCIUM SERPL-MCNC: 9 MG/DL (ref 8.5–10.1)
CHLORIDE SERPL-SCNC: 110 MMOL/L (ref 97–108)
CO2 SERPL-SCNC: 23 MMOL/L (ref 21–32)
CREAT SERPL-MCNC: 1.31 MG/DL (ref 0.7–1.3)
ECHO BSA: 2.04 M2
GLOBULIN SER CALC-MCNC: 3.1 G/DL (ref 2–4)
GLUCOSE SERPL-MCNC: 101 MG/DL (ref 65–100)
P2Y12 PLT RESPONSE: 178 PRU (ref 194–418)
POTASSIUM SERPL-SCNC: 4.5 MMOL/L (ref 3.5–5.1)
PROT SERPL-MCNC: 6.2 G/DL (ref 6.4–8.2)
SODIUM SERPL-SCNC: 138 MMOL/L (ref 136–145)
VAS LEFT ATA DIST PSV: 1.1 CM/S
VAS LEFT CFA PROX PSV: 84.9 CM/S
VAS LEFT PFA PROX PSV: 124.6 CM/S
VAS LEFT POP A DIST PSV: 0 CM/S
VAS LEFT POP A PROX PSV: 33.4 CM/S
VAS LEFT POP A PROX VEL RATIO: 0.6
VAS LEFT PTA DIST PSV: 29.9 CM/S
VAS LEFT SFA DIST PSV: 56 CM/S
VAS LEFT SFA DIST VEL RATIO: 0.7
VAS LEFT SFA MID PSV: 80.1 CM/S
VAS LEFT SFA MID VEL RATIO: 1.16
VAS LEFT SFA PROX PSV: 69.1 CM/S
VAS LEFT SFA PROX VEL RATIO: 0.81
VAS RIGHT ATA PROX PSV: 29 CM/S
VAS RIGHT CFA PROX PSV: 73.9 CM/S
VAS RIGHT PFA PROX PSV: 42.7 CM/S
VAS RIGHT POP A DIST PSV: 29 CM/S
VAS RIGHT POP A PROX PSV: 43.1 CM/S
VAS RIGHT POP A PROX VEL RATIO: 0.15
VAS RIGHT PTA DIST PSV: 31.6 CM/S
VAS RIGHT SFA DIST PSV: 291.8 CM/S
VAS RIGHT SFA DIST VEL RATIO: 6.17
VAS RIGHT SFA MID PSV: 47.3 CM/S
VAS RIGHT SFA MID VEL RATIO: 0.8
VAS RIGHT SFA PROX PSV: 57.9 CM/S
VAS RIGHT SFA PROX VEL RATIO: 0.8

## 2024-03-29 PROCEDURE — 2500000003 HC RX 250 WO HCPCS

## 2024-03-29 PROCEDURE — B3131ZZ FLUOROSCOPY OF RIGHT COMMON CAROTID ARTERY USING LOW OSMOLAR CONTRAST: ICD-10-PCS | Performed by: RADIOLOGY

## 2024-03-29 PROCEDURE — 85347 COAGULATION TIME ACTIVATED: CPT

## 2024-03-29 PROCEDURE — 2500000003 HC RX 250 WO HCPCS: Performed by: RADIOLOGY

## 2024-03-29 PROCEDURE — APPNB45 APP NON BILLABLE 31-45 MINUTES: Performed by: NURSE PRACTITIONER

## 2024-03-29 PROCEDURE — 85576 BLOOD PLATELET AGGREGATION: CPT

## 2024-03-29 PROCEDURE — 2700000000 HC OXYGEN THERAPY PER DAY

## 2024-03-29 PROCEDURE — 6360000002 HC RX W HCPCS: Performed by: INTERNAL MEDICINE

## 2024-03-29 PROCEDURE — 36415 COLL VENOUS BLD VENIPUNCTURE: CPT

## 2024-03-29 PROCEDURE — B3161ZZ FLUOROSCOPY OF RIGHT INTERNAL CAROTID ARTERY USING LOW OSMOLAR CONTRAST: ICD-10-PCS | Performed by: RADIOLOGY

## 2024-03-29 PROCEDURE — 2580000003 HC RX 258: Performed by: INTERNAL MEDICINE

## 2024-03-29 PROCEDURE — 99232 SBSQ HOSP IP/OBS MODERATE 35: CPT | Performed by: NURSE PRACTITIONER

## 2024-03-29 PROCEDURE — 6370000000 HC RX 637 (ALT 250 FOR IP): Performed by: NURSE PRACTITIONER

## 2024-03-29 PROCEDURE — 6370000000 HC RX 637 (ALT 250 FOR IP): Performed by: INTERNAL MEDICINE

## 2024-03-29 PROCEDURE — 6360000002 HC RX W HCPCS

## 2024-03-29 PROCEDURE — 93925 LOWER EXTREMITY STUDY: CPT

## 2024-03-29 PROCEDURE — 6360000002 HC RX W HCPCS: Performed by: NURSE PRACTITIONER

## 2024-03-29 PROCEDURE — 6360000002 HC RX W HCPCS: Performed by: RADIOLOGY

## 2024-03-29 PROCEDURE — 2580000003 HC RX 258: Performed by: NURSE PRACTITIONER

## 2024-03-29 PROCEDURE — 6360000004 HC RX CONTRAST MEDICATION: Performed by: RADIOLOGY

## 2024-03-29 PROCEDURE — 037K3DZ DILATION OF RIGHT INTERNAL CAROTID ARTERY WITH INTRALUMINAL DEVICE, PERCUTANEOUS APPROACH: ICD-10-PCS | Performed by: RADIOLOGY

## 2024-03-29 PROCEDURE — 36620 INSERTION CATHETER ARTERY: CPT

## 2024-03-29 PROCEDURE — 94760 N-INVAS EAR/PLS OXIMETRY 1: CPT

## 2024-03-29 PROCEDURE — 2580000003 HC RX 258

## 2024-03-29 PROCEDURE — 36216 PLACE CATHETER IN ARTERY: CPT

## 2024-03-29 PROCEDURE — 51798 US URINE CAPACITY MEASURE: CPT

## 2024-03-29 PROCEDURE — 03HY32Z INSERTION OF MONITORING DEVICE INTO UPPER ARTERY, PERCUTANEOUS APPROACH: ICD-10-PCS | Performed by: STUDENT IN AN ORGANIZED HEALTH CARE EDUCATION/TRAINING PROGRAM

## 2024-03-29 PROCEDURE — 51700 IRRIGATION OF BLADDER: CPT

## 2024-03-29 PROCEDURE — 2000000000 HC ICU R&B

## 2024-03-29 PROCEDURE — 80053 COMPREHEN METABOLIC PANEL: CPT

## 2024-03-29 PROCEDURE — 6370000000 HC RX 637 (ALT 250 FOR IP): Performed by: PSYCHIATRY & NEUROLOGY

## 2024-03-29 RX ORDER — DEXMEDETOMIDINE HYDROCHLORIDE 100 UG/ML
INJECTION, SOLUTION INTRAVENOUS PRN
Status: DISCONTINUED | OUTPATIENT
Start: 2024-03-29 | End: 2024-03-29 | Stop reason: SDUPTHER

## 2024-03-29 RX ORDER — LIDOCAINE HYDROCHLORIDE 20 MG/ML
INJECTION, SOLUTION EPIDURAL; INFILTRATION; INTRACAUDAL; PERINEURAL PRN
Status: DISCONTINUED | OUTPATIENT
Start: 2024-03-29 | End: 2024-03-29 | Stop reason: SDUPTHER

## 2024-03-29 RX ORDER — GLYCOPYRROLATE 0.2 MG/ML
INJECTION INTRAMUSCULAR; INTRAVENOUS PRN
Status: DISCONTINUED | OUTPATIENT
Start: 2024-03-29 | End: 2024-03-29 | Stop reason: SDUPTHER

## 2024-03-29 RX ORDER — HEPARIN SODIUM 1000 [USP'U]/ML
INJECTION, SOLUTION INTRAVENOUS; SUBCUTANEOUS PRN
Status: DISCONTINUED | OUTPATIENT
Start: 2024-03-29 | End: 2024-03-29 | Stop reason: SDUPTHER

## 2024-03-29 RX ORDER — CLOPIDOGREL BISULFATE 75 MG/1
75 TABLET ORAL ONCE
Status: COMPLETED | OUTPATIENT
Start: 2024-03-29 | End: 2024-03-29

## 2024-03-29 RX ORDER — LIDOCAINE HYDROCHLORIDE 10 MG/ML
INJECTION, SOLUTION EPIDURAL; INFILTRATION; INTRACAUDAL; PERINEURAL PRN
Status: COMPLETED | OUTPATIENT
Start: 2024-03-29 | End: 2024-03-29

## 2024-03-29 RX ORDER — HYDRALAZINE HYDROCHLORIDE 50 MG/1
50 TABLET, FILM COATED ORAL EVERY 8 HOURS SCHEDULED
Status: DISCONTINUED | OUTPATIENT
Start: 2024-03-29 | End: 2024-04-01 | Stop reason: HOSPADM

## 2024-03-29 RX ADMIN — HEPARIN SODIUM 5000 UNITS: 1000 INJECTION, SOLUTION INTRAVENOUS; SUBCUTANEOUS at 12:02

## 2024-03-29 RX ADMIN — ASPIRIN 325 MG: 325 TABLET ORAL at 09:11

## 2024-03-29 RX ADMIN — HYDRALAZINE HYDROCHLORIDE 50 MG: 50 TABLET ORAL at 22:23

## 2024-03-29 RX ADMIN — AMLODIPINE BESYLATE 10 MG: 5 TABLET ORAL at 09:11

## 2024-03-29 RX ADMIN — IOPAMIDOL 58 ML: 612 INJECTION, SOLUTION INTRAVENOUS at 12:38

## 2024-03-29 RX ADMIN — PROPOFOL 25 MG: 10 INJECTION, EMULSION INTRAVENOUS at 11:57

## 2024-03-29 RX ADMIN — DEXMEDETOMIDINE HYDROCHLORIDE 8 MCG: 100 INJECTION, SOLUTION, CONCENTRATE INTRAVENOUS at 11:44

## 2024-03-29 RX ADMIN — PROPOFOL 25 MG: 10 INJECTION, EMULSION INTRAVENOUS at 12:00

## 2024-03-29 RX ADMIN — PROPOFOL 25 MG: 10 INJECTION, EMULSION INTRAVENOUS at 11:46

## 2024-03-29 RX ADMIN — HEPARIN SODIUM 3000 UNITS: 1000 INJECTION, SOLUTION INTRAVENOUS; SUBCUTANEOUS at 12:17

## 2024-03-29 RX ADMIN — GLYCOPYRROLATE 0.4 MG: 0.2 INJECTION INTRAMUSCULAR; INTRAVENOUS at 12:15

## 2024-03-29 RX ADMIN — SODIUM CHLORIDE, PRESERVATIVE FREE 10 ML: 5 INJECTION INTRAVENOUS at 09:15

## 2024-03-29 RX ADMIN — CLOPIDOGREL BISULFATE 75 MG: 75 TABLET ORAL at 18:25

## 2024-03-29 RX ADMIN — LIDOCAINE HYDROCHLORIDE 40 MG: 20 INJECTION, SOLUTION EPIDURAL; INFILTRATION; INTRACAUDAL; PERINEURAL at 11:44

## 2024-03-29 RX ADMIN — ENOXAPARIN SODIUM 40 MG: 100 INJECTION SUBCUTANEOUS at 09:11

## 2024-03-29 RX ADMIN — CLOPIDOGREL BISULFATE 75 MG: 75 TABLET ORAL at 09:11

## 2024-03-29 RX ADMIN — GLYCOPYRROLATE 0.2 MG: 0.2 INJECTION INTRAMUSCULAR; INTRAVENOUS at 12:00

## 2024-03-29 RX ADMIN — SODIUM CHLORIDE: 9 INJECTION, SOLUTION INTRAVENOUS at 14:18

## 2024-03-29 RX ADMIN — SODIUM CHLORIDE 5 MG/HR: 9 INJECTION, SOLUTION INTRAVENOUS at 14:18

## 2024-03-29 RX ADMIN — ATORVASTATIN CALCIUM 40 MG: 40 TABLET, FILM COATED ORAL at 09:12

## 2024-03-29 RX ADMIN — TAMSULOSIN HYDROCHLORIDE 0.4 MG: 0.4 CAPSULE ORAL at 09:11

## 2024-03-29 RX ADMIN — LIDOCAINE HYDROCHLORIDE 10 ML: 10 INJECTION, SOLUTION EPIDURAL; INFILTRATION; INTRACAUDAL; PERINEURAL at 12:37

## 2024-03-29 RX ADMIN — DEXMEDETOMIDINE HYDROCHLORIDE 4 MCG: 100 INJECTION, SOLUTION, CONCENTRATE INTRAVENOUS at 11:55

## 2024-03-29 RX ADMIN — SODIUM CHLORIDE: 9 INJECTION, SOLUTION INTRAVENOUS at 09:15

## 2024-03-29 RX ADMIN — PHENYLEPHRINE HYDROCHLORIDE 80 MCG: 10 INJECTION INTRAVENOUS at 12:02

## 2024-03-29 RX ADMIN — Medication 16000 ML: at 11:59

## 2024-03-29 RX ADMIN — PROPOFOL 75 MCG/KG/MIN: 10 INJECTION, EMULSION INTRAVENOUS at 11:45

## 2024-03-29 RX ADMIN — PHENYLEPHRINE HYDROCHLORIDE 60 MCG/MIN: 10 INJECTION INTRAVENOUS at 11:51

## 2024-03-29 RX ADMIN — HYDRALAZINE HYDROCHLORIDE 50 MG: 50 TABLET ORAL at 15:43

## 2024-03-29 RX ADMIN — SODIUM CHLORIDE 7.5 MG/HR: 9 INJECTION, SOLUTION INTRAVENOUS at 23:32

## 2024-03-29 RX ADMIN — METOPROLOL SUCCINATE 50 MG: 50 TABLET, EXTENDED RELEASE ORAL at 09:11

## 2024-03-29 RX ADMIN — SODIUM CHLORIDE 5 MG/HR: 9 INJECTION, SOLUTION INTRAVENOUS at 16:34

## 2024-03-29 RX ADMIN — PROPOFOL 25 MG: 10 INJECTION, EMULSION INTRAVENOUS at 11:44

## 2024-03-29 ASSESSMENT — PAIN - FUNCTIONAL ASSESSMENT: PAIN_FUNCTIONAL_ASSESSMENT: NONE - DENIES PAIN

## 2024-03-29 ASSESSMENT — PAIN SCALES - GENERAL
PAINLEVEL_OUTOF10: 0

## 2024-03-29 NOTE — BRIEF OP NOTE
Brief Procedure Note      Patient: Yosef Ruvalcaba MRN: [id number]     YOB: 1935  Age: 88 y.o.  Sex: male      Service: Neurointerventional Surgery    Date of Procedure: 3/29/2024    Pre-Procedure Diagnosis: Symptomatic right carotid stenosis    Post-Procedure Diagnosis: SAME    : Lawrence Crawford MD    Assistant(s): N/A    Procedure(s):   Diagnostic cerebral angiogram  Right carotid angioplasty and stenting using distal embolic protection  Control angiography  Placement of arteriotomy closure device    Vessels Studied:   Right CCA    Puncture Site: Right femoral artery    Preliminary Findings:   Severe right carotid stenosis, s/p angioplasty and stenting, resulting in improved luminal caliber and antegrade flow in right ICA.    Plan:   Admit to ICU  SBP   Continue DAPT; check aspirin test and P2Y12 now  Q 1 hr neurochecks      Specimens: None    Implants: XACT stent 7-9 mm (right ICA), StarClose closure device (right CFA)    Drains: None    Anesthesia: MAC    Estimated Blood Loss: 50 cc      Apparent Intraoperative Complications: None immediate    Patient Condition: Stable    Disposition: ICU      Signed:   Lawrence Crawford MD    [facility] Neurointerventional Surgery  [facility]

## 2024-03-29 NOTE — OR NURSING
Pt moved to angio suite table with no assistance. Anesthesia at bedside to manage pt airway, pt medications, pt VS and pt status. Provider, anesthesia, IR staff in room for timeout, pt awake when possible and participating. Pt VSS.

## 2024-03-29 NOTE — CONSULTS
Requesting Provider: Maria Elena Raya MD              Patient: Yosef Ruvalcaba MRN: [id number]  SSN: [id number]    YOB: 1935  Age: 88 y.o.  Sex: male     Location: [facility]/       Code Status: Full Code   PCP: Filipe Johnston MD  - [phone]   Emergency Contact:  Primary Emergency Contact: Lilia Ruvalcaba, Home Phone: [phone]   Race/Hinduism/Language: Black /  / Spiritism / Speaks English   Payor: Payor: MEDICARE / Plan: MEDICARE PART A AND B / Product Type: *No Product type* /    Prior Admission Data: 2/28/23 [facility] EMERGENCY DEPT     Hospitalized:  Hospital Day: 7 - Admitted 3/23/2024  5:07 PM     CONSULTANTS  IP CONSULT TO TELE-NEUROLOGY  IP CONSULT TO NEUROLOGY  IP CONSULT TO NEUROINTERVENTIONAL SURGERY  IP CONSULT TO NEPHROLOGY  IP CONSULT TO UROLOGY   ADMISSION DIAGNOSES  [unfilled]      Assessment/Plan:       Urinary retention, Prostatomegaly   Gross hematuria 2/2 catheter trauma   ICA stenosis s/p stenting on AC     - Simple an placement. Unable to clear urine with manual irrigation. An replaced with 3 way catheter and cbi started. Continue CBI and manual irrigation as needed. Monitor H/H. Recommend short course of empiric antibiotics after multiple catheterizations. Continue home flomax or can use doxazosin for dual cardiac benefit. Determination of catheter plan pending course. We will re-evaluate tomorrow.       CC: [unfilled]   HPI: He is a 88 y.o. male w/ PMH of HTN, CAD s/p CABG, dyslipidemia, CKD stage III who presented with left arm weakness and numbness who is admitted for severe right ICA stenosis and CVA. He underwent cerebral angiogram, right carotid angioplasty and stenting today by Neurointerventional surgery and was transferred to the ICU post-procedure. Urology is consulted for retention and difficulty an placement. In the ICU he has been unable to void with elevated residual of ~360 cc. He complains of significant associated suprapubic pain with distension.

## 2024-03-29 NOTE — ANESTHESIA PRE PROCEDURE
Jose Enrique SANTORO MD       • 0.9 % sodium chloride infusion   IntraVENous PRN Jose Enrique Diez MD       • ondansetron (ZOFRAN-ODT) disintegrating tablet 4 mg  4 mg Oral Q8H PRN Jose Enrique Diez MD        Or   • ondansetron (ZOFRAN) injection 4 mg  4 mg IntraVENous Q6H PRN Jose Enrique Diez MD       • polyethylene glycol (GLYCOLAX) packet 17 g  17 g Oral Daily PRN Jose Enrique Diez MD       • enoxaparin (LOVENOX) injection 40 mg  40 mg SubCUTAneous Daily Jose Enrique Diez MD   40 mg at 03/29/24 0911   • aspirin tablet 325 mg  325 mg Oral Daily Kaz Awan APRN - NP   325 mg at 03/29/24 0911   • clopidogrel (PLAVIX) tablet 75 mg  75 mg Oral Daily Kaz Awan APRN - NP   75 mg at 03/29/24 0911   • atorvastatin (LIPITOR) tablet 40 mg  40 mg Oral Daily Maria Elena Costello MD   40 mg at 03/29/24 0912       Allergies:  No Known Allergies    Problem List:    Patient Active Problem List   Diagnosis Code   • Pure hypercholesterolemia E78.00   • Primary hypertension I10   • Left ventricular outflow tract obstruction Q24.8   • S/P CABG (coronary artery bypass graft) Z95.1   • CAD (coronary artery disease) I25.10   • Obesity (BMI 30.0-34.9) E66.9   • Left-sided weakness R53.1   • Acute CVA (cerebrovascular accident) ([facility]) I63.9   • Acute ischemic right MCA stroke ([facility]) I63.511   • Carotid stenosis, right I65.21   • Hyperlipidemia E78.5   • Prediabetes R73.03   • Left arm weakness R29.898   • Acute cerebrovascular accident (CVA) due to stenosis of right carotid artery ([facility]) I63.231   • Internal carotid artery stenosis, right I65.21       Past Medical History:        Diagnosis Date   • CAD (coronary artery disease)     CABG 2000, EF normal by echo 2001   • Hypertension    • 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

## 2024-03-29 NOTE — ANESTHESIA PROCEDURE NOTES
Arterial Line:    An arterial line was placed using surface landmarks, in the pre-op for the following indication(s): continuous blood pressure monitoring and blood sampling needed.    A 20 gauge (size) (length), Arrow (type) catheter was placed, Seldinger technique used, into the right radial artery, secured by Tegaderm.  Anesthesia type: Local  Local infiltration: Injection    Events:  patient tolerated procedure well with no complications.3/29/2024 11:20 AM3/29/2024 11:30 AM  Anesthesiologist: Maisha Andino DO  Performed: Anesthesiologist   Preanesthetic Checklist  Completed: patient identified, IV checked, site marked, risks and benefits discussed, surgical/procedural consents, equipment checked, pre-op evaluation, timeout performed, anesthesia consent given and oxygen available

## 2024-03-30 ENCOUNTER — APPOINTMENT (OUTPATIENT)
Facility: HOSPITAL | Age: 89
DRG: 035 | End: 2024-03-30
Payer: MEDICARE

## 2024-03-30 LAB
ANION GAP SERPL CALC-SCNC: 7 MMOL/L (ref 5–15)
BUN SERPL-MCNC: 16 MG/DL (ref 6–20)
BUN/CREAT SERPL: 14 (ref 12–20)
CALCIUM SERPL-MCNC: 8.7 MG/DL (ref 8.5–10.1)
CHLORIDE SERPL-SCNC: 112 MMOL/L (ref 97–108)
CO2 SERPL-SCNC: 18 MMOL/L (ref 21–32)
CREAT SERPL-MCNC: 1.11 MG/DL (ref 0.7–1.3)
ERYTHROCYTE [DISTWIDTH] IN BLOOD BY AUTOMATED COUNT: 13.5 % (ref 11.5–14.5)
GLUCOSE SERPL-MCNC: 94 MG/DL (ref 65–100)
HCT VFR BLD AUTO: 37.9 % (ref 36.6–50.3)
HGB BLD-MCNC: 12.2 G/DL (ref 12.1–17)
MAGNESIUM SERPL-MCNC: 1.5 MG/DL (ref 1.6–2.4)
MCH RBC QN AUTO: 29.6 PG (ref 26–34)
MCHC RBC AUTO-ENTMCNC: 32.2 G/DL (ref 30–36.5)
MCV RBC AUTO: 92 FL (ref 80–99)
NRBC # BLD: 0 K/UL (ref 0–0.01)
NRBC BLD-RTO: 0 PER 100 WBC
P2Y12 PLT RESPONSE: 225 PRU (ref 194–418)
P2Y12 PLT RESPONSE: 229 PRU (ref 194–418)
P2Y12 PLT RESPONSE: 55 PRU (ref 194–418)
PHOSPHATE SERPL-MCNC: 2.3 MG/DL (ref 2.6–4.7)
PLATELET # BLD AUTO: 144 K/UL (ref 150–400)
PMV BLD AUTO: 11.2 FL (ref 8.9–12.9)
POTASSIUM SERPL-SCNC: 4.1 MMOL/L (ref 3.5–5.1)
PSA SERPL-MCNC: 202 NG/ML (ref 0.01–4)
RBC # BLD AUTO: 4.12 M/UL (ref 4.1–5.7)
SODIUM SERPL-SCNC: 137 MMOL/L (ref 136–145)
WBC # BLD AUTO: 9.6 K/UL (ref 4.1–11.1)

## 2024-03-30 PROCEDURE — 84100 ASSAY OF PHOSPHORUS: CPT

## 2024-03-30 PROCEDURE — 2060000000 HC ICU INTERMEDIATE R&B

## 2024-03-30 PROCEDURE — 99232 SBSQ HOSP IP/OBS MODERATE 35: CPT | Performed by: NURSE PRACTITIONER

## 2024-03-30 PROCEDURE — 97535 SELF CARE MNGMENT TRAINING: CPT

## 2024-03-30 PROCEDURE — 36415 COLL VENOUS BLD VENIPUNCTURE: CPT

## 2024-03-30 PROCEDURE — 80048 BASIC METABOLIC PNL TOTAL CA: CPT

## 2024-03-30 PROCEDURE — 6370000000 HC RX 637 (ALT 250 FOR IP): Performed by: INTERNAL MEDICINE

## 2024-03-30 PROCEDURE — 51798 US URINE CAPACITY MEASURE: CPT

## 2024-03-30 PROCEDURE — 83735 ASSAY OF MAGNESIUM: CPT

## 2024-03-30 PROCEDURE — 2580000003 HC RX 258: Performed by: INTERNAL MEDICINE

## 2024-03-30 PROCEDURE — 97164 PT RE-EVAL EST PLAN CARE: CPT

## 2024-03-30 PROCEDURE — 6360000002 HC RX W HCPCS: Performed by: INTERNAL MEDICINE

## 2024-03-30 PROCEDURE — 6370000000 HC RX 637 (ALT 250 FOR IP): Performed by: PSYCHIATRY & NEUROLOGY

## 2024-03-30 PROCEDURE — 6370000000 HC RX 637 (ALT 250 FOR IP): Performed by: NURSE PRACTITIONER

## 2024-03-30 PROCEDURE — 97165 OT EVAL LOW COMPLEX 30 MIN: CPT

## 2024-03-30 PROCEDURE — 93882 EXTRACRANIAL UNI/LTD STUDY: CPT

## 2024-03-30 PROCEDURE — 85576 BLOOD PLATELET AGGREGATION: CPT

## 2024-03-30 PROCEDURE — 84153 ASSAY OF PSA TOTAL: CPT

## 2024-03-30 PROCEDURE — 99231 SBSQ HOSP IP/OBS SF/LOW 25: CPT | Performed by: NURSE PRACTITIONER

## 2024-03-30 PROCEDURE — 2580000003 HC RX 258: Performed by: NURSE PRACTITIONER

## 2024-03-30 PROCEDURE — 85027 COMPLETE CBC AUTOMATED: CPT

## 2024-03-30 PROCEDURE — 97530 THERAPEUTIC ACTIVITIES: CPT

## 2024-03-30 RX ORDER — ASPIRIN 81 MG/1
81 TABLET, CHEWABLE ORAL DAILY
Status: DISCONTINUED | OUTPATIENT
Start: 2024-03-31 | End: 2024-04-01 | Stop reason: HOSPADM

## 2024-03-30 RX ORDER — CLOPIDOGREL BISULFATE 75 MG/1
75 TABLET ORAL ONCE
Status: COMPLETED | OUTPATIENT
Start: 2024-03-30 | End: 2024-03-30

## 2024-03-30 RX ORDER — MAGNESIUM SULFATE IN WATER 40 MG/ML
2000 INJECTION, SOLUTION INTRAVENOUS ONCE
Status: COMPLETED | OUTPATIENT
Start: 2024-03-30 | End: 2024-03-30

## 2024-03-30 RX ADMIN — SODIUM CHLORIDE: 9 INJECTION, SOLUTION INTRAVENOUS at 10:26

## 2024-03-30 RX ADMIN — TAMSULOSIN HYDROCHLORIDE 0.4 MG: 0.4 CAPSULE ORAL at 07:57

## 2024-03-30 RX ADMIN — ATORVASTATIN CALCIUM 40 MG: 40 TABLET, FILM COATED ORAL at 07:57

## 2024-03-30 RX ADMIN — ASPIRIN 325 MG: 325 TABLET ORAL at 07:57

## 2024-03-30 RX ADMIN — HYDRALAZINE HYDROCHLORIDE 50 MG: 50 TABLET ORAL at 14:00

## 2024-03-30 RX ADMIN — SODIUM CHLORIDE: 9 INJECTION, SOLUTION INTRAVENOUS at 04:05

## 2024-03-30 RX ADMIN — AMLODIPINE BESYLATE 10 MG: 5 TABLET ORAL at 07:57

## 2024-03-30 RX ADMIN — HYDRALAZINE HYDROCHLORIDE 50 MG: 50 TABLET ORAL at 22:33

## 2024-03-30 RX ADMIN — SODIUM CHLORIDE, PRESERVATIVE FREE 10 ML: 5 INJECTION INTRAVENOUS at 07:57

## 2024-03-30 RX ADMIN — ENOXAPARIN SODIUM 40 MG: 100 INJECTION SUBCUTANEOUS at 07:57

## 2024-03-30 RX ADMIN — METOPROLOL SUCCINATE 50 MG: 50 TABLET, EXTENDED RELEASE ORAL at 07:57

## 2024-03-30 RX ADMIN — MAGNESIUM SULFATE HEPTAHYDRATE 2000 MG: 40 INJECTION, SOLUTION INTRAVENOUS at 10:28

## 2024-03-30 RX ADMIN — CLOPIDOGREL BISULFATE 75 MG: 75 TABLET ORAL at 08:22

## 2024-03-30 RX ADMIN — CLOPIDOGREL BISULFATE 75 MG: 75 TABLET ORAL at 07:57

## 2024-03-30 RX ADMIN — SODIUM CHLORIDE, PRESERVATIVE FREE 10 ML: 5 INJECTION INTRAVENOUS at 22:35

## 2024-03-30 RX ADMIN — HYDRALAZINE HYDROCHLORIDE 50 MG: 50 TABLET ORAL at 05:32

## 2024-03-30 RX ADMIN — TICAGRELOR 60 MG: 60 TABLET ORAL at 15:51

## 2024-03-30 ASSESSMENT — PAIN SCALES - GENERAL
PAINLEVEL_OUTOF10: 0

## 2024-03-30 NOTE — FLOWSHEET NOTE
0730 Received report on patient from BAIRON Parra. Assessment completed and documented.    0800 Urology at bedside, clamped CBI, Irrigated an with no blood clots. Stated to leave CBI clamped and irrigate an 3-4 times a shift and if urine becomes bloody/increased blood then restart CBI. No other new orders at this time.     1015 Manually irrigated an with 50mls sterile water.

## 2024-03-30 NOTE — CONSULTS
Vascular Surgery  Consult Note        Reason for Consult:  Diminished pulses     CHIEF COMPLAINT:  Stroke      HISTORY OF PRESENT ILLNESS:              The patient is a 88 y.o. male who presented with a right hemispheric stroke.  He underwent carotid artery stenting yesterday by Dr. Crawford.  Apparently post procedure there was poor doppler ultrasounds in the bilateral lower extremities. Bilateral doppler showed right CFA stenosis and a left popliteal artery occlusion.  Vascular is consulted. He denies any lower extremity pain and has full motor/sensation.  He reports getting out of bed some with no pain as well.     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 The ejection fraction was 50-60%    CORONARY ARTERY BYPASS GRAFT  2000    ECHO 2D ADULT  2001    LVH, normal LV wall motion and ejection fraction and no significant valvular pathology.     Current Medications:   Current Facility-Administered Medications: niCARdipine (CARDENE) 25 mg in sodium chloride 0.9 % 250 mL infusion ([id number]), 2.5-15 mg/hr, IntraVENous, Continuous  hydrALAZINE (APRESOLINE) tablet 50 mg, 50 mg, Oral, 3 times per day  melatonin tablet 3 mg, 3 mg, Oral, Nightly PRN  amLODIPine (NORVASC) tablet 10 mg, 10 mg, Oral, Daily  0.9 % sodium chloride infusion, , IntraVENous, Continuous  metoprolol succinate (TOPROL XL) extended release tablet 50 mg, 50 mg, Oral, Daily  tamsulosin (FLOMAX) capsule 0.4 mg, 0.4 mg, Oral, Daily  sodium chloride flush 0.9 % injection 5-40 mL, 5-40 mL, IntraVENous, 2 times per day  sodium chloride flush 0.9 % injection 5-40 mL, 5-40 mL, IntraVENous, PRN  0.9 % sodium chloride

## 2024-03-31 LAB
ECHO BSA: 2.04 M2
ERYTHROCYTE [DISTWIDTH] IN BLOOD BY AUTOMATED COUNT: 13.4 % (ref 11.5–14.5)
HCT VFR BLD AUTO: 37 % (ref 36.6–50.3)
HGB BLD-MCNC: 12.1 G/DL (ref 12.1–17)
MAGNESIUM SERPL-MCNC: 1.9 MG/DL (ref 1.6–2.4)
MCH RBC QN AUTO: 29.7 PG (ref 26–34)
MCHC RBC AUTO-ENTMCNC: 32.7 G/DL (ref 30–36.5)
MCV RBC AUTO: 90.9 FL (ref 80–99)
NRBC # BLD: 0 K/UL (ref 0–0.01)
NRBC BLD-RTO: 0 PER 100 WBC
P2Y12 PLT RESPONSE: 53 PRU (ref 194–418)
PHOSPHATE SERPL-MCNC: 2 MG/DL (ref 2.6–4.7)
PLATELET # BLD AUTO: 150 K/UL (ref 150–400)
PMV BLD AUTO: 11 FL (ref 8.9–12.9)
RBC # BLD AUTO: 4.07 M/UL (ref 4.1–5.7)
VAS RIGHT CCA DIST EDV: 12.9 CM/S
VAS RIGHT CCA DIST PSV: 134.1 CM/S
VAS RIGHT CCA PROX EDV: 7.3 CM/S
VAS RIGHT CCA PROX PSV: 134.2 CM/S
VAS RIGHT ECA EDV: 0 CM/S
VAS RIGHT ECA PSV: 183.9 CM/S
VAS RIGHT ICA DIST EDV: 9.7 CM/S
VAS RIGHT ICA DIST PSV: 66.2 CM/S
VAS RIGHT ICA MID EDV: 8.9 CM/S
VAS RIGHT ICA MID PSV: 67.4 CM/S
VAS RIGHT ICA PROX EDV: 5.3 CM/S
VAS RIGHT ICA PROX PSV: 98.4 CM/S
VAS RIGHT ICA/CCA PSV: 0.7 NO UNITS
VAS RIGHT VERTEBRAL EDV: 7.8 CM/S
VAS RIGHT VERTEBRAL PSV: 36.6 CM/S
WBC # BLD AUTO: 10.9 K/UL (ref 4.1–11.1)

## 2024-03-31 PROCEDURE — 84100 ASSAY OF PHOSPHORUS: CPT

## 2024-03-31 PROCEDURE — 6360000002 HC RX W HCPCS: Performed by: INTERNAL MEDICINE

## 2024-03-31 PROCEDURE — 6370000000 HC RX 637 (ALT 250 FOR IP): Performed by: NURSE PRACTITIONER

## 2024-03-31 PROCEDURE — 6370000000 HC RX 637 (ALT 250 FOR IP): Performed by: INTERNAL MEDICINE

## 2024-03-31 PROCEDURE — 85576 BLOOD PLATELET AGGREGATION: CPT

## 2024-03-31 PROCEDURE — 2060000000 HC ICU INTERMEDIATE R&B

## 2024-03-31 PROCEDURE — 83735 ASSAY OF MAGNESIUM: CPT

## 2024-03-31 PROCEDURE — 99232 SBSQ HOSP IP/OBS MODERATE 35: CPT | Performed by: NURSE PRACTITIONER

## 2024-03-31 PROCEDURE — 85027 COMPLETE CBC AUTOMATED: CPT

## 2024-03-31 PROCEDURE — 36415 COLL VENOUS BLD VENIPUNCTURE: CPT

## 2024-03-31 PROCEDURE — 6370000000 HC RX 637 (ALT 250 FOR IP): Performed by: PSYCHIATRY & NEUROLOGY

## 2024-03-31 PROCEDURE — APPNB45 APP NON BILLABLE 31-45 MINUTES

## 2024-03-31 RX ADMIN — Medication 3 MG: at 23:14

## 2024-03-31 RX ADMIN — TAMSULOSIN HYDROCHLORIDE 0.4 MG: 0.4 CAPSULE ORAL at 08:56

## 2024-03-31 RX ADMIN — ENOXAPARIN SODIUM 40 MG: 100 INJECTION SUBCUTANEOUS at 08:55

## 2024-03-31 RX ADMIN — TICAGRELOR 30 MG: 60 TABLET ORAL at 09:03

## 2024-03-31 RX ADMIN — HYDRALAZINE HYDROCHLORIDE 50 MG: 50 TABLET ORAL at 05:53

## 2024-03-31 RX ADMIN — ASPIRIN 81 MG: 81 TABLET, CHEWABLE ORAL at 08:56

## 2024-03-31 RX ADMIN — METOPROLOL SUCCINATE 50 MG: 50 TABLET, EXTENDED RELEASE ORAL at 08:56

## 2024-03-31 RX ADMIN — TICAGRELOR 30 MG: 60 TABLET ORAL at 22:38

## 2024-03-31 RX ADMIN — HYDRALAZINE HYDROCHLORIDE 50 MG: 50 TABLET ORAL at 14:36

## 2024-03-31 RX ADMIN — AMLODIPINE BESYLATE 10 MG: 5 TABLET ORAL at 08:55

## 2024-03-31 RX ADMIN — ATORVASTATIN CALCIUM 40 MG: 40 TABLET, FILM COATED ORAL at 08:56

## 2024-03-31 RX ADMIN — HYDRALAZINE HYDROCHLORIDE 50 MG: 50 TABLET ORAL at 22:38

## 2024-03-31 ASSESSMENT — PAIN SCALES - GENERAL
PAINLEVEL_OUTOF10: 0
PAINLEVEL_OUTOF10: 0

## 2024-03-31 NOTE — H&P
History and Physical    Date of Service:  3/24/2024  Primary Care Provider: Filipe Johnston MD  Source of information: The patient, patient's daughter at bedside and review of EMR    Chief Complaint: Numbness      History of Presenting Illness:   Yosef Ruvalcaba is a 88 y.o. male with past medical history significant for hypertension, CAD s/p CABG, dyslipidemia, presented at  the emergency room with left arm numbness and weakness.  Patient symptoms started at about 9 AM and progressively getting worse throughout the day.  Patient woke up with the symptoms and was perfectly  normal when he went to bed.  Patient denies difficulty with speech.  Patient has no prior history of for stroke/TIA.  He presented in the emergency room as code stroke level 2 alert.  CT of the head without contrast was negative.  CTA of the head and neck shows severe right ICA stenosis.  Patient was seen by teleneurologist at request of ED physician.  Admission to the hospital for stroke workup advised.  He was referred to the hospitalist service for that purpose.  Patient was also seen by neuro interventional surgery service NP.  Patient is outside TNK window and because of that this was not considered.  Patient was loaded with Plavix, he received aspirin prior to coming to the emergency room.  Patient was subsequently referred to the hospitalist service for admission       REVIEW OF SYSTEMS:  REVIEW OF SYSTEMS:  HEAD, EYES, EARS, NOSE AND THROAT:  No headache.  No dizziness.  No blurring of vision.  No photophobia.  RESPIRATORY SYSTEM: No shortness of breath.  No cough.  No hemoptysis.  CARDIOVASCULAR SYSTEM: No chest pain.  No orthopnea.  No palpitations.  GASTROINTESTINAL SYSTEM:  No nausea or vomiting.  No diarrhea.  No constipation.  GENITOURINARY SYSTEM:  No dysuria, no urgency, and no frequency.     All other systems are reviewed and they are negative.        Past Medical History:   Diagnosis Date    CAD (coronary artery disease)  
and social history were personally reviewed, all pertinent and relevant details are outlined as above.    Objective:   BP (!) 128/59   Pulse 95   Temp 98.3 °F (36.8 °C) (Oral)   Resp 20   Ht 1.702 m (5' 7.01\")   Wt 91.1 kg (200 lb 13.4 oz)   SpO2 96%   BMI 31.45 kg/m²         PHYSICAL EXAM:   General: Alert x oriented x 3, awake, no acute distress,   HEENT: PEERL, EOMI, moist mucus membranes  Neck: Supple, no JVD, no meningeal signs  Chest: Clear to auscultation bilaterally   CVS: RRR, S1 S2 heard, no murmurs/rubs/gallops  Abd: Soft, non-tender, non-distended, +bowel sounds   Ext: No clubbing, no cyanosis, no edema  Neuro/Psych: Pleasant mood and affect, CN 2-12 grossly intact, sensory grossly within normal limit, Strength 5/5 in all extremities  Cap refill: Brisk, less than 3 seconds  Pulses: 2+, symmetric in all extremities  Skin: Warm, dry, without rashes or lesions    Data Review:   I have independently reviewed and interpreted patient's lab and all other diagnostic data    Abnormal Labs Reviewed   COMPREHENSIVE METABOLIC PANEL - Abnormal; Notable for the following components:       Result Value    Chloride 109 (*)     Anion Gap 3 (*)     Glucose 112 (*)     Creatinine 1.58 (*)     Bun/Cre Ratio 8 (*)     Est, Glom Filt Rate 42 (*)     AST 14 (*)     Alk Phosphatase 142 (*)     Albumin 3.2 (*)     Albumin/Globulin Ratio 0.9 (*)     All other components within normal limits   CBC WITH AUTO DIFFERENTIAL - Abnormal; Notable for the following components:    Platelets 148 (*)     All other components within normal limits   COMPREHENSIVE METABOLIC PANEL - Abnormal; Notable for the following components:    Potassium 3.1 (*)     Chloride 114 (*)     Anion Gap 2 (*)     Bun/Cre Ratio 10 (*)     Calcium 7.1 (*)     AST 10 (*)     Alk Phosphatase 118 (*)     Total Protein 5.8 (*)     Albumin 2.7 (*)     Albumin/Globulin Ratio 0.9 (*)     All other components within normal limits   HEMOGLOBIN A1C - Abnormal; Notable

## 2024-04-01 ENCOUNTER — APPOINTMENT (OUTPATIENT)
Facility: HOSPITAL | Age: 89
DRG: 035 | End: 2024-04-01
Payer: MEDICARE

## 2024-04-01 VITALS
WEIGHT: 200.84 LBS | HEART RATE: 77 BPM | RESPIRATION RATE: 19 BRPM | HEIGHT: 67 IN | DIASTOLIC BLOOD PRESSURE: 62 MMHG | OXYGEN SATURATION: 99 % | SYSTOLIC BLOOD PRESSURE: 117 MMHG | TEMPERATURE: 97.4 F | BODY MASS INDEX: 31.52 KG/M2

## 2024-04-01 PROBLEM — I65.21 CAROTID STENOSIS, RIGHT: Status: ACTIVE | Noted: 2024-04-01

## 2024-04-01 LAB
ALBUMIN SERPL-MCNC: 2.9 G/DL (ref 3.5–5)
ALBUMIN/GLOB SERPL: 1 (ref 1.1–2.2)
ALP SERPL-CCNC: 115 U/L (ref 45–117)
ALT SERPL-CCNC: 17 U/L (ref 12–78)
ANION GAP SERPL CALC-SCNC: 6 MMOL/L (ref 5–15)
AST SERPL-CCNC: 20 U/L (ref 15–37)
BILIRUB SERPL-MCNC: 0.9 MG/DL (ref 0.2–1)
BUN SERPL-MCNC: 14 MG/DL (ref 6–20)
BUN/CREAT SERPL: 11 (ref 12–20)
CALCIUM SERPL-MCNC: 8.2 MG/DL (ref 8.5–10.1)
CHLORIDE SERPL-SCNC: 108 MMOL/L (ref 97–108)
CO2 SERPL-SCNC: 21 MMOL/L (ref 21–32)
CREAT SERPL-MCNC: 1.25 MG/DL (ref 0.7–1.3)
ERYTHROCYTE [DISTWIDTH] IN BLOOD BY AUTOMATED COUNT: 13.5 % (ref 11.5–14.5)
GLOBULIN SER CALC-MCNC: 2.9 G/DL (ref 2–4)
GLUCOSE BLD STRIP.AUTO-MCNC: 126 MG/DL (ref 65–117)
GLUCOSE SERPL-MCNC: 104 MG/DL (ref 65–100)
HCT VFR BLD AUTO: 37.6 % (ref 36.6–50.3)
HGB BLD-MCNC: 12.4 G/DL (ref 12.1–17)
MAGNESIUM SERPL-MCNC: 1.7 MG/DL (ref 1.6–2.4)
MCH RBC QN AUTO: 29.7 PG (ref 26–34)
MCHC RBC AUTO-ENTMCNC: 33 G/DL (ref 30–36.5)
MCV RBC AUTO: 90 FL (ref 80–99)
NRBC # BLD: 0 K/UL (ref 0–0.01)
NRBC BLD-RTO: 0 PER 100 WBC
PHOSPHATE SERPL-MCNC: 2.6 MG/DL (ref 2.6–4.7)
PLATELET # BLD AUTO: 148 K/UL (ref 150–400)
PMV BLD AUTO: 11 FL (ref 8.9–12.9)
POTASSIUM SERPL-SCNC: 3.5 MMOL/L (ref 3.5–5.1)
PROT SERPL-MCNC: 5.8 G/DL (ref 6.4–8.2)
RBC # BLD AUTO: 4.18 M/UL (ref 4.1–5.7)
SERVICE CMNT-IMP: ABNORMAL
SODIUM SERPL-SCNC: 135 MMOL/L (ref 136–145)
WBC # BLD AUTO: 9.7 K/UL (ref 4.1–11.1)

## 2024-04-01 PROCEDURE — 6360000002 HC RX W HCPCS: Performed by: INTERNAL MEDICINE

## 2024-04-01 PROCEDURE — 6370000000 HC RX 637 (ALT 250 FOR IP): Performed by: NURSE PRACTITIONER

## 2024-04-01 PROCEDURE — 82962 GLUCOSE BLOOD TEST: CPT

## 2024-04-01 PROCEDURE — 97116 GAIT TRAINING THERAPY: CPT

## 2024-04-01 PROCEDURE — 6370000000 HC RX 637 (ALT 250 FOR IP): Performed by: PSYCHIATRY & NEUROLOGY

## 2024-04-01 PROCEDURE — 36415 COLL VENOUS BLD VENIPUNCTURE: CPT

## 2024-04-01 PROCEDURE — 71046 X-RAY EXAM CHEST 2 VIEWS: CPT

## 2024-04-01 PROCEDURE — 99232 SBSQ HOSP IP/OBS MODERATE 35: CPT | Performed by: NURSE PRACTITIONER

## 2024-04-01 PROCEDURE — 6370000000 HC RX 637 (ALT 250 FOR IP): Performed by: INTERNAL MEDICINE

## 2024-04-01 PROCEDURE — 2580000003 HC RX 258: Performed by: INTERNAL MEDICINE

## 2024-04-01 PROCEDURE — 84100 ASSAY OF PHOSPHORUS: CPT

## 2024-04-01 PROCEDURE — 80053 COMPREHEN METABOLIC PANEL: CPT

## 2024-04-01 PROCEDURE — 97530 THERAPEUTIC ACTIVITIES: CPT

## 2024-04-01 PROCEDURE — 6360000004 HC RX CONTRAST MEDICATION: Performed by: RADIOLOGY

## 2024-04-01 PROCEDURE — 83735 ASSAY OF MAGNESIUM: CPT

## 2024-04-01 PROCEDURE — 74178 CT ABD&PLV WO CNTR FLWD CNTR: CPT

## 2024-04-01 PROCEDURE — 85027 COMPLETE CBC AUTOMATED: CPT

## 2024-04-01 RX ORDER — POTASSIUM CHLORIDE 750 MG/1
20 TABLET, FILM COATED, EXTENDED RELEASE ORAL ONCE
Status: COMPLETED | OUTPATIENT
Start: 2024-04-01 | End: 2024-04-01

## 2024-04-01 RX ORDER — FINASTERIDE 5 MG/1
5 TABLET, FILM COATED ORAL DAILY
Status: DISCONTINUED | OUTPATIENT
Start: 2024-04-01 | End: 2024-04-01 | Stop reason: HOSPADM

## 2024-04-01 RX ORDER — ASPIRIN 81 MG/1
81 TABLET, CHEWABLE ORAL DAILY
Qty: 30 TABLET | Refills: 3 | Status: SHIPPED | OUTPATIENT
Start: 2024-04-02

## 2024-04-01 RX ORDER — FINASTERIDE 5 MG/1
5 TABLET, FILM COATED ORAL DAILY
Qty: 30 TABLET | Refills: 3 | Status: SHIPPED | OUTPATIENT
Start: 2024-04-02

## 2024-04-01 RX ORDER — TAMSULOSIN HYDROCHLORIDE 0.4 MG/1
0.4 CAPSULE ORAL DAILY
Qty: 30 CAPSULE | Refills: 3 | Status: SHIPPED | OUTPATIENT
Start: 2024-04-02

## 2024-04-01 RX ORDER — AMLODIPINE BESYLATE 10 MG/1
10 TABLET ORAL DAILY
Qty: 30 TABLET | Refills: 3 | Status: SHIPPED | OUTPATIENT
Start: 2024-04-02

## 2024-04-01 RX ORDER — ATORVASTATIN CALCIUM 40 MG/1
40 TABLET, FILM COATED ORAL DAILY
Qty: 30 TABLET | Refills: 3 | Status: SHIPPED | OUTPATIENT
Start: 2024-04-02

## 2024-04-01 RX ADMIN — METOPROLOL SUCCINATE 50 MG: 50 TABLET, EXTENDED RELEASE ORAL at 10:16

## 2024-04-01 RX ADMIN — ATORVASTATIN CALCIUM 40 MG: 40 TABLET, FILM COATED ORAL at 10:15

## 2024-04-01 RX ADMIN — FINASTERIDE 5 MG: 5 TABLET, FILM COATED ORAL at 10:21

## 2024-04-01 RX ADMIN — TICAGRELOR 30 MG: 60 TABLET ORAL at 10:14

## 2024-04-01 RX ADMIN — ASPIRIN 81 MG: 81 TABLET, CHEWABLE ORAL at 10:17

## 2024-04-01 RX ADMIN — ENOXAPARIN SODIUM 40 MG: 100 INJECTION SUBCUTANEOUS at 10:21

## 2024-04-01 RX ADMIN — SODIUM CHLORIDE, PRESERVATIVE FREE 10 ML: 5 INJECTION INTRAVENOUS at 10:21

## 2024-04-01 RX ADMIN — IOPAMIDOL 100 ML: 755 INJECTION, SOLUTION INTRAVENOUS at 12:54

## 2024-04-01 RX ADMIN — TAMSULOSIN HYDROCHLORIDE 0.4 MG: 0.4 CAPSULE ORAL at 10:17

## 2024-04-01 RX ADMIN — POTASSIUM CHLORIDE 20 MEQ: 750 TABLET, EXTENDED RELEASE ORAL at 11:23

## 2024-04-01 ASSESSMENT — PAIN SCALES - GENERAL
PAINLEVEL_OUTOF10: 0
PAINLEVEL_OUTOF10: 0

## 2024-04-01 NOTE — PLAN OF CARE
Problem: Discharge Planning  Goal: Discharge to home or other facility with appropriate resources  3/24/2024 1653 by Saundra Sanchez RN  Outcome: Progressing  Flowsheets (Taken 3/24/2024 0800)  Discharge to home or other facility with appropriate resources: Identify barriers to discharge with patient and caregiver  3/24/2024 0327 by Marybeth Steiner RN  Outcome: Progressing  Flowsheets (Taken 3/24/2024 0044)  Discharge to home or other facility with appropriate resources:   Arrange for needed discharge resources and transportation as appropriate   Identify barriers to discharge with patient and caregiver   Identify discharge learning needs (meds, wound care, etc)   Refer to discharge planning if patient needs post-hospital services based on physician order or complex needs related to functional status, cognitive ability or social support system     Problem: Pain  Goal: Verbalizes/displays adequate comfort level or baseline comfort level  3/24/2024 1653 by Saundra Sanchez RN  Outcome: Progressing  3/24/2024 0327 by Marybeth Steiner RN  Outcome: Progressing     Problem: Chronic Conditions and Co-morbidities  Goal: Patient's chronic conditions and co-morbidity symptoms are monitored and maintained or improved  3/24/2024 1653 by Saundra Sanchez RN  Outcome: Progressing  Flowsheets (Taken 3/24/2024 0800)  Care Plan - Patient's Chronic Conditions and Co-Morbidity Symptoms are Monitored and Maintained or Improved: Monitor and assess patient's chronic conditions and comorbid symptoms for stability, deterioration, or improvement  3/24/2024 0327 by Marybeth Steiner RN  Outcome: Progressing     Problem: Safety - Adult  Goal: Free from fall injury  3/24/2024 1653 by Saundra Sanchez RN  Outcome: Progressing  Flowsheets (Taken 3/24/2024 0800)  Free From Fall Injury: Instruct family/caregiver on patient safety  3/24/2024 0327 by Marybeth Steiner RN  Outcome: Progressing     Problem: Confusion  Goal: Confusion, delirium, 
  Problem: Discharge Planning  Goal: Discharge to home or other facility with appropriate resources  3/24/2024 2325 by Griselda Lou, RN  Outcome: Progressing    Problem: Pain  Goal: Verbalizes/displays adequate comfort level or baseline comfort level  3/24/2024 2325 by Griselda Lou, RN  Outcome: Progressing    Problem: Chronic Conditions and Co-morbidities  Goal: Patient's chronic conditions and co-morbidity symptoms are monitored and maintained or improved  3/24/2024 2325 by Griselda Lou, RN  Outcome: Progressing    Problem: Safety - Adult  Goal: Free from fall injury  3/24/2024 2325 by Griselda Lou, RN  Outcome: Progressing  Free From Fall Injury: Instruct family/caregiver on patient safety           
  Problem: Discharge Planning  Goal: Discharge to home or other facility with appropriate resources  3/25/2024 1238 by Janeth Kiser RN  Outcome: Progressing  Flowsheets (Taken 3/25/2024 0800)  Discharge to home or other facility with appropriate resources: Identify barriers to discharge with patient and caregiver  3/24/2024 2325 by Griselda Lou, RN  Outcome: Progressing     Problem: Pain  Goal: Verbalizes/displays adequate comfort level or baseline comfort level  3/25/2024 1238 by Janeth Kiser RN  Outcome: Progressing  3/24/2024 2325 by Griselda Lou RN  Outcome: Progressing     Problem: Chronic Conditions and Co-morbidities  Goal: Patient's chronic conditions and co-morbidity symptoms are monitored and maintained or improved  Recent Flowsheet Documentation  Taken 3/25/2024 0800 by Janeth Kiser RN  Care Plan - Patient's Chronic Conditions and Co-Morbidity Symptoms are Monitored and Maintained or Improved: Monitor and assess patient's chronic conditions and comorbid symptoms for stability, deterioration, or improvement  3/24/2024 2325 by Griselda Lou, RN  Outcome: Progressing     Problem: Safety - Adult  Goal: Free from fall injury  3/25/2024 1238 by Janeth Kiser RN  Outcome: Progressing  Flowsheets (Taken 3/25/2024 0800)  Free From Fall Injury: Instruct family/caregiver on patient safety  3/24/2024 2325 by Griselda Lou RN  Outcome: Progressing     Problem: Confusion  Goal: Confusion, delirium, dementia, or psychosis is improved or at baseline  Description: INTERVENTIONS:  1. Assess for possible contributors to thought disturbance, including medications, impaired vision or hearing, underlying metabolic abnormalities, dehydration, psychiatric diagnoses, and notify attending LIP  2. [place] high risk fall precautions, as indicated  3. Provide frequent short contacts to provide reality reorientation, refocusing and direction  4. Decrease environmental stimuli, including noise as 
  Problem: Discharge Planning  Goal: Discharge to home or other facility with appropriate resources  3/27/2024 1715 by Severson, Constance M, RN  Outcome: Progressing  Flowsheets (Taken 3/27/2024 1557)  Discharge to home or other facility with appropriate resources: Identify barriers to discharge with patient and caregiver  3/27/2024 1056 by Julisa Morfin RN  Outcome: Progressing     Problem: Pain  Goal: Verbalizes/displays adequate comfort level or baseline comfort level  3/27/2024 1715 by Severson, Constance M, RN  Outcome: Progressing  3/27/2024 1056 by Julisa Morfin RN  Outcome: Progressing     Problem: Chronic Conditions and Co-morbidities  Goal: Patient's chronic conditions and co-morbidity symptoms are monitored and maintained or improved  3/27/2024 1715 by Severson, Constance M, RN  Outcome: Progressing  Flowsheets (Taken 3/27/2024 1557)  Care Plan - Patient's Chronic Conditions and Co-Morbidity Symptoms are Monitored and Maintained or Improved: Monitor and assess patient's chronic conditions and comorbid symptoms for stability, deterioration, or improvement  3/27/2024 1056 by Julisa Morfin RN  Outcome: Progressing     Problem: Safety - Adult  Goal: Free from fall injury  3/27/2024 1715 by Severson, Constance M, RN  Outcome: Progressing  Flowsheets (Taken 3/27/2024 1600)  Free From Fall Injury: Instruct family/caregiver on patient safety  3/27/2024 1056 by Julisa Morfin RN  Outcome: Progressing     Problem: Confusion  Goal: Confusion, delirium, dementia, or psychosis is improved or at baseline  Description: INTERVENTIONS:  1. Assess for possible contributors to thought disturbance, including medications, impaired vision or hearing, underlying metabolic abnormalities, dehydration, psychiatric diagnoses, and notify attending LIP  2. [place] high risk fall precautions, as indicated  3. Provide frequent short contacts to provide reality reorientation, refocusing and direction  4. Decrease 
  Problem: Discharge Planning  Goal: Discharge to home or other facility with appropriate resources  3/28/2024 0707 by Zayra Mosher RN  Outcome: Progressing  Flowsheets (Taken 3/28/2024 0000)  Discharge to home or other facility with appropriate resources: Identify barriers to discharge with patient and caregiver  3/27/2024 1715 by Severson, Constance M, RN  Outcome: Progressing  Flowsheets (Taken 3/27/2024 1557)  Discharge to home or other facility with appropriate resources: Identify barriers to discharge with patient and caregiver     Problem: Pain  Goal: Verbalizes/displays adequate comfort level or baseline comfort level  3/28/2024 0707 by Zayra Mosher RN  Outcome: Progressing  Flowsheets  Taken 3/27/2024 2157 by Severson, Constance M, RN  Verbalizes/displays adequate comfort level or baseline comfort level: Encourage patient to monitor pain and request assistance  Taken 3/27/2024 1722 by Severson, Constance M, RN  Verbalizes/displays adequate comfort level or baseline comfort level: Encourage patient to monitor pain and request assistance  3/27/2024 1715 by Severson, Constance M, RN  Outcome: Progressing     Problem: Chronic Conditions and Co-morbidities  Goal: Patient's chronic conditions and co-morbidity symptoms are monitored and maintained or improved  3/28/2024 0707 by Zayra Mosher RN  Outcome: Progressing  Flowsheets (Taken 3/28/2024 0000)  Care Plan - Patient's Chronic Conditions and Co-Morbidity Symptoms are Monitored and Maintained or Improved: Monitor and assess patient's chronic conditions and comorbid symptoms for stability, deterioration, or improvement  3/27/2024 1715 by Severson, Constance M, RN  Outcome: Progressing  Flowsheets (Taken 3/27/2024 1557)  Care Plan - Patient's Chronic Conditions and Co-Morbidity Symptoms are Monitored and Maintained or Improved: Monitor and assess patient's chronic conditions and comorbid symptoms for stability, deterioration, or improvement   
  Problem: Discharge Planning  Goal: Discharge to home or other facility with appropriate resources  3/29/2024 1040 by Alexandra Burnett RN  Outcome: Progressing  Flowsheets (Taken 3/29/2024 0800)  Discharge to home or other facility with appropriate resources:   Identify barriers to discharge with patient and caregiver   Arrange for needed discharge resources and transportation as appropriate   Identify discharge learning needs (meds, wound care, etc)  3/28/2024 2204 by Imelda Easley RN  Outcome: Progressing  Flowsheets (Taken 3/28/2024 2030)  Discharge to home or other facility with appropriate resources: Identify barriers to discharge with patient and caregiver     Problem: Pain  Goal: Verbalizes/displays adequate comfort level or baseline comfort level  3/29/2024 1040 by Alexandra Burnett RN  Outcome: Progressing  3/28/2024 2204 by Imelda Easley RN  Outcome: Progressing     Problem: Chronic Conditions and Co-morbidities  Goal: Patient's chronic conditions and co-morbidity symptoms are monitored and maintained or improved  3/29/2024 1040 by Alexandra Burnett RN  Outcome: Progressing  Flowsheets (Taken 3/29/2024 0800)  Care Plan - Patient's Chronic Conditions and Co-Morbidity Symptoms are Monitored and Maintained or Improved:   Monitor and assess patient's chronic conditions and comorbid symptoms for stability, deterioration, or improvement   Collaborate with multidisciplinary team to address chronic and comorbid conditions and prevent exacerbation or deterioration   Update acute care plan with appropriate goals if chronic or comorbid symptoms are exacerbated and prevent overall improvement and discharge  3/28/2024 2204 by Imelda Easley RN  Outcome: Progressing  Flowsheets (Taken 3/28/2024 2030)  Care Plan - Patient's Chronic Conditions and Co-Morbidity Symptoms are Monitored and Maintained or Improved: Monitor and assess patient's chronic conditions and comorbid symptoms for stability, 
  Problem: Discharge Planning  Goal: Discharge to home or other facility with appropriate resources  3/31/2024 1234 by Tanya Briceño RN  Outcome: Progressing  3/31/2024 0210 by Jina Leary RN  Outcome: Progressing  Flowsheets (Taken 3/30/2024 2115)  Discharge to home or other facility with appropriate resources: Identify barriers to discharge with patient and caregiver     Problem: Pain  Goal: Verbalizes/displays adequate comfort level or baseline comfort level  3/31/2024 1234 by Tanya Briceño RN  Outcome: Progressing  3/31/2024 0210 by Jina Leary RN  Outcome: Progressing     Problem: Chronic Conditions and Co-morbidities  Goal: Patient's chronic conditions and co-morbidity symptoms are monitored and maintained or improved  3/31/2024 1234 by Tanya Briceño RN  Outcome: Progressing  3/31/2024 0210 by Jina Leary RN  Outcome: Progressing  Flowsheets (Taken 3/30/2024 2115)  Care Plan - Patient's Chronic Conditions and Co-Morbidity Symptoms are Monitored and Maintained or Improved: Monitor and assess patient's chronic conditions and comorbid symptoms for stability, deterioration, or improvement     Problem: Safety - Adult  Goal: Free from fall injury  3/31/2024 1234 by Tanya Briceño RN  Outcome: Progressing  3/31/2024 0210 by Jina Leary RN  Outcome: Progressing     Problem: Confusion  Goal: Confusion, delirium, dementia, or psychosis is improved or at baseline  Description: INTERVENTIONS:  1. Assess for possible contributors to thought disturbance, including medications, impaired vision or hearing, underlying metabolic abnormalities, dehydration, psychiatric diagnoses, and notify attending LIP  2. [place] high risk fall precautions, as indicated  3. Provide frequent short contacts to provide reality reorientation, refocusing and direction  4. Decrease environmental stimuli, including noise as appropriate  5. Monitor and intervene to maintain adequate nutrition, hydration, elimination, 
  Problem: Discharge Planning  Goal: Discharge to home or other facility with appropriate resources  Outcome: Progressing     Problem: Pain  Goal: Verbalizes/displays adequate comfort level or baseline comfort level  Outcome: Progressing     Problem: Chronic Conditions and Co-morbidities  Goal: Patient's chronic conditions and co-morbidity symptoms are monitored and maintained or improved  Outcome: Progressing     Problem: Safety - Adult  Goal: Free from fall injury  Outcome: Progressing     Problem: Confusion  Goal: Confusion, delirium, dementia, or psychosis is improved or at baseline  Description: INTERVENTIONS:  1. Assess for possible contributors to thought disturbance, including medications, impaired vision or hearing, underlying metabolic abnormalities, dehydration, psychiatric diagnoses, and notify attending LIP  2. [place] high risk fall precautions, as indicated  3. Provide frequent short contacts to provide reality reorientation, refocusing and direction  4. Decrease environmental stimuli, including noise as appropriate  5. Monitor and intervene to maintain adequate nutrition, hydration, elimination, sleep and activity  6. If unable to ensure safety without constant attention obtain sitter and review sitter guidelines with assigned personnel  7. Initiate Psychosocial CNS and Spiritual Care consult, as indicated  Outcome: Progressing  Flowsheets (Taken 3/27/2024 9115)  Effect of thought disturbance (confusion, delirium, dementia, or psychosis) are managed with adequate functional status: Assess for contributors to thought disturbance, including medications, impaired vision or hearing, underlying metabolic abnormalities, dehydration, psychiatric diagnoses, notify LIP     
  Problem: Discharge Planning  Goal: Discharge to home or other facility with appropriate resources  Outcome: Progressing     Problem: Pain  Goal: Verbalizes/displays adequate comfort level or baseline comfort level  Outcome: Progressing     Problem: Chronic Conditions and Co-morbidities  Goal: Patient's chronic conditions and co-morbidity symptoms are monitored and maintained or improved  Outcome: Progressing     Problem: Safety - Adult  Goal: Free from fall injury  Outcome: Progressing     Problem: Confusion  Goal: Confusion, delirium, dementia, or psychosis is improved or at baseline  Description: INTERVENTIONS:  1. Assess for possible contributors to thought disturbance, including medications, impaired vision or hearing, underlying metabolic abnormalities, dehydration, psychiatric diagnoses, and notify attending LIP  2. [place] high risk fall precautions, as indicated  3. Provide frequent short contacts to provide reality reorientation, refocusing and direction  4. Decrease environmental stimuli, including noise as appropriate  5. Monitor and intervene to maintain adequate nutrition, hydration, elimination, sleep and activity  6. If unable to ensure safety without constant attention obtain sitter and review sitter guidelines with assigned personnel  7. Initiate Psychosocial CNS and Spiritual Care consult, as indicated  Outcome: Progressing     Problem: Occupational Therapy - Adult  Goal: By Discharge: Performs self-care activities at highest level of function for planned discharge setting.  See evaluation for individualized goals.  Description: FUNCTIONAL STATUS PRIOR TO ADMISSION:  patient lives with daughter and was IND with ADLs and mobility at baseline. No DME used. Patient was driving.     Occupational Therapy Goals:  Initiated 3/24/2024; Goals remain the same 3/30 reevaluation  1.  Patient will perform self-feeding using L hand for GM tasks with Set-up within 7 day(s).  2.  Patient will perform bathing 
  Problem: Discharge Planning  Goal: Discharge to home or other facility with appropriate resources  Outcome: Progressing  Flowsheets (Taken 3/24/2024 0044)  Discharge to home or other facility with appropriate resources:   Arrange for needed discharge resources and transportation as appropriate   Identify barriers to discharge with patient and caregiver   Identify discharge learning needs (meds, wound care, etc)   Refer to discharge planning if patient needs post-hospital services based on physician order or complex needs related to functional status, cognitive ability or social support system     Problem: Pain  Goal: Verbalizes/displays adequate comfort level or baseline comfort level  Outcome: Progressing     Problem: Chronic Conditions and Co-morbidities  Goal: Patient's chronic conditions and co-morbidity symptoms are monitored and maintained or improved  Outcome: Progressing     Problem: Safety - Adult  Goal: Free from fall injury  Outcome: Progressing     Problem: Confusion  Goal: Confusion, delirium, dementia, or psychosis is improved or at baseline  Description: INTERVENTIONS:  1. Assess for possible contributors to thought disturbance, including medications, impaired vision or hearing, underlying metabolic abnormalities, dehydration, psychiatric diagnoses, and notify attending LIP  2. [place] high risk fall precautions, as indicated  3. Provide frequent short contacts to provide reality reorientation, refocusing and direction  4. Decrease environmental stimuli, including noise as appropriate  5. Monitor and intervene to maintain adequate nutrition, hydration, elimination, sleep and activity  6. If unable to ensure safety without constant attention obtain sitter and review sitter guidelines with assigned personnel  7. Initiate Psychosocial CNS and Spiritual Care consult, as indicated  Outcome: Progressing     
  Problem: Discharge Planning  Goal: Discharge to home or other facility with appropriate resources  Outcome: Progressing  Flowsheets (Taken 3/26/2024 0800)  Discharge to home or other facility with appropriate resources: Identify barriers to discharge with patient and caregiver     Problem: Pain  Goal: Verbalizes/displays adequate comfort level or baseline comfort level  3/26/2024 0847 by Janeth Kiser RN  Outcome: Progressing  3/25/2024 2315 by Griselda Lou RN  Outcome: Progressing     Problem: Chronic Conditions and Co-morbidities  Goal: Patient's chronic conditions and co-morbidity symptoms are monitored and maintained or improved  3/26/2024 0847 by Janeth Kiser RN  Outcome: Progressing  Flowsheets (Taken 3/26/2024 0800)  Care Plan - Patient's Chronic Conditions and Co-Morbidity Symptoms are Monitored and Maintained or Improved: Monitor and assess patient's chronic conditions and comorbid symptoms for stability, deterioration, or improvement  3/25/2024 2315 by Griselda Lou RN  Outcome: Progressing     Problem: Safety - Adult  Goal: Free from fall injury  3/26/2024 0847 by Janeth Kiser RN  Outcome: Progressing  Flowsheets (Taken 3/26/2024 0800)  Free From Fall Injury: Instruct family/caregiver on patient safety  3/25/2024 2315 by Griselda Lou RN  Outcome: Progressing     Problem: Confusion  Goal: Confusion, delirium, dementia, or psychosis is improved or at baseline  Description: INTERVENTIONS:  1. Assess for possible contributors to thought disturbance, including medications, impaired vision or hearing, underlying metabolic abnormalities, dehydration, psychiatric diagnoses, and notify attending LIP  2. [place] high risk fall precautions, as indicated  3. Provide frequent short contacts to provide reality reorientation, refocusing and direction  4. Decrease environmental stimuli, including noise as appropriate  5. Monitor and intervene to maintain adequate nutrition, hydration, 
  Problem: Discharge Planning  Goal: Discharge to home or other facility with appropriate resources  Outcome: Progressing  Flowsheets (Taken 3/28/2024 2030)  Discharge to home or other facility with appropriate resources: Identify barriers to discharge with patient and caregiver     Problem: Pain  Goal: Verbalizes/displays adequate comfort level or baseline comfort level  Outcome: Progressing     Problem: Chronic Conditions and Co-morbidities  Goal: Patient's chronic conditions and co-morbidity symptoms are monitored and maintained or improved  Outcome: Progressing  Flowsheets (Taken 3/28/2024 2030)  Care Plan - Patient's Chronic Conditions and Co-Morbidity Symptoms are Monitored and Maintained or Improved: Monitor and assess patient's chronic conditions and comorbid symptoms for stability, deterioration, or improvement     Problem: Safety - Adult  Goal: Free from fall injury  Outcome: Progressing     Problem: Confusion  Goal: Confusion, delirium, dementia, or psychosis is improved or at baseline  Description: INTERVENTIONS:  1. Assess for possible contributors to thought disturbance, including medications, impaired vision or hearing, underlying metabolic abnormalities, dehydration, psychiatric diagnoses, and notify attending LIP  2. [place] high risk fall precautions, as indicated  3. Provide frequent short contacts to provide reality reorientation, refocusing and direction  4. Decrease environmental stimuli, including noise as appropriate  5. Monitor and intervene to maintain adequate nutrition, hydration, elimination, sleep and activity  6. If unable to ensure safety without constant attention obtain sitter and review sitter guidelines with assigned personnel  7. Initiate Psychosocial CNS and Spiritual Care consult, as indicated  Outcome: Progressing     Problem: Physical Therapy - Adult  Goal: By Discharge: Performs mobility at highest level of function for planned discharge setting.  See evaluation for 
  Problem: Discharge Planning  Goal: Discharge to home or other facility with appropriate resources  Outcome: Progressing  Flowsheets (Taken 4/1/2024 0800)  Discharge to home or other facility with appropriate resources: Identify barriers to discharge with patient and caregiver     Problem: Pain  Goal: Verbalizes/displays adequate comfort level or baseline comfort level  Outcome: Progressing     Problem: Chronic Conditions and Co-morbidities  Goal: Patient's chronic conditions and co-morbidity symptoms are monitored and maintained or improved  Outcome: Progressing  Flowsheets (Taken 4/1/2024 0800)  Care Plan - Patient's Chronic Conditions and Co-Morbidity Symptoms are Monitored and Maintained or Improved: Monitor and assess patient's chronic conditions and comorbid symptoms for stability, deterioration, or improvement     Problem: Safety - Adult  Goal: Free from fall injury  Outcome: Progressing  Flowsheets (Taken 4/1/2024 0800)  Free From Fall Injury: Instruct family/caregiver on patient safety     Problem: Confusion  Goal: Confusion, delirium, dementia, or psychosis is improved or at baseline  Description: INTERVENTIONS:  1. Assess for possible contributors to thought disturbance, including medications, impaired vision or hearing, underlying metabolic abnormalities, dehydration, psychiatric diagnoses, and notify attending LIP  2. [place] high risk fall precautions, as indicated  3. Provide frequent short contacts to provide reality reorientation, refocusing and direction  4. Decrease environmental stimuli, including noise as appropriate  5. Monitor and intervene to maintain adequate nutrition, hydration, elimination, sleep and activity  6. If unable to ensure safety without constant attention obtain sitter and review sitter guidelines with assigned personnel  7. Initiate Psychosocial CNS and Spiritual Care consult, as indicated  Outcome: Progressing  Flowsheets (Taken 4/1/2024 0800)  Effect of thought 
  Problem: Occupational Therapy - Adult  Goal: By Discharge: Performs self-care activities at highest level of function for planned discharge setting.  See evaluation for individualized goals.  Description: FUNCTIONAL STATUS PRIOR TO ADMISSION:  patient lives with daughter and was IND with ADLs and mobility at baseline. No DME used. Patient was driving.     Occupational Therapy Goals:  Initiated 3/24/2024; Goals remain the same 3/30 reevaluation  1.  Patient will perform self-feeding using L hand for GM tasks with Set-up within 7 day(s).  2.  Patient will perform bathing using L hand for GM tasks with Supervision within 7 day(s).  3.  Patient will perform upper body dressing using L hand for GM tasks with Supervision within 7 day(s).  4.  Patient will perform toilet transfers with Supervision  within 7 day(s).  5.  Patient will perform all aspects of toileting using L hand for roberto care with Supervision within 7 day(s).  6.  Patient will participate in upper extremity therapeutic exercise/activities with Supervision for 5 minutes within 7 day(s).    7.  Patient will utilize energy conservation techniques during functional activities with verbal cues within 7 day(s).  8.  Patient will improve Fugl Espitia score by 2 points within 7 days.   3/30/2024 1411 by Jonna Henry, OT  Outcome: Progressing  3/30/2024 1411 by Jonna Henry, NIKOLAS  Outcome: Progressing   OCCUPATIONAL THERAPY RE-EVALUATION  Patient: Yosef Ruvalcaba (88 y.o. male)  Date: 3/30/2024  Primary Diagnosis: Left-sided weakness [R53.1]  Left arm weakness [R29.898]  Acute CVA (cerebrovascular accident) (HCC) [I63.9]         Precautions:                  Chart, occupational therapy assessment, plan of care, and goals were reviewed.    ASSESSMENT  The patient is limited by decreased functional mobility, independence in ADLs, high-level IADLs, strength, coordination, balance s/p cerebral angiogram and right ICA angioplasty and stenting 3/29/24 following acute right 
  Problem: Physical Therapy - Adult  Goal: By Discharge: Performs mobility at highest level of function for planned discharge setting.  See evaluation for individualized goals.  Description: FUNCTIONAL STATUS PRIOR TO ADMISSION: Patient was independent and active without use of DME.    HOME SUPPORT PRIOR TO ADMISSION: The patient lived with his daughter but did not require assistance.    Physical Therapy Goals  Initiated 3/24/2024  1.  Patient will move from supine to sit and sit to supine and scoot up and down in bed with independence within 7 day(s).    2.  Patient will perform sit to stand with independence within 7 day(s).  3.  Patient will transfer from bed to chair and chair to bed with independence using the least restrictive device within 7 day(s).  4.  Patient will ambulate with supervision/set-up for 300 feet with the least restrictive device within 7 day(s).   5.  Patient will ascend/descend 5 stairs with handrail(s) with supervision/set-up within 7 day(s).  6.  Patient will improve Carey Balance score by 7 points within 7 days.    Outcome: Progressing     PHYSICAL THERAPY EVALUATION    Patient: Yosef Ruvalcaba (88 y.o. male)  Date: 3/24/2024  Primary Diagnosis: Left-sided weakness [R53.1]  Left arm weakness [R29.898]       Precautions:                        ASSESSMENT :   DEFICITS/IMPAIRMENTS:   The patient presents with impaired functional mobility as compared to baseline level 2* L UE weakness, impaired L UE coordination, mild L inattention to self, impaired balance, and impaired gait following admission for CVA work up. Head CT negative, MRI pending. Note severe R ICA stenosis. At baseline, he lives at home with his daughter and is indep with ADLs/mobility without AD.    Received pt up on BSC, cleared for mobility by RN. He was able to complete multiple sit<>stands and transfers from bed<>BSC with up to CGA for safety plus cues for L UE placement. Demos good/fair standing balance while a completing 
  Problem: Physical Therapy - Adult  Goal: By Discharge: Performs mobility at highest level of function for planned discharge setting.  See evaluation for individualized goals.  Description: FUNCTIONAL STATUS PRIOR TO ADMISSION: Patient was independent and active without use of DME.    HOME SUPPORT PRIOR TO ADMISSION: The patient lived with his daughter but did not require assistance.    Physical Therapy Goals  Initiated 3/24/2024  1.  Patient will move from supine to sit and sit to supine and scoot up and down in bed with independence within 7 day(s).    2.  Patient will perform sit to stand with independence within 7 day(s).  3.  Patient will transfer from bed to chair and chair to bed with independence using the least restrictive device within 7 day(s).  4.  Patient will ambulate with supervision/set-up for 300 feet with the least restrictive device within 7 day(s).   5.  Patient will ascend/descend 5 stairs with handrail(s) with supervision/set-up within 7 day(s).  6.  Patient will improve Carey Balance score by 7 points within 7 days.    Outcome: Progressing     PHYSICAL THERAPY TREATMENT    Patient: Yosfe Ruvalcaba (88 y.o. male)  Date: 3/27/2024  Diagnosis: Left-sided weakness [R53.1]  Left arm weakness [R29.898]  Acute CVA (cerebrovascular accident) (HCC) [I63.9] Acute CVA (cerebrovascular accident) (formerly [facility])      Precautions:                        ASSESSMENT:  Patient continues to benefit from skilled PT services and is progressing towards goals however remains most limited by L UE weakness, L inattention to self and environment, impaired balance, and impaired gait leading to increased falls risk and dependency from baseline level. Note L ICA angio and possible stent procedure has been postponed until later this week.    Received pt supine in bed, cleared for mobility by RN. Note drop in SBP throughout course of session, initially in the upper 130s, dropping to 118. Pt denies dizziness. Gait training progressed 
  Problem: Physical Therapy - Adult  Goal: By Discharge: Performs mobility at highest level of function for planned discharge setting.  See evaluation for individualized goals.  Description: FUNCTIONAL STATUS PRIOR TO ADMISSION: Patient was independent and active without use of DME.    HOME SUPPORT PRIOR TO ADMISSION: The patient lived with his daughter but did not require assistance.    Physical Therapy Goals  Initiated 3/24/2024  1.  Patient will move from supine to sit and sit to supine and scoot up and down in bed with independence within 7 day(s).    2.  Patient will perform sit to stand with independence within 7 day(s).  3.  Patient will transfer from bed to chair and chair to bed with independence using the least restrictive device within 7 day(s).  4.  Patient will ambulate with supervision/set-up for 300 feet with the least restrictive device within 7 day(s).   5.  Patient will ascend/descend 5 stairs with handrail(s) with supervision/set-up within 7 day(s).  6.  Patient will improve Carey Balance score by 7 points within 7 days.  Outcome: Progressing  PHYSICAL THERAPY TREATMENT    Patient: Yosef Ruvalcaba (88 y.o. male)  Date: 3/28/2024  Diagnosis: Left-sided weakness [R53.1]  Left arm weakness [R29.898]  Acute CVA (cerebrovascular accident) (HCC) [I63.9] Acute CVA (cerebrovascular accident) ([facility])      Precautions:                        ASSESSMENT:  Patient continues to benefit from skilled PT services and is progressing towards goals. Presents with L sided weakness, impaired balance, slow/shuffled gait, decreased activity tolerance, flat affect, and overall decline in functional mobility.  Pt agreeable to participate with therapy.  Received with daughter at bedside performing PROM to patient.  Educated on recommendation for AROM as able first by patient and he was able to perform return demonstration with cuing for slow and controlled movement.  Transferred supine<>sit with SBA, sit<>stand with CGA, and 
  Problem: Physical Therapy - Adult  Goal: By Discharge: Performs mobility at highest level of function for planned discharge setting.  See evaluation for individualized goals.  Description: FUNCTIONAL STATUS PRIOR TO ADMISSION: Patient was independent and active without use of DME.    HOME SUPPORT PRIOR TO ADMISSION: The patient lived with his daughter but did not require assistance.    Physical Therapy Goals  Reassessment completed after R ICA stent placed, POD1    Initiated 3/24/2024  1.  Patient will move from supine to sit and sit to supine and scoot up and down in bed with independence within 7 day(s).    2.  Patient will perform sit to stand with independence within 7 day(s).  3.  Patient will transfer from bed to chair and chair to bed with independence using the least restrictive device within 7 day(s).  4.  Patient will ambulate with supervision/set-up for 300 feet with the least restrictive device within 7 day(s).   5.  Patient will ascend/descend 5 stairs with handrail(s) with supervision/set-up within 7 day(s).  6.  Patient will improve Carey Balance score by 7 points within 7 days.    Outcome: Progressing     PHYSICAL THERAPY TREATMENT    Patient: Yosef Ruvalcaba (88 y.o. male)  Date: 4/1/2024  Diagnosis: Left-sided weakness [R53.1]  Left arm weakness [R29.898]  Acute CVA (cerebrovascular accident) (HCC) [I63.9] Acute CVA (cerebrovascular accident) (HCC)      Precautions:                        ASSESSMENT:  Patient continues to benefit from skilled PT services and is progressing towards goals however remains most limited by L UE weakness, impaired coordination, mild L inattention, impaired balance, and impaired gait leading to increased falls risk and dependency from baseline level.    Received pt up in bathroom, agreeable to participation in session. Gait training progressed in hallway for 300ft + additional 150ft without AD; demos slowed, shuffled pace with altered L arm swing; no major LOB with 
  Problem: Physical Therapy - Adult  Goal: By Discharge: Performs mobility at highest level of function for planned discharge setting.  See evaluation for individualized goals.  Description: FUNCTIONAL STATUS PRIOR TO ADMISSION: Patient was independent and active without use of DME.    HOME SUPPORT PRIOR TO ADMISSION: The patient lived with his daughter but did not require assistance.    Physical Therapy Goals  Reassessment completed after R ICA stent placed, POD1    Initiated 3/24/2024  1.  Patient will move from supine to sit and sit to supine and scoot up and down in bed with independence within 7 day(s).    2.  Patient will perform sit to stand with independence within 7 day(s).  3.  Patient will transfer from bed to chair and chair to bed with independence using the least restrictive device within 7 day(s).  4.  Patient will ambulate with supervision/set-up for 300 feet with the least restrictive device within 7 day(s).   5.  Patient will ascend/descend 5 stairs with handrail(s) with supervision/set-up within 7 day(s).  6.  Patient will improve Carey Balance score by 7 points within 7 days.    Outcome: Progressing   PHYSICAL THERAPY RE-EVALUATION    Patient: Yosef Ruvalcaba (88 y.o. male)  Date: 3/30/2024  Primary Diagnosis: Left-sided weakness [R53.1]  Left arm weakness [R29.898]  Acute CVA (cerebrovascular accident) (HCC) [I63.9]       Precautions:                      ASSESSMENT :  DEFICITS/IMPAIRMENTS:   The patient is limited by decreased  mobility after R ICA stenting and now with CBI after difficult an insertion due to retention and decreased pulses in LE s.  He has been seen by vascular with no change in current medical plan.  He was eager to get out of the bed and reports his legs feel cold, but otherwise no complaints.  He needed contact guard assist for standing and taking steps to the chair, with good overall control of balance without hand on a support.  VSS with position changes and no complaints 
Patient not complaining of pain. CBI continues. Vascular surgery evaluating patient. Using dopplers for pulses. No pedal/tibial pulses.   
Problem: Pain  Goal: Verbalizes/displays adequate comfort level or baseline comfort level  3/25/2024 2315 by Griselda Lou, RN  Outcome: Progressing     Problem: Chronic Conditions and Co-morbidities  Goal: Patient's chronic conditions and co-morbidity symptoms are monitored and maintained or improved  Outcome: Progressing     Problem: Safety - Adult  Goal: Free from fall injury  3/25/2024 2315 by Griselda Lou, RN  Outcome: Progressing    Problem: Confusion  Goal: Confusion, delirium, dementia, or psychosis is improved or at baseline  Description: INTERVENTIONS:  1. Assess for possible contributors to thought disturbance, including medications, impaired vision or hearing, underlying metabolic abnormalities, dehydration, psychiatric diagnoses, and notify attending LIP  2. [place] high risk fall precautions, as indicated  3. Provide frequent short contacts to provide reality reorientation, refocusing and direction  4. Decrease environmental stimuli, including noise as appropriate  5. Monitor and intervene to maintain adequate nutrition, hydration, elimination, sleep and activity  6. If unable to ensure safety without constant attention obtain sitter and review sitter guidelines with assigned personnel  7. Initiate Psychosocial CNS and Spiritual Care consult, as indicated  3/25/2024 2315 by Griselda Lou, RN  Outcome: Progressing       
strengthening. Family at bedside, both parties educated on use/purpose of theraputty and stopping if pain occurs. Continue to recommend OP OT at DC to further address LUE deficits.          PLAN :  Patient continues to benefit from skilled intervention to address the above impairments.  Continue treatment per established plan of care to address goals.    Recommend with staff: Amy BR    Recommend next OT session: standing grooming at sink, LB ADLs    Recommendation for discharge: (in order for the patient to meet his/her long term goals): Outpatient occupational therapy for LUE deficits (strength, fine motor deficits, coordination)    Other factors to consider for discharge: high risk for falls    IF patient discharges home will need the following DME: shower chair       SUBJECTIVE:   Patient stated “I got up and went to the bathroom this morning.”    OBJECTIVE DATA SUMMARY:   Cognitive/Behavioral Status:   AOx4       Functional Mobility and Transfers for ADLs:  Bed Mobility:  Bed Mobility Training  Rolling: Supervision  Supine to Sit: Supervision  Scooting: Supervision     Transfers:   Transfer Training  Sit to Stand: Supervision  Stand to Sit: Supervision  Stand Pivot Transfers: Supervision  Bed to Chair: Supervision           Balance:  Standing: Impaired  Balance  Sitting: Intact  Standing: Impaired  Standing - Static: Good  Standing - Dynamic: Good      ADL Intervention:  Patient continues to require up to min A for ADLs limited by decreased LUE strength & Fine motor coordination    Pain Rating:  Patient did not endorse pain during session      Activity Tolerance:   Good and requires rest breaks  Please refer to the flowsheet for vital signs taken during this treatment.    After treatment:   Patient left in no apparent distress sitting up in chair, Bed/ chair alarm activated, and Caregiver / family present    COMMUNICATION/EDUCATION:   The patient's plan of care was discussed with: physical therapist, 
Home: House  Home Layout: One level  Home Access: Stairs to enter with rails  Entrance Stairs - Number of Steps: 4  Entrance Stairs - Rails: Left  Bathroom Shower/Tub: Tub/Shower unit  Home Equipment: None  Has the patient had two or more falls in the past year or any fall with injury in the past year?: No  ADL Assistance: Independent  Homemaking Assistance: Independent  Homemaking Responsibilities: Yes  Ambulation Assistance: Independent  Transfer Assistance: Independent  Active : Yes  Mode of Transportation: Car  Occupation: Retired    Hand Dominance: left     EXAMINATION OF PERFORMANCE DEFICITS:    Cognitive/Behavioral Status:  Orientation  Overall Orientation Status: Within Functional Limits  Orientation Level: Oriented X4  Cognition  Overall Cognitive Status: WFL    Skin: appears intact    Edema: none noted in BUEs    Hearing:        Vision/Perceptual:    Vision - Basic Assessment  Prior Vision: No visual deficits        Perception  Overall Perceptual Status: WFL    Range of Motion:   AROM: Within functional limits  PROM: Within functional limits    Strength:  Strength: Within functional limits    Coordination:  Coordination: Generally decreased, functional (L hand)    Tone & Sensation:   Tone: Normal  Sensation: Intact    Functional Mobility and Transfers for ADLs:    Bed Mobility:     Bed Mobility Training  Bed Mobility Training: Yes  Sit to Supine: Supervision    Transfers:      Transfer Training  Transfer Training: Yes  Sit to Stand: Stand-by assistance  Stand to Sit: Stand-by assistance  Toilet Transfer: Stand-by assistance       Balance:   Standing: Impaired  Balance  Sitting: Intact  Standing: Impaired  Standing - Static: Good  Standing - Dynamic: Good    ADL Assessment:     Feeding: Minimal assistance  Feeding Skilled Clinical Factors: Infer - particularly if using L hand for FM tasks    Grooming: Minimal assistance  Grooming Skilled Clinical Factors: Infer - particularly if using L hand for FM 
14 (6.5) months post stroke, Chronic Stroke)   FMA = 5.2 points for the Upper Extremity portion of the assessment       Pain Ratin/10   Pain Intervention(s):   nursing notified, rest, and repositioning      Activity Tolerance:   Good  Please refer to the flowsheet for vital signs taken during this treatment.    After treatment:   Patient left in no apparent distress in bed, Call bell within reach, Bed/ chair alarm activated, and Caregiver / family present    COMMUNICATION/EDUCATION:   The patient's plan of care was discussed with: physical therapist, registered nurse, and     Patient Education  Education Given To: Patient  Education Provided: Role of Therapy;Plan of Care;ADL Adaptive Strategies;Fall Prevention Strategies;Home Exercise Program;Precautions;Transfer Training;IADL Safety;Low Vision Education  Education Method: Verbal;Demonstration;Teach Back  Barriers to Learning: Cognition;Vision  Education Outcome: Verbalized understanding;Continued education needed    Thank you for this referral.  ERIKA Arroyo, OTR/L  Minutes: 32

## 2024-04-01 NOTE — CARE COORDINATION
Transition of Care Plan:  Home with daughter and outpatient PT/OT pet Pt choice; pt will need script for this from Attending prior to d/c    CM reviewed IM letter and rights with Pt and granddau and provided copy of letter.     Granddau will transport home today 4/1  ---    RUR: 15%  Prior Level of Functioning: Pt needed assistance with adls and iadls  Disposition: Home with dtr and OP therapy  Follow up appointments: PCP & Specialist  DME needed: none  Transportation at discharge: granddaughter  IM/IMM Medicare/ letter given: will need 2nd IMM letter prior to d/c, 2nd letter given 4/1  Caregiver Contact: Lilia Ruvalcaba [phone]   Discharge Caregiver contacted prior to discharge? yes  Care Conference needed? no  Barriers to discharge: none

## 2024-04-01 NOTE — DISCHARGE SUMMARY
Discharge Summary       PATIENT ID: Yosef Ruvalcaba  MRN: [id number]   YOB: 1935    DATE OF ADMISSION: 3/23/2024  5:07 PM    DATE OF DISCHARGE: 4/1/24   PRIMARY CARE PROVIDER: Filipe Johnston MD     ATTENDING PHYSICIAN: lana ervin  DISCHARGING PROVIDER: Lana Ervin MD    To contact this individual call [phone] and ask the  to page.  If unavailable ask to be transferred the Adult Hospitalist Department.    CONSULTATIONS: IP CONSULT TO TELE-NEUROLOGY  IP CONSULT TO NEUROLOGY  IP CONSULT TO NEUROINTERVENTIONAL SURGERY  IP CONSULT TO NEPHROLOGY  IP CONSULT TO UROLOGY  IP CONSULT TO VASCULAR SURGERY    PROCEDURES/SURGERIES: * No surgery found *    ADMITTING DIAGNOSES & HOSPITAL COURSE:     Yosef Ruvalcaba is a 88 y.o. male with apmhx CAD s/p CABG, HTN, dyslipidemia, and LVOT obstruction who presented to the ED on 3/24  with left arm numbness and weakness, and was admitted for stroke, and severe right ICA stenosis.  He underwent R ICA angioplasty and stenting with distal embolic protetion on 3/29 by NIS, and was admitted to the ICU for monitoring post procedure.  Hospital course was complicated by enlarged prostate and elevated PSA requiring chronic indwelling an, and urology consultation.     #severe R ICA stenosis s/p stent on 3/29  #R MCA stroke  -strict BP control for goal SBP <120  -continue metoprolol   -DAPT (ASA and brilinta+lipitor)  -NIS following and cleared for d/c     #prostatomegaly  #elevated PSA  -continue chronic an, and flomax  -urology following  - cont Flomax and finasteride , PSA high out pt f/u as no biopsy possible as ASA and brilinta can't be stopped    - an cath if unable to void and than out pt voiding trial     #CKD 3a (b/l creatinine 1.3-1.4)  -avoid renal toxins       DISCHARGE DIAGNOSES / PLAN:       D/c home       PENDING TEST RESULTS:   At the time of discharge the following test results are still pending:     FOLLOW UP APPOINTMENTS:

## 2024-04-01 NOTE — PROGRESS NOTES
Neurology Progress Note     Addendum (3/25/24 2628): TTE reviewed EF 65-70%, severe concentric hypertrophy suggestive of HCM. Further management per primary team. No change to previous plan.       NAME: Yosef Ruvalcaba   :  1935   MRN:  [id number]   DATE:  3/25/2024    Assessment:     Principal Problem:    Acute CVA (cerebrovascular accident) (HCC)  Active Problems:    Primary hypertension    Left-sided weakness    Acute ischemic right MCA stroke (HCC)    Carotid stenosis, right    Hyperlipidemia    Prediabetes    Left arm weakness  Resolved Problems:    * No resolved hospital problems. *    Patient is an 88 year-old L-handed male with history of HTN and CAD s/p CABG who was admitted on 3/23/24 with L hand weakness that began around 0900. CT head negative. CTA head/neck with severe R ICA stenosis, no LVO, and no significant intracranial atherosclerosis. MRI brain showed multiple acute R MCA infarcts. EKG NSR. LDL 53.6, HgbA1c 5.8. He is on Zocor 40 mg and aspirin 81 mg at baseline.     Likely vessel to vessel thromboembolic due to R ICA stenosis  Plan:   - Plan for cerebral angiogram with possible stent today   - NIS following patient and APTs-> continue aspirin 325 mg daily and Plavix 75 mg daily. P2Y12 209 last evening and additional load Plavix 150 mg given. P2Y12 165 this am.   - Continue atorvastatin 40 mg daily  - SBP goal per NIS following procedure  - TTE pending   - A1c 5.8, management of prediabetes per primary team  - PT/OT    Will f/u with patient after angio. Possible study candidate for [facility]-Stroke study.   Subjective:   Patient verbalized understanding of above plan.     Objective:   Chart reviewed since last seen    Current Facility-Administered Medications   Medication Dose Route Frequency    0.9 % sodium chloride infusion   IntraVENous 
                                                                                                                                                   Neurology Progress Note     Addendum (3/25/24 5675): TTE reviewed EF 65-70%, severe concentric hypertrophy suggestive of HCM. Further management per primary team. No change to previous plan.       NAME: Yosef Ruvalcaba   :  1935   MRN:  [id number]   DATE:  3/26/2024    Assessment:     Principal Problem:    Acute CVA (cerebrovascular accident) (HCC)  Active Problems:    Primary hypertension    Left-sided weakness    Acute ischemic right MCA stroke (HCC)    Carotid stenosis, right    Hyperlipidemia    Prediabetes    Left arm weakness    Acute cerebrovascular accident (CVA) due to stenosis of right carotid artery (HCC)    Internal carotid artery stenosis, right  Resolved Problems:    * No resolved hospital problems. *    Patient is an 88 year-old L-handed male with history of HTN and CAD s/p CABG who was admitted on 3/23/24 with L hand weakness that began around 0900. CT head negative. CTA head/neck with severe R ICA stenosis, no LVO, and no significant intracranial atherosclerosis. MRI brain showed multiple acute R MCA infarcts. EKG NSR. LDL 53.6, HgbA1c 5.8, Cr 1.58- 1.47 today. He is on Zocor 40 mg and aspirin 81 mg at baseline.   TTE EF 65-70%, HCM    Likely vessel to vessel thromboembolic due to R ICA stenosis  Plan:   - Plan for cerebral angiogram with possible stent. Procedure cancelled yesterday and today due to elevated Cr. Nephrology following.   - NIS following patient and APTs-> continue aspirin 325 mg daily and Plavix 75 mg daily. P2Y12 209 last evening and additional load Plavix 150 mg given. Repeat P2Y12 165 and 146 this am.   - Continue atorvastatin 40 mg daily  - SBP goal per NIS following procedure  - TTE as above  - A1c 5.8, management of prediabetes per primary team  - PT/OT following  - Dispo plan pending timing of stent    Further recs pending 
                                                                                                                                              Name: Yosef Ruvalcaba   MRN: [id number]  : 1935    Nephrology Progress Note    Assessment:  CKD stage 3a: Baseline serum Cr 1.3-1.4mg/dl. At risk for JALEEL (3/23/24)     Hyperkalemia: mild->was given oral Kcl for hypokalemia-> better      HTN: better     Right ICA stenosis     Multiple acute R MCA infarcts    Plan/Recommendations:  Neuro-interventional team planning R ICA angioplasty tomorrow  Ct IVF to minimize JALEEL  Trend K  Avoid nephtoxoins  Am labs    Subjective:  No events overnight. Family at bedside.     ROS:   No nausea, no vomiting  No chest pain, no shortness of breath    Exam:  /62   Pulse 71   Temp 97.6 °F (36.4 °C) (Oral)   Resp 16   Ht 1.702 m (5' 7.01\")   Wt 83.5 kg (184 lb 1.4 oz)   SpO2 100%   BMI 28.82 kg/m²     Gen: NAD  HEENT:  AT/NC  Lungs/Chest wall: Clear. Equal BS. No respiratory distress  Cardiovascular: Regular rate, normal rhythm.   Abdomen/: Soft, NT,  BS+  Ext: No peripheral edema  CNS: alert and awake.     Current Facility-Administered Medications   Medication Dose Route Frequency Provider Last Rate Last Admin    amLODIPine (NORVASC) tablet 10 mg  10 mg Oral Daily Radha Knowles MD   10 mg at 24 0821    0.9 % sodium chloride infusion   IntraVENous Continuous Kaz Awan APRN - NP 75 mL/hr at 24 0334 New Bag at 24 0334    metoprolol succinate (TOPROL XL) extended release tablet 50 mg  50 mg Oral Daily Jose Enrique Diez MD   50 mg at 24 0821    tamsulosin (FLOMAX) capsule 0.4 mg  0.4 mg Oral Daily Jose Enrique Diez MD   0.4 mg at 24 0821    sodium chloride flush 0.9 % injection 5-40 mL  5-40 mL IntraVENous 2 times per day Jose Enrique Diez MD   10 mL at 24 0822    sodium chloride flush 0.9 % injection 5-40 mL  5-40 mL IntraVENous PRN Jose Enrique Diez MD        0.9 % sodium chloride 
                                                                                                                                              Name: Yosef Ruvalcaba   MRN: [id number]  : 1935    Nephrology Progress Note    Assessment:  CKD stage 3a: Baseline serum Cr 1.3-1.4mg/dl. At risk for JALEEL (3/23/24) and again today     Hyperkalemia: mild->was given oral Kcl for hypokalemia-> better      HTN: better     Right ICA stenosis     Multiple acute R MCA infarcts    Plan/Recommendations:  Cardene drip  Ct IVF to minimize JALEEL  Trend renal indices  Bladder scan.   Avoid nephtoxoins  Am labs    Subjective:  S/p Right carotid stent. No complications. IN ICU. On Cardene drip.  Some difficulty with urination expressed by patient.    ROS:   No nausea, no vomiting  No chest pain, no shortness of breath    Exam:  /85   Pulse 87   Temp 97.4 °F (36.3 °C) (Oral)   Resp 12   Ht 1.702 m (5' 7.01\")   Wt 83.5 kg (184 lb 1.4 oz)   SpO2 94%   BMI 28.82 kg/m²     Gen: NAD  HEENT:  AT/NC  Lungs/Chest wall: Clear. Equal BS. No respiratory distress  Cardiovascular: Regular rate, normal rhythm.   Abdomen/: Soft, NT,  BS+  Ext: No peripheral edema  CNS: alert and awake.     Current Facility-Administered Medications   Medication Dose Route Frequency Provider Last Rate Last Admin    niCARdipine (CARDENE) 25 mg in sodium chloride 0.9 % 250 mL infusion ([id number])  2.5-15 mg/hr IntraVENous Continuous Kaz Awan APRN - NP 50 mL/hr at 24 1418 5 mg/hr at 24 1418    melatonin tablet 3 mg  3 mg Oral Nightly PRN Cesia Scanlon APRN - NP   3 mg at 24 2323    amLODIPine (NORVASC) tablet 10 mg  10 mg Oral Daily Radha Knowles MD   10 mg at 24 0911    0.9 % sodium chloride infusion   IntraVENous Continuous Kaz Awan APRN - NP 75 mL/hr at 24 1418 New Bag at 24 1418    metoprolol succinate (TOPROL XL) extended release tablet 50 mg  50 mg Oral Daily Jose Enrique Diez MD   50 mg at 
                                                                                                                                              Name: Yosef Ruvalcaba   MRN: [id number]  : 1935    Nephrology Progress Note    Assessment:  CKD stage 3a: Serum Cr 1.1mg/dl yesterday Baseline serum Cr 1.3-1.4mg/dl. At risk for JALEEL (3/23/24) and again 3/29/24 with R carotid stenting     Hyperkalemia: mild->was given oral Kcl for hypokalemia-> better      HTN: better    Metabolic acidosis: Mild. Hyperchloremia from IV NS     Right ICA stenosis     Multiple acute R MCA infarcts    Hypomagnesemia: better    Plan/Recommendations:  No new chem today  Trend renal indices  Add hold parameters to Hydralazine  Avoid nephtoxoins  Am labs    Subjective:  Laying in bed. +Hematuria/an still.  Left hand feels about the same    ROS:   No nausea, no vomiting  No chest pain, no shortness of breath    Exam:  BP (!) 113/58   Pulse 88   Temp 98.1 °F (36.7 °C) (Oral)   Resp 21   Ht 1.702 m (5' 7.01\")   Wt 91.1 kg (200 lb 13.4 oz)   SpO2 100%   BMI 31.45 kg/m²     Gen: NAD  HEENT:  AT/NC  Lungs/Chest wall: Clear. Equal BS. No respiratory distress  Cardiovascular: Regular rate, normal rhythm.   Abdomen/: Soft, NT,  BS+. An  Ext: No peripheral edema  CNS: alert and awake.     Current Facility-Administered Medications   Medication Dose Route Frequency Provider Last Rate Last Admin    ticagrelor (BRILINTA) tablet 30 mg  30 mg Oral BID Iva Mitchell APRN - NP   30 mg at 24 0903    aspirin chewable tablet 81 mg  81 mg Oral Daily Iva Mitchell APRN - NP   81 mg at 24 0856    hydrALAZINE (APRESOLINE) tablet 50 mg  50 mg Oral 3 times per day Maria Elena Raya MD   50 mg at 24 0553    melatonin tablet 3 mg  3 mg Oral Nightly PRN Cesia Scanlon APRN - NP   3 mg at 24 2323    amLODIPine (NORVASC) tablet 10 mg  10 mg Oral Daily Radha Knowles MD   10 mg at 24 0855    0.9 % sodium chloride infusion   
                                                                                                                                              Name: Yosef Ruvalcaba   MRN: [id number]  : 1935    Nephrology Progress Note    Assessment:  CKD stage 3a: Baseline serum Cr 1.3-1.4mg/dl. At risk for JALEEL (3/23/24)     Hyperkalemia: mild->was given oral Kcl for hypokalemia-> better today     HTN: better     Right ICA stenosis     Multiple acute R MCA infarcts    Plan/Recommendations:  I think his kidney function is at baseline  Can proceed with R ICA angioplasty from renal standpoint  Ct IVF to minimize JALEEL  Trend K  Avoid nephtoxoins  Am labs    Subjective:  No events overnight. Family at bedside.     ROS:   No nausea, no vomiting  No chest pain, no shortness of breath    Exam:  /62   Pulse 78   Temp 97.5 °F (36.4 °C) (Oral)   Resp 17   Ht 1.702 m (5' 7.01\")   Wt 83.5 kg (184 lb 1.4 oz)   SpO2 93%   BMI 28.82 kg/m²     Gen: NAD  HEENT:  AT/NC  Lungs/Chest wall: Clear. Equal BS. No respiratory distress  Cardiovascular: Regular rate, normal rhythm.   Abdomen/: Soft, NT,  BS+  Ext: No peripheral edema  CNS: alert and awake.     Current Facility-Administered Medications   Medication Dose Route Frequency Provider Last Rate Last Admin    amLODIPine (NORVASC) tablet 10 mg  10 mg Oral Daily Radha Knowles MD   10 mg at 24 0828    0.9 % sodium chloride infusion   IntraVENous Continuous Kaz Aawn APRN - NP 75 mL/hr at 24 0334 New Bag at 24 0334    metoprolol succinate (TOPROL XL) extended release tablet 50 mg  50 mg Oral Daily Jose Enrique Diez MD   50 mg at 24 0827    tamsulosin (FLOMAX) capsule 0.4 mg  0.4 mg Oral Daily Jose Enrique Diez MD   0.4 mg at 24 0828    sodium chloride flush 0.9 % injection 5-40 mL  5-40 mL IntraVENous 2 times per day Jose Enrique Diez MD   10 mL at 24 0828    sodium chloride flush 0.9 % injection 5-40 mL  5-40 mL IntraVENous PRN Chary 
                                                                                                                                              Name: Yosef Ruvalcaba   MRN: [id number]  : 1935    Nephrology Progress Note    Assessment:  CKD stage 3a: Baseline serum Cr 1.3-1.4mg/dl. At risk for JALEEL (3/23/24)     Hyperkalemia: mild->was given oral Kcl for hypokalemia-> better today     HTN: elevated (permissive)     Right ICA stenosis     Multiple acute R MCA infarcts    Plan/Recommendations:  IVF  Trend K-> should come down-> Low K diet  Plan for R ICA angioplasty by neuro-interventional team -> postponed again today  Trend renal indices  Am labs    Subjective:  No events overnight. Family at bedside.     ROS:   No nausea, no vomiting  No chest pain, no shortness of breath    Exam:  /67   Pulse 83   Temp 98.1 °F (36.7 °C) (Oral)   Resp 16   Ht 1.702 m (5' 7.01\")   Wt 83.5 kg (184 lb 1.4 oz)   SpO2 95%   BMI 28.82 kg/m²     Gen: NAD  HEENT:  AT/NC  Lungs/Chest wall: Clear. Equal BS. No respiratory distress  Cardiovascular: Regular rate, normal rhythm.   Abdomen/: Soft, NT,  BS+  Ext: No peripheral edema  CNS: alert and awake.     Current Facility-Administered Medications   Medication Dose Route Frequency Provider Last Rate Last Admin    amLODIPine (NORVASC) tablet 10 mg  10 mg Oral Daily Radha Knowles MD   10 mg at 24 0831    0.9 % sodium chloride infusion   IntraVENous Continuous Kaz Awan APRN - NP 75 mL/hr at 24 220 New Bag at 24 220    metoprolol succinate (TOPROL XL) extended release tablet 50 mg  50 mg Oral Daily Jose Enrique Diez MD   50 mg at 24 0831    tamsulosin (FLOMAX) capsule 0.4 mg  0.4 mg Oral Daily Jose Enrique Diez MD   0.4 mg at 24 0831    sodium chloride flush 0.9 % injection 5-40 mL  5-40 mL IntraVENous 2 times per day Jose Enrique Diez MD   10 mL at 24 0832    sodium chloride flush 0.9 % injection 5-40 mL  5-40 mL IntraVENous PRN 
                                                                                                                                              Name: oYsef Ruvalcaba   MRN: [id number]  : 1935    Nephrology Progress Note    Assessment:  CKD stage 3a: Serum Cr 1.2mg/dl today. Baseline serum Cr 1.3-1.4mg/dl. At risk for JALEEL (3/23/24) and again 3/29/24 with R carotid stenting     Hyperkalemia: mild-> then developed hypokalemia     HTN: better    Metabolic acidosis: Mild. Hyperchloremia from IV NS. Improved     Right ICA stenosis: s/p R carotid stent 3/29/24     Multiple acute R MCA infarcts    Hypomagnesemia: better    Hematuria/BPH: an trauma. Urology following.    Plan/Recommendations:  Stable for discharge if he is able to void s/p an removal  Oral Kcl 40meq x1 dose  Check BP at home. Avoid hypotension  Avoid nephtoxoins      Subjective:  Sitting up in the chair. Feels well  An removed this morning. Has not urinated yet post removal      ROS:   No nausea, no vomiting  No chest pain, no shortness of breath    Exam:  BP (!) 110/51   Pulse 77   Temp 98.2 °F (36.8 °C)   Resp 20   Ht 1.702 m (5' 7.01\")   Wt 91.1 kg (200 lb 13.4 oz)   SpO2 99%   PF (!) 2 L/min   BMI 31.45 kg/m²     Gen: NAD  HEENT:  AT/NC  Lungs/Chest wall: Clear. Equal BS. No respiratory distress  Cardiovascular: Regular rate, normal rhythm.   Abdomen/: Soft, NT,  BS+.   Ext: No peripheral edema  CNS: alert and awake.     Current Facility-Administered Medications   Medication Dose Route Frequency Provider Last Rate Last Admin    finasteride (PROSCAR) tablet 5 mg  5 mg Oral Daily Braydon Perales APRN - NP   5 mg at 24 1021    ticagrelor (BRILINTA) tablet 30 mg  30 mg Oral BID Iva Mitchell APRN - NP   30 mg at 24 1014    aspirin chewable tablet 81 mg  81 mg Oral Daily Iva Mitchell APRN - NP   81 mg at 24 1017    hydrALAZINE (APRESOLINE) tablet 50 mg  50 mg Oral 3 times per day Jose Cruz Hernandez MD   50 mg at 
                                                                                                Hospitalist Progress Note  Lana Ervin MD  Answering service: [phone] OR [phone] from in house phone        Date of Service:  3/31/2024  NAME:  Yosef Ruvalcaba  :  1935  MRN:  [id number]      Admission Summary:   Yosef Ruvalcaba is a 88 y.o. male with apmhx CAD s/p CABG, HTN, dyslipidemia, and LVOT obstruction who presented to the ED on 3/24  with left arm numbness and weakness, and was admitted for stroke, and severe right ICA stenosis.  He underwent R ICA angioplasty and stenting with distal embolic protetion on 3/29 by NIS, and was admitted to the ICU for monitoring post procedure.  Hospital course was complicated by enlarged prostate and elevated PSA requiring chronic indwelling an, and urology consultation.        Interval history / Subjective:   F/u for R ICA stenosis  s/p stent   D/W NIS okay for d/c from their point       Assessment & Plan:     #severe R ICA stenosis s/p stent on 3/29  #R MCA stroke  -strict BP control for goal SBP <120  -continue metoprolol and norvasc  -cardene gtt wean as tolerated + prn labetalol or hydralazine  -DAPT (ASA and brilinta+lipitor)  -NIS following and cleared for d/c     #prostatomegaly  #elevated PSA  -continue chronic an, and flomax  -urology following  -Continue CBI and manual irrigation as needed.      #CKD 3a (b/l creatinine 1.3-1.4)  -avoid renal toxins  -monitor  -nephrology following     Code status: Full  Prophylaxis: SCD  Care Plan discussed with: rn  Anticipated Disposition: tbd 24 hrs     Principal Problem:    Acute CVA (cerebrovascular accident) (HCC)  Active Problems:    Primary hypertension    Left-sided weakness    Acute ischemic right MCA stroke (HCC)    Symptomatic carotid artery stenosis, right    Hyperlipidemia    Prediabetes    Left arm weakness    Acute cerebrovascular accident (CVA) due to stenosis of right carotid artery (HCC)    Internal 
                                          Progress Note    Patient: Yoesf Ruvalcaba MRN: [id number]  SSN: [id number]    YOB: 1935  Age: 88 y.o.  Sex: male          ADMITTED:  3/23/2024 to Maria Elena Raya MD  for Left-sided weakness [R53.1]  Left arm weakness [R29.898]  Acute CVA (cerebrovascular accident) (HCC) [I63.9]           Yosef Ruvalcaba was admitted for Left-sided weakness [R53.1]  Left arm weakness [R29.898]  Acute CVA (cerebrovascular accident) (HCC) [I63.9].    88 y.o. male w/ PMH of HTN, CAD s/p CABG, dyslipidemia, CKD stage III who presented with left arm weakness and numbness who is admitted for severe right ICA stenosis and CVA. He underwent cerebral angiogram, right carotid angioplasty and stenting today by Neurointerventional surgery and was transferred to the ICU post-procedure.     Urology consulted yesterday for retention and difficulty an placement. In the ICU he has been unable to void with elevated residual of ~360 cc. +Flomax. He has never seen a Urologist. Prior US shows prostatomegaly.  He is anticoagulated with Plavix and ASA and received heparin for the procedure. UA on admission was unremarkable. Alert, denies complaints. 26fr 3 way in place on brisk CBI with clear to light pink UA. Manually irrigated, clear yellow output, no clots, no distress. Clamped CBI.    Afebrile, VSS  Hgb: 12.2 from 14.9  Cr: 1.11  UA: wnl ()      Vitals:  Temp (24hrs), Av.8 °F (36.6 °C), Min:96.6 °F (35.9 °C), Max:98.5 °F (36.9 °C)     Blood pressure 132/70, pulse 85, temperature 98.2 °F (36.8 °C), temperature source Axillary, resp. rate 16, height 1.702 m (5' 7.01\"), weight 83.5 kg (184 lb 1.4 oz), SpO2 96 %.      I&O's:   07 -  1900  In: 2225.7 [P.O.:250; I.V.:1975.7]  Out: 4550 [Urine:4550]    190 -  0700  In: 1138.1 [I.V.:1138.1]  Out: 1600 [Urine:1600]           Labs:   Recent Labs     24  0537   WBC 9.6   HGB 12.2   HCT 37.9   *     Recent Labs    
        [facility]  Hospitalist Group                                                                                          Hospitalist Progress Note  Radha Knowles MD  Answering service: [phone] OR [phone] from in house phone        Date of Service:  3/27/2024  NAME:  Yosef Ruvalcaba  :  1935  MRN:  [id number]       Admission Summary:   Yosef Ruvalcaba is a 88 y.o. male with past medical history significant for hypertension, CAD s/p CABG, dyslipidemia, presented at  the emergency room with left arm numbness and weakness.  Patient symptoms started at about 9 AM and progressively getting worse throughout the day.  Patient woke up with the symptoms and was perfectly  normal when he went to bed.  Patient denies difficulty with speech.  Patient has no prior history of for stroke/TIA.  He presented in the emergency room as code stroke level 2 alert.  CT of the head without contrast was negative.  CTA of the head and neck shows severe right ICA stenosis.  Patient was seen by teleneurologist at request of ED physician.  Admission to the hospital for stroke workup advised.  He was referred to the hospitalist service for that purpose.  Patient was also seen by neuro interventional surgery service NP.  Patient is outside TNK window and because of that this was not considered.  Patient was loaded with Plavix, he received aspirin prior to coming to the emergency room.  Patient was subsequently referred to the hospitalist service for admission       Interval history / Subjective:   Creatinine stable at 1.4, patient with hx of CKD.  Discussed with Nephrology and ok to proceed with angioplasty, sent a message to Dr. Yuen.   Unfortunately, patient ate breakfast, so not sure whether he can still get the procedure done today.  I changed him to NPO.   BP better controlled.   Patient without complaints.      Assessment & Plan:        Acute CVA:   -MRI brain:  1. Multiple small acute infarcts in the right 
        [facility] Adult  Hospitalist Group                                                                                          Hospitalist Progress Note  Radha Knowles MD  Answering service: [phone] OR [phone] from in house phone        Date of Service:  3/26/2024  NAME:  Yosef Ruvalcaba  :  1935  MRN:  [id number]       Admission Summary:   Yosef Ruvalcaba is a 88 y.o. male with past medical history significant for hypertension, CAD s/p CABG, dyslipidemia, presented at  the emergency room with left arm numbness and weakness.  Patient symptoms started at about 9 AM and progressively getting worse throughout the day.  Patient woke up with the symptoms and was perfectly  normal when he went to bed.  Patient denies difficulty with speech.  Patient has no prior history of for stroke/TIA.  He presented in the emergency room as code stroke level 2 alert.  CT of the head without contrast was negative.  CTA of the head and neck shows severe right ICA stenosis.  Patient was seen by teleneurologist at request of ED physician.  Admission to the hospital for stroke workup advised.  He was referred to the hospitalist service for that purpose.  Patient was also seen by neuro interventional surgery service NP.  Patient is outside TNK window and because of that this was not considered.  Patient was loaded with Plavix, he received aspirin prior to coming to the emergency room.  Patient was subsequently referred to the hospitalist service for admission       Interval history / Subjective:   Creatinine is still elevated, patient is on IV fluids, appreciate nephrology input.  Patient has no specific complaints.  SBP was over 200 this morning.  Plan discussed with patient and his wife at the bedside.  Plan discussed with neurointerventional surgery.     Assessment & Plan:        Acute CVA:   -MRI brain:  1. Multiple small acute infarcts in the right perirolandic region, predominantly  involving the precentral 
        [facility] Adult  Hospitalist Group                                                                                          Hospitalist Progress Note  Radha Knowles MD  Answering service: [phone] OR [phone] from in house phone        Date of Service:  3/25/2024  NAME:  Yosef Ruvalcaba  :  1935  MRN:  [id number]       Admission Summary:   Yosef Ruvalcaba is a 88 y.o. male with past medical history significant for hypertension, CAD s/p CABG, dyslipidemia, presented at  the emergency room with left arm numbness and weakness.  Patient symptoms started at about 9 AM and progressively getting worse throughout the day.  Patient woke up with the symptoms and was perfectly  normal when he went to bed.  Patient denies difficulty with speech.  Patient has no prior history of for stroke/TIA.  He presented in the emergency room as code stroke level 2 alert.  CT of the head without contrast was negative.  CTA of the head and neck shows severe right ICA stenosis.  Patient was seen by teleneurologist at request of ED physician.  Admission to the hospital for stroke workup advised.  He was referred to the hospitalist service for that purpose.  Patient was also seen by neuro interventional surgery service NP.  Patient is outside TNK window and because of that this was not considered.  Patient was loaded with Plavix, he received aspirin prior to coming to the emergency room.  Patient was subsequently referred to the hospitalist service for admission       Interval history / Subjective:   Creatinine was slightly up this morning, patient started on IV fluids, angioplasty canceled.  Consulted nephrology, as I suspect patient has underlying chronic kidney disease due to uncontrolled hypertension.  Creatinine has been up and down since admission.    On aspirin and Plavix, dosing per NIS.   BP better controlled.      Plan discussed with patient and his wife at the bedside.  Plan discussed with 
        [facility]  Hospitalist Group                                                                                          Hospitalist Progress Note  Rk Quiroga MD  Answering service: [phone] OR [phone] from in house phone        Date of Service:  3/29/2024  NAME:  Yosef Ruvalcaba  :  1935  MRN:  [id number]       Admission Summary:   Yosef Ruvalcaba is a 88 y.o. male with past medical history significant for hypertension, CAD s/p CABG, dyslipidemia, presented at  the emergency room with left arm numbness and weakness.  Patient symptoms started at about 9 AM and progressively getting worse throughout the day.  Patient woke up with the symptoms and was perfectly  normal when he went to bed.  Patient denies difficulty with speech.  Patient has no prior history of for stroke/TIA.  He presented in the emergency room as code stroke level 2 alert.  CT of the head without contrast was negative.  CTA of the head and neck shows severe right ICA stenosis.  Patient was seen by teleneurologist at request of ED physician.  Admission to the hospital for stroke workup advised.  He was referred to the hospitalist service for that purpose.  Patient was also seen by neuro interventional surgery service NP.  Patient is outside TNK window and because of that this was not considered.  Patient was loaded with Plavix, he received aspirin prior to coming to the emergency room.  Patient was subsequently referred to the hospitalist service for admission       Interval history / Subjective:   Follow up acute CVA, severe R ICA stenosis, HTN, CAD, HLD  Scheduled for angioplasty /stenting today     Assessment & Plan:        Acute CVA:   -MRI brain:  Multiple small acute infarcts in the right perirolandic region, predominantly  involving the precentral gyrus.  -Continue aspirin and Plavix;  -Continue statin / lipitor 40mg  -BP control  -PT/OT consulted; rec outpatient rehab - will provide script at 
        [facility] Progress Note        Yosef Ruvalcaba  1935  [id number]  3/30/2024      Assessment and plan:  ICA stenosis post stenting:   -stent placed this am by Dr Crawford  -cardene gtt   -BP < 120  -ordered hydralazine and labetalol prn   -schedule hydralazine 50 TID since we are now maxed on cardene   -cont hep gtt  -cont DAPT      ROYER on CKD stage III:  Cr baseline 1.3-1.4  -renal following    -risk of JALEEL   -Cr better today down from 1.4 ---> 1.3     Resistant HTN:  -home meds are amlodipine and HCTZ  -echo with concern for LVOT.  Target HR < 70    BPH:  -an placed by urology   -needs to stay for now   -CBI started   -cont tamsulosin      Hyperkalemia:   -K 4.5  -post lokelma     Discussed with NIS and hospitalist teams     HPI:  Better today.  BP at goal.  Off cardene       ICU DAILY CHECKLIST     Code Status:full   DVT Prophylaxis: lovenox  T/L/D: PIVs  SUP: not indicated  Diet: regular  Activity Level: mobilize daily   ABCDEF Bundle/Checklist Completed:Yes  Disposition: ok for downgrade  Multidisciplinary Rounds Completed: yes  Goals of Care Discussion/Palliative:yes  Patient/Family Updated: yes      OBJECTIVE  Vitals:    03/30/24 0800 03/30/24 0832 03/30/24 1120 03/30/24 1125   BP: 129/80  121/63 125/81   Pulse: 88  82 96   Resp: 23      Temp:       TempSrc: Oral      SpO2: 97%      Weight:  91.1 kg (200 lb 13.4 oz)     Height:         EXAM:   GEN: awake alert and oriented   HEENT  -Head: NC/AT;  -Eyes: PERRL, EOMI. No discharge or redness;  -Ears: External ears are normal. Normal TMs.  -Nose: Normal nares.  -Mouth and throat: MMM. Normal gums, mucosa, palate,. Good dentition.  NECK: Supple, with no masses. No JVD   CV: RRR, no m/r/g.  LUNGS: CTAB, no w/r/c.  ABD: Soft, NT/ND, no masses, NTTP  SKIN: Warm, well perfused. No skin rashes or abnormal lesions.  EXT: No clubbing, cyanosis, or edema.  NEURO: Normal muscle strength and tone. No focal deficits.cranial nerves intact    LABS: 
        [facility] Progress Note        Yosef Ruvalcaba  1935  [id number]  3/29/2024      Assessment and plan:  ICA stenosis post stenting:   -stent placed this am by Dr Crafword  -hansel gtt   -BP < 120  -ordered hydralazine and labetalol prn   -schedule hydralazine 50 TID since we are now maxed on cardene   -cont hep gtt  -cont DAPT     ROYER on CKD stage III:  Cr baseline 1.3-1.4  -renal following    -risk of JALEEL   -Cr better today down from 1.4 ---> 1.3    Resistant HTN:  -home meds are amlodipine and HCTZ    Hyperkalemia:   -K 4.5  -post lokelma      HPI:  Transfer to ICU for right ICA stenosis post stent by Dr Crawford.        ICU DAILY CHECKLIST     Code Status:full   DVT Prophylaxis: start lovenox   T/L/D: PIVs  SUP: not indicated   Diet: regular   Activity Level: mobilize daily   ABCDEF Bundle/Checklist Completed:Yes  Disposition: cont ICU care  Multidisciplinary Rounds Completed: yes  Goals of Care Discussion/Palliative: yes  Patient/Family Updated: yes      OBJECTIVE  Vitals:    03/29/24 1245 03/29/24 1300 03/29/24 1316 03/29/24 1329   BP: 126/80 120/83 123/80 137/85   Pulse: 90 92 86 87   Resp: 17 17 15 12   Temp:       TempSrc:       SpO2: 92% 99%  94%   Weight:       Height:         EXAM:   GEN: awake alert and oriented   HEENT  -Head: NC/AT;  -Eyes: PERRL, EOMI. No discharge or redness;  -Ears: External ears are normal. Normal TMs.  -Nose: Normal nares.  -Mouth and throat: MMM. Normal gums, mucosa, palate,. Good dentition.  NECK: Supple, with no masses. No JVD   CV: RRR, no m/r/g.  LUNGS: CTAB, no w/r/c.  ABD: Soft, NT/ND, no masses, NTTP  SKIN: Warm, well perfused. No skin rashes or abnormal lesions.  EXT: No clubbing, cyanosis, or edema.  NEURO: Normal muscle strength and tone. No focal deficits.cranial nerves intact    LABS:   Na 138  K 4.5  Cl 110  Cr 1.3  Mag 1.9    Hb 14.9    Imaging reviewed   CTA HN with severe right ICA stensosi       Maria Elena Raya MD    Pulmonary and Critical Care 
    Very concerning.      Urology following. I updated Dr Christopher.    
  Neurointerventional Surgery Progress Note  Neurocritical Care NP    Admit Date: 3/23/2024   LOS: 4 days      Daily Progress Note: 3/29/2024    Assessment:     Acute Ischemic CVA- Acute right Ischemic Infarcts in the perirolandic region, predominantly involving the precentral gyrus in the setting of symptomatic severe right ICA stenosis      Plan:   - planning for right ICA angioplasty and stenting today and transfer to ICU post procedure. Consent already done and in patient's chart.  - continue aspirin 325 mg daily and Plavix 75 mg daily, aspirin test therapeutic at 392 on 3/26/24, P2Y12 therapeutic at 146 on 3/26/24  - continue NS at 75 ml/hr for IV hydration   - NPO except meds after midnight  - allow permissive HTN, treat SBP >180  - ECHO with EF 65-70%. LV: Severely increased wall thickness. Severely increased ventricular mass. Findings consistent with severe concentric hypertrophy. Normal wall motion. Normal diastolic function. Moderate LVOT obstruction at rest. Not quantified on limited echo. 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 Image quality is technically difficult. Technically difficult study due to patient's body habitus.   - Lipid panel shows LDL 53.6, goal <70, on high dose Lipitor 40 mg daily  - Hgb A1C 5.8, management per primary team  - PT/OT/SLP evals  - Neurology signed off       Activity: Up with assistance   DVT ppx: SCDs  Dispo: TBD    Plan d/w Dr. Crawford, RN, patient, and patient's daughter.       HPI: Yosef Ruvalcaba is a 88 y.o. left-handed male with a PMH of CAD, MI s/p CABG, HTN, and hypercholesterolemia who presented to the ED as a stroke alert on 3/23/2024 with complaints of left hand/left arm weakness. LKW time was documented as 0900 AM yesterday, however, patient reports he has been having it on and off, but not able to give me a specific time frame of when it started. CT of Head showed no acute process.  He was not a candidate for TNK. CTA of the 
1430 - Pt complaining of pain, urge to void but unable to. Bladder scan showed 349 cc. Spoke to Dr. Hernandez; orders to straight cath. Unable to straight cath with regular kit; attempted with coude catheter. Blood clots and blood came out in catheter; did not get urine return. Catheter removed. Spoke to Dr. Raya regarding urology consult.    1520 - Unable to doppler right pedal pulse. BHARATI Mccurdy, notified and also unable to obtain. Duplex study ordered on right lower extremity.    1524 - Urology consult called to [facility].    1600 - Unable to doppler left pedal pulse with Dr. Crawford at bedside. Bilateral duplexes ordered. T 96.6. Madonna hugger placed.    1640 - Started continuous bladder irrigation per urology.    1937 - Consult called to vascular surgery. Spoke to Kaz regarding abnormal duplexes; verbal order to continue holding off on SCDs at this time.  
2100: Patient c/o of need to urinate. Frazier manually irrigated with clots returning. CBI continues.     0330: Patient c/o of need to urinate. CBI paused and patient bladder scanned for 5cc. Frazier manually irrigated with large clot return.   
Check aspirin and P2Y12 test in AM.    Kaz Awan, [facility]  Neurocritical care NP/Neurointerventional Surgery   
I have read and agree with Saundra Sanchez's charting.   
I have read and agree with Zayra Mosher RN documentation  
I have reviewed and agree with Tanya Briceño's documentation.   
NIS Brief Post Procedure Progress Note:    Patient is s/p right ICA angioplasty and stenting for symptomatic right carotid stenosis.     He is awake in bed. Right leg immobilizer in place. He denies any headache or vision changes. He is having difficulty voiding. RN tried to straight cath, but not successful. RN reported bladder scan was <400 ml. Urology consult has been placed.     Physical Exam:  Physical Exam:  General: NAD, appear stated age  Extremities: no cyanosis or significant edema, left pedal pulse and left posterior tibial pulse faint with palpation, but present via doppler. Right posterior tibial pulse faint with palpation, but also present via doppler. Right pedal pulse unable to palpate or doppler. Femoral pulses and popliteal pulse present via doppler in RLE.   Skin: Cool in feet bilaterally, otherwise, dry, and appropriate for ethnicity. Right groin arteriotomy site c/d/I. No hematoma, bleeding, or bruising noted. Capillary refill <3 seconds.  Neurologic Exam:  Alert and oriented x 4.  Appropriate affect, mood and behavior.   Normal fluency, repetition, comprehension and naming. Pupils equal, round and reactive to light.Visual fields intact. EOMs intact. Facial sensation intact. No facial asymmetry. No dysarthria. Tongue protrudes to midline, palate elevates symmetrically. Subtle left arm drift present. Strength 4+/5 in LUE. Strength 5/5 in LLE and RUE. Moves RLE with good strength, but did not fully assess due to recent right groin puncture site. Unable to extend fingers fully on left hand. Bulk and tone normal. No involuntary movements. Sensation intact throughout to light touch. No neglect. No ataxia with FTN in RUE. Slow to perform FTN in LUE due to weakness, but no significant ataxia. HTS intact in LLE. RLE not assessed due to recent groin puncture site. Gait deferred.      PLAN:  - Unable to doppler or palpate right pedal pulse, RN was able to doppler previously. Ordered VISHNU and right arterial 
NIS Brief Progress Note:    Labs reviewed:  PRU: 229 this afternoon after receiving 150 mg Plavix this am   - Switch pt to Brilinta 60 mg BID starting now   - Recheck P2Y12 four hours after Brilinta admin   - Decrease ASA to 81 mg daily    CUS reviewed:     No evidence of in-stent stenosis in the right carotid stent.    Probable less than 50% mechanical stenosis.    D/w Dr Crawford.     Iva Mitchell, APRN - NP  Neurocritical Care Nurse Practitioner  [phone]   
Neurocritical Care Brief Progress Note:    Acute right Ischemic Infarcts in the perirolandic region, predominantly involving the precentral gyrus in the setting of symptomatic severe right ICA stenosis       Physical Exam:  Gen: NAD, calm, cooperative  Neuro: A&O to person, time, and situation, disoriented to place. Follows commands. Speech clear. Affect normal. PERRL, 3 mm bilaterally. Blinks to threat. No disconjugate gaze present. EOMI. Face symmetric. Palate symmetric. Tongue midline. Kwame spontaneously. Strength 5/5 in RUE, RLE, LLE, 4/5 strength in LUE. LUE drift. Bulk and tone normal. No involuntary movements. Gait deferred.  Skin: Warm, dry, color appropriate for ethnicity.    2020: Pt seen on NSTU with daughter at bedside. Reviewed lab results with patient and family, explained importance of closely monitoring renal function prior to and following angio. Daughter expresses frustration with delay, but understands that this is in the best interest of her dad's overall health. Will continue DAPT, hydration and will trend BMP. Neurology and Nephrology also following.     0630: No acute events overnight. CMP pending.     Tiffanie Ignacio, APRN - CNP  Neurocritical Care Nurse Practitioner  [phone]    
Neurocritical Care Brief Progress Note:    Acute right Ischemic Infarcts in the perirolandic region, predominantly involving the precentral gyrus in the setting of symptomatic severe right ICA stenosis       Physical Exam:  Gen: NAD, calm, cooperative  Neuro: A&O to person, time, and situation, disoriented to place. Follows commands. Speech clear. Affect normal. PERRL, 3 mm bilaterally. Blinks to threat. No disconjugate gaze present. EOMI. Face symmetric. Palate symmetric. Tongue midline. Kwame spontaneously. Strength 5/5 in RUE, RLE, LLE, 4/5 strength in LUE. LUE drift. Bulk and tone normal. No involuntary movements. Gait deferred.  Skin: Warm, dry, color appropriate for ethnicity.    3/25 angio plan placed on hold for elevated BUN/CR. Will follow AM BMP after IV hydration today. Assays pending for am.     Plan  DSA tomorrow w/ angioplasty & stent to right ICA  Consent signed, in chart  NPO after 0000, except sips w/ meds  Repeat Asa test and P2Y12 in AM  Continue asa 325mg daily and clopidogrel 75mg daily  Continue atorvastatin 40mg daily      0515: No acute events overnight. Cr trending upward overnight despite IVF. BUN within normal range. Will notify AM team on signout.     AIXA Jacome - CNP  Neurocritical Care Nurse Practitioner  [phone]    
Neurocritical Care Brief Progress Note:    Per review of note by Dr. Hernandez w/ Nephrology, pt most likely at baseline renal function therefore we will plan for cerebral angiogram w/ angioplasty and stent to right ICA on 3/29/24.    Discussed w/ Dr. Crawford, Dr. Knowles, pt and his daughter    Kallie Calvin, APRN - NP  Neurocritical Care Nurse Practitioner    
Neurocritical Care Brief Progress Note:    Pt is 88 y.o LH male who presented to [facility] ED on 3/23/24 as code stroke for left arm weakness. CTH showed no acute process, Pt was outside TNK window. CTAh-n revealed severe right ICA stenosis. NIHSS 1. Pt was admitted for stroke workup, started on asa and clopidogrel loads for stroke prevention. NIS was consulted for symptomatic right ICA stenosis.     3/24 - P2Y12 209, so additional clopidogrel 150mg load given. Although Asa test 422 (therapeutic), pt prescribed full dose asa 325mg daily.    Pt seen lying in bed in no distress, daughter at bedside.  He reports left arm weakness.  Denies headache, vision changes, numbness or tingling, dizziness, nausea, shortness of breath, and chest pain.    Plan  DSA tomorrow w/ angioplasty & stent to right ICA  Consent signed, in chart  NPO after 0000, except sips w/ meds  Repeat Asa test and P2Y12 in AM  Continue asa 325mg daily and clopidogrel 75mg daily  Continue atorvastatin 40mg daily      Physical Exam:  Gen: NAD, calm, cooperative  Neuro: A&O to person, time, and situation, disoriented to place. Follows commands. Speech clear. Affect normal. PERRL, 3 mm bilaterally. Blinks to threat. No disconjugate gaze present. EOMI. Face symmetric. Palate symmetric. Tongue midline. Kwame spontaneously. Strength 5/5 in RUE, RLE, LLE, 4/5 strength in LUE. LUE drift. Bulk and tone normal. No involuntary movements. Gait deferred.  Skin: Warm, dry, color appropriate for ethnicity.     AIXA Cat - NP  Neurocritical Care Nurse Practitioner    
Neurocritical Care Brief Progress Note:    Yosef Ruvalcaba is a 88 y.o. LH male who presented to [facility] ED as a stroke alert on 3/23/2024 w/ complaints of left hand/left arm weakness. CTH showed no acute process.  He was not a candidate for TNK. CTAh-n showed severe right ICA stenosis. MRI brain revealed acute right Ischemic Infarcts in the perirolandic region, predominantly involving the precentral gyrus. S/p cerebral angiogram w/ right ICA angioplasty and stenting by Dr. Crawford on 3/29/24.  Neuro exam stable post procedure, however pt found to have urinary retention w/ an catheter placement by Urology and gross hematuria. He was started on CBI and found to have elevated PSA.    No acute events today. Pt seen lying in bed in no distress. He reports left hand weakness. Denies headache, vision changes, dizziness, numbness or tingling, nausea, shortness of breath, and chest pain.    Plan  SBP goal   Continue asa 81mg daily and ticagrelor 30mg BID  Continue atorvastatin 40mg qHS  Urology following for hematuria and elevated PSA - if prostate biopsy is needed we cannot hold DAPT even for a brief period of time d/t risk of right ICA stent thrombosis       Physical Exam:  Gen: NAD, calm, cooperative  Neuro: A&Ox4. Follows commands. Speech clear. Affect normal. PERRL, 3 mm bilaterally. Blinks to threat. No disconjugate gaze present. EOMI. Face symmetric. Palate symmetric. Tongue midline. Kwame spontaneously. Strength 5/5 in RUE, RLE, LLE, 4/5 strength in LUE w/ pronator drift. Bulk and tone normal. No involuntary movements. Gait deferred.  Skin: Warm, dry, color appropriate for ethnicity.     AIXA Cat - NP  Neurocritical Care Nurse Practitioner    
Neurocritical Care Brief Progress Note:  Yosef Ruvalcaba is 88 y.o M here with symptomatic acute stroke from severe right ICA stenosis.     03/29/24~s/p DELANEY angioplasty and stenting by Dr. Crawford.     03/30/24~Pt non-responsive to Plavix. Now switched to Brilinta 60 mg BID with repeat p2y12 pending. CUS showed no evidence of in-stent stenosis in the right carotid stent. Probable less than 50% mechanical stenosis.    Pt downgraded to nstu and doing well with no new neuro issues.   stable neuro exams with minimal LUE drift.  is stronger.     Addendum @ 22:25  PRU therapeutic (55)     Review of Systems:   Patient denied any headache, eye, ear nose, throat problems; no coughing or wheezing or shortness of breath, No chest pain or orthopnea, no abdominal pain, nausea or vomiting, No pain in the body or extremities, no psychiatric, neurological, endocrine, hematological or cardiac complaints except as noted above.     Physical Exam:   Blood pressure (!) 128/59, pulse 95, temperature 98.3 °F (36.8 °C), temperature source Oral, resp. rate 20, height 1.702 m (5' 7.01\"), weight 91.1 kg (200 lb 13.4 oz), SpO2 96 %. Net IO Since Admission: -8,541.65 mL [03/30/24 2157]    GEN: Cooperative, Calm, Well developed, well nourished patient in NAD  HEENT: Normocephalic. Non-icter, no congestion  Lungs: CTA bilaterally Ant, non-labored breathing, RA  Cardiac: S1,S2, normal rate and rhythm, with no murmurs. no gallops  Abdomen: Normal bowel sounds, no distention, soft, non-tender  Extremities: 2+ Radial pulses, no clubbing, cyanosis, or edema  Skin: no rashes or lesions noted      NEURO:  Mental status:  Alert and oriented x 4. Able to follow commands appropriately  Cranial Nerves Attention and fund of knowledge is intact. Speech/Language is intact. Normal recent and remote memory. EOMI, PERRL, 3mm no nystagmus, no ptosis. No dysarthria or aphasia. Full facial strength, no asymmetry. Hearing intact bilaterally. Tongue protrudes to 
Neurocritical Care Brief Progress Note:  Yosef Ruvalcaba is 88 y.o M here with symptomatic acute stroke from severe right ICA stenosis.     03/29/24~s/p DELANEY angioplasty and stenting by Dr. Crawford. Right groin arteriotomy site is C/D/I. No hematoma, bleeding, or bruising noted. No tenderness. + distal pulses with use of doppler.     Pt currently relaxing in bed in NAD. Pt  denied any pain, no new neuro issues reported or noted during evaluation. Stable neuro exams.     Review of Systems:   Patient denied any headache, eye, ear nose, throat problems; no coughing or wheezing or shortness of breath, No chest pain or orthopnea, no abdominal pain, nausea or vomiting, No pain in the body or extremities, no psychiatric, neurological, endocrine, hematological or cardiac complaints except as noted above.     Physical Exam:   Blood pressure 124/62, pulse 79, temperature 98.2 °F (36.8 °C), temperature source Axillary, resp. rate 15, height 1.702 m (5' 7.01\"), weight 83.5 kg (184 lb 1.4 oz), SpO2 96 %. Net IO Since Admission: -7,285.17 mL [03/29/24 2308]    GEN: Cooperative, Calm, Well developed, well nourished patient in NAD  HEENT: Normocephalic. Non-icter, no congestion  Lungs:  CTA bilaterally Ant, non-labored breathing, RA  Cardiac: S1,S2, normal rate and rhythm, with no murmurs. no gallops  Abdomen: Normal bowel sounds, no distention, soft, non-tender  Extremities: 2+ Radial pulses, no clubbing, cyanosis, or edema  Skin: no rashes or lesions noted      NEURO:  Mental status:  Alert and oriented x 4. Able to follow commands appropriately  Cranial Nerves Attention and fund of knowledge is intact. Speech/Language is intact. Normal recent and remote memory. EOMI, PERRL, 3mm no nystagmus, no ptosis. No dysarthria or aphasia. Full facial strength, no asymmetry. Hearing intact bilaterally. Tongue protrudes to midline, palate elevates symmetrically. Shrug Shoulders B/L  Motor: Normal bulk and tone, Mild left arm drift present. 
Neurocritical Care Brief Progress Note:  Yosef Ruvalcaba is 88 y.o M here with symptomatic acute stroke from severe right ICA stenosis.     Pt currently sleeping and denied any pain. Spouse at the bedside. Pt  neuro exams is stable with no new neuro issues or complaint.      Review of Systems:   Patient denied any headache, eye, ear nose, throat problems; no coughing or wheezing or shortness of breath, No chest pain or orthopnea, no abdominal pain, nausea or vomiting, No pain in the body or extremities, no psychiatric, neurological, endocrine, hematological or cardiac complaints except as noted above.     Physical Exam:   Blood pressure (!) 142/68, pulse 66, temperature 98.1 °F (36.7 °C), temperature source Oral, resp. rate 17, height 1.702 m (5' 7.01\"), weight 83.5 kg (184 lb 1.4 oz), SpO2 98 %. Net IO Since Admission: -7,252.28 mL [03/28/24 2223]    GEN: Cooperative, Calm, Well developed, well nourished patient in NAD  HEENT: Normocephalic. Non-icter, no congestion  Lungs:  CTA bilaterally Ant, non-labored breathing, RA  Cardiac: S1,S2, normal rate and rhythm, with no murmurs. no gallops  Abdomen: Normal bowel sounds, no distention, soft, non-tender  Extremities: 2+ Radial pulses, no clubbing, cyanosis, or edema  Skin: no rashes or lesions noted      NEURO:  Mental status: disoriented to place otherwise oriented to time, situation, and person. Able to follow commands appropriately  Cranial Nerves Attention and fund of knowledge is intact. Speech/Language is intact. Normal recent and remote memory. EOMI, PERRL, no nystagmus, no ptosis. No dysarthria or aphasia. Full facial strength, no asymmetry. Hearing intact bilaterally. Tongue protrudes to midline, palate elevates symmetrically. Shrug Shoulders B/L  Motor:  Normal bulk and tone, Mild left arm drift present. Strength 4+/5 in LUE. Rest of ext are 5/5 strength proximally and distally; No involuntary movements.  Coordination: TAWNY FTN and HTS testing  Reflexes:  +2 
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, predominantly involving the precentral gyrus in the setting of symptomatic severe right ICA stenosis      Plan:     - Plans for cerebral angiogram and right ICA angioplasty and stenting by Dr Crawford, canceled today due to rising Cr, will proceed once renal function improves  - Cont DAPT with  mg daily and Plavix 75 mg daily,   - ARU: therapeutic at 385, PRU: therapeutic at 146 this am   - Serum Cr this morning at 1.47 from 1.36 yesterday, nephrology consulted by primary team to address  - NS at 75 ml/hr for hydration   - Allow permissive HTN, SBP goal 100-180  - Lipid panel shows LDL 53.6, at goal <70, cont Lipitor 40 mg daily  - PT/OT/SLP   - Neurology following for further stroke work up    Plan d/w Dr. Crawford, pt, and his family members at bedside.     HPI: Yosef Ruvalcaba is a 88 y.o. left-handed male with a PMH of CAD, MI s/p CABG, HTN, and hypercholesterolemia who presented to the ED as a stroke alert on 3/23/2024 with complaints of left hand/left arm weakness. LKW time was documented as 0900 AM yesterday, however, patient reports he has been having it on and off, but not able to give me a specific time frame of when it started. CT of Head showed no acute process.  He was not a candidate for TNK. CTA of the head and neck showed severe right ICA stenosis, no large vessel occlusion, or perfusion abnormality seen on CTP. He denies any prior history of a CVA. Patient is not the best historian. I was able to gather some history from the patient's family at bedside. Patient does takes 81 mg of aspirin daily at home. Neurointerventional Surgery is consulted for evaluation due to severe right ICA stenosis seen on CTA. Patient was started on Aspirin and Aspirin for stroke prevention and possible stent 
Neurointerventional Surgery Progress Note  Neurocritical Care NP    Admit Date: 3/23/2024   LOS: 5 days      Daily Progress Note: 3/30/2024    Assessment:     Acute right Ischemic Infarcts in the perirolandic region, predominantly involving the precentral gyrus in the setting of symptomatic severe right ICA stenosis, cerebral angiogram and right ICA angioplasty and stenting by Dr Crawford 3/29/24     Plan:     - Cont DAPT with  mg daily and Plavix 75 mg daily (will give Plavix 150 mg this am due to subtherapeutic assays and recheck P2Y12 at 1300)  - ARU: therapeutic at 413, PRU: not therapeutic this am at 225  - Post stenting CUS ordered   - Serum Cr improved this morning at 1.11, Nephrology following  - NS at 75 ml/hr for hydration   - SBP goal   - Lipid panel shows LDL 53.6, at goal <70, cont Lipitor 40 mg daily  - PT/OT/SLP   - Neurology signed off  - Urology following for hematuria, placed coude with irrigation yesterday  - Vascular surgery following due to absent pulses, appreciate recs    Plan d/w Dr. Crawford and Dr Raya.     OK to downgrade out of ICU.     HPI: Yosef Ruvalcaba is a 88 y.o. left-handed male with a PMH of CAD, MI s/p CABG, HTN, and hypercholesterolemia who presented to the ED as a stroke alert on 3/23/2024 with complaints of left hand/left arm weakness. LKW time was documented as 0900 AM yesterday, however, patient reports he has been having it on and off, but not able to give me a specific time frame of when it started. CT of Head showed no acute process.  He was not a candidate for TNK. CTA of the head and neck showed severe right ICA stenosis, no large vessel occlusion, or perfusion abnormality seen on CTP. He denies any prior history of a CVA. Patient is not the best historian. I was able to gather some history from the patient's family at bedside. Patient does takes 81 mg of aspirin daily at home. Neurointerventional Surgery is consulted for evaluation due to severe symptomatic 
Neurointerventional Surgery Progress Note  Neurocritical Care NP    Admit Date: 3/23/2024   LOS: 6 days      Daily Progress Note: 3/31/2024    Assessment:     Acute right Ischemic Infarcts in the perirolandic region, predominantly involving the precentral gyrus in the setting of symptomatic severe right ICA stenosis, cerebral angiogram and right ICA angioplasty and stenting by Dr Crawford 3/29/24     Plan:     - Cont DAPT with ASA 81 mg daily and Brilinta 30 mg BID, check P2Y12 this afternoon  - PRU: therapeutic this am at 55, will reduce Brilinta dose from 60 mg to 30 mg given gross hematuria in an this am  - Post op CUS with patent stent  - SBP goal   - Lipid panel shows LDL 53.6, at goal <70, cont Lipitor 40 mg daily  - PT/OT/SLP   - Neurology signed off  - Urology following for hematuria and elevated PSA, if prostate biopsy is needed we cannot hold ASA/Brilinta even for a brief period of time due to risk of stent thrombosis  - Vascular surgery consulted due to absent pulses, noted with chronic changes and will follow up outpatient.     Plan d/w Dr. Crawford and Dr Ervin.     OK for DC from [facility] standpoint, however, pending Urology recs for prostatomegaly and hematuria. Pt to follow up with Dr Crawford in [facility], message sent to office staff to assist with scheduling.     HPI: Yosef Ruvalcaba is a 88 y.o. left-handed male with a PMH of CAD, MI s/p CABG, HTN, and hypercholesterolemia who presented to the ED as a stroke alert on 3/23/2024 with complaints of left hand/left arm weakness. LKW time was documented as 0900 AM yesterday, however, patient reports he has been having it on and off, but not able to give me a specific time frame of when it started. CT of Head showed no acute process.  He was not a candidate for TNK. CTA of the head and neck showed severe right ICA stenosis, no large vessel occlusion, or perfusion abnormality seen on CTP. He denies any prior history of a CVA. Patient is not the best 
Neurointerventional Surgery Progress Note  Neurocritical Care NP    Admit Date: 3/23/2024   LOS: 7 days      Daily Progress Note: 4/1/2024    Assessment:     Acute right Ischemic Infarcts in the perirolandic region, predominantly involving the precentral gyrus in the setting of symptomatic severe right ICA stenosis, cerebral angiogram and right ICA angioplasty and stenting by Dr Crawford 3/29/24     Plan:     - Cont DAPT with ASA 81 mg daily and Brilinta 30 mg BID  - PRU: 53, therapeutic 3/31/24, ARU: 413, therapeutic 3/29/24  - Post op CUS with patent stent  - SBP goal   - Lipid panel shows LDL 53.6, at goal <70, cont Lipitor 40 mg daily  - PT/OT/SLP   - Neurology signed off  - Urology following for hematuria and elevated PSA, if prostate biopsy is needed we cannot hold ASA/Brilinta even for a brief period of time due to risk of stent thrombosis  - Vascular surgery consulted due to absent pulses, noted with chronic changes and will follow up outpatient.     Plan d/w Dr. Crawford and Urology NP Diaz. Pt family members at bedside updated on plan of care.     OK for DC from [facility] standpoint, however, pending Urology recs for prostatomegaly and hematuria. Pt to follow up with Dr Crawford in [facility], message sent to office staff to assist with scheduling.     HPI: Yosef Ruvalcaba is a 88 y.o. left-handed male with a PMH of CAD, MI s/p CABG, HTN, and hypercholesterolemia who presented to the ED as a stroke alert on 3/23/2024 with complaints of left hand/left arm weakness. LKW time was documented as 0900 AM yesterday, however, patient reports he has been having it on and off, but not able to give me a specific time frame of when it started. CT of Head showed no acute process.  He was not a candidate for TNK. CTA of the head and neck showed severe right ICA stenosis, no large vessel occlusion, or perfusion abnormality seen on CTP. He denies any prior history of a CVA. Patient is not the best historian. I was able to 
Notified by CT tech that line infiltrated while administering contrast.   
Occupational Therapy  03/25/24    Chart reviewed. Patient currently ALAYNA for angio with possible R ICA stent. Will follow up for OT re-evaluation as able/appropriate.     Thank you,   ERIKA Long, OTR/L    
Occupational Therapy  03/29/24    Chart reviewed. Attempted to see for OT treatment however ALAYNA for angio with possible R ICA intervention. Will follow up as able/appropriate post-procedure.    Thank you,   ERIKA Long, OTR/L    
Occupational Therapy  3/30/2024    Attempted to see patient for OT re-evaluation s/p DELANEY angioplasty and stenting. Noted testing at bedside at time of attempt. Will defer at this time and re-attempt as able. Thank you for your consideration.     Jonna Henry, OTD, OTR/L    
Orders received, chart reviewed and patient evaluated by occupational therapy. Pending progression with skilled acute occupational therapy, recommend:  Outpatient occupational therapy for FM/GM coordination in LUE    Recommend with nursing patient to complete as able in order to maintain strength, endurance and independence: OOB to chair 3x/day, ADLs with supervision/setup and mobilizing to the bathroom for toileting with 1 assist. Thank you for your assistance.     Full evaluation to follow.     
Physical Therapy 3/29/2024    Chart reviewed up to date. Pt ALAYNA for angio. Will continue to follow.    Thank you.  Brielle Pace, PT, DPT    
Physical Therapy Note  03/25/2024    Chart reviewed in prep for PT tx session; pt currently off unit for R ICA angio and stent. Will defer and continue to follow as appropriate.    Thank you,  Magda Lee, PT, DPT  
SLP Contact Note    Consult received and chart reviewed in preparation for evaluation. Patient being worked-up for stroke. CT negative for acute infarct.  NIH 1. Pt passed swallow screen and has been initiated on diet.  Therefore, will await MRI and follow up for evaluation as indicated.    Delia Barrios M.Ed, PhD(c), CCC-SLP  Speech-Language Pathologist      Hospital Course  3/23: Admitted for numbness. NIH 1 for drift of left arm.     Swallow Screen: Passed    Prandial Aspiration Risk Factors: ?stroke  Post-Prandial Aspiration Risk Factors: CABG    Pertinent Imaging and Labs:  3/23/24 CT Head:  IMPRESSION:  No acute process seen    3/23/24 CTA Head/Neck w Contrast:  IMPRESSION:  1.  Severe right ICA stenosis.  2.  No perfusion abnormality or intracranial large vessel occlusion.  3.  No significant intracranial atherosclerosis.       WBC: 7.4 normal  Urinalysis: Normal 3/23/24    Previously seen by SLP:  No.  Reason for consult: Stroke work-up      
Speech Therapy Contact Note    Order received and chart reviewed. Patient NPO for procedure today. Will follow-up for SLP evaluation as able and appropriate.    Tanya Garnett, CCC-SLP    
Spiritual Care Assessment/Progress Note  [facility]    Name: Yosef Ruvalcaba MRN: [id number]    Age: 88 y.o.     Sex: male   Language: English     Date: 4/1/2024            Total Time Calculated: 13 min              Spiritual Assessment begun in [facility] NEURO-SCI TELE  Service Provided For:: Patient and family together  Referral/Consult From:: Rounding  Encounter Overview/Reason : Initial Encounter    Spiritual beliefs:      [] Involved in a dahlia tradition/spiritual practice:      [] Supported by a dahlia community:      [] Claims no spiritual orientation:      [] Seeking spiritual identity:           [] Adheres to an individual form of spirituality:      [x] Not able to assess:                Identified resources for coping and support system:   Support System: Unknown       [] Prayer                  [] Devotional reading               [] Music                  [] Guided Imagery     [] Pet visits                                        [] Other: (COMMENT)     Specific area/focus of visit   Encounter:    Crisis:    Spiritual/Emotional needs: Type: Spiritual Support  Ritual, Rites and Sacraments:    Grief, Loss, and Adjustments:    Ethics/Mediation:    Behavioral Health:    Palliative Care:    Advance Care Planning:      Plan/Referrals: No future visits requested    Narrative:         Upon arrival, I provided spiritual care to Yosef Ruvalcaba on [facility] NEURO-SCI TELE. I reviewed the patient's medical record to the spiritual care visit. There was a visitor, named Benita present. At the time of visit, they were watching the television. According to the patient and his visitor, he was preparing to be discharged.       Please contact  paging service for any immediate needs  _____________________________  Piper Vargas M.Div.   Chaplain Resident         
Spiritual Care Partner Volunteer visited patient at [facility] in [facility] 6S NEURO-SCI TELE on 3/26/2024   Documented by:  Joey Han MDIV, BCC  
Spoke with BAIRON Fowler and informed her patient's procedure was cancelled and patient transported back to room by 2 RNs also informed patient's daughter of update  
Stroke Education provided to patient and relative(s) and the following topics were discussed    1. Patients personal risk factors for stroke are family history and hyperlipidemia    2. Warning signs of Stroke:        * Sudden numbness or weakness of the face, arm or leg, especially on one side of          The body            * Sudden confusion, trouble speaking or understanding        * Sudden trouble seeing in one or both eyes        * Sudden trouble walking, dizziness, loss of balance or coordination        * Sudden severe headache with no known cause      3. Importance of activation Emergency Medical Services ( 9-1-1 ) immediately if experience any warning signs of stroke.    4. Be sure and schedule a follow-up appointment with your primary care doctor or any specialists as instructed.       6.  Smoking and second-hand smoke greatly increase your risk of stroke, cardiovascular disease and death. Smoking history never    7. Information provided was [facility] Stroke Education Binder, Stroke Handouts, or Verbal Education    8. Documentation of teaching completed in Patient Education Activity and on Care Plan with teaching response noted?  yes    
Inattention: 0=No abnormality    Total:    1       ANTIPLT/AC/ANTITHROMB: NO    CT Result (most recent):  CT HEAD WO CONTRAST  Narrative: EXAM: CT CODE NEURO HEAD WO CONTRAST    INDICATION: L hand weakness    COMPARISON: 2/28/2023.    CONTRAST: None.    TECHNIQUE: Unenhanced CT of the head was performed using 5 mm images. Brain and  bone windows were generated. Coronal and sagittal reformats. CT dose reduction  was achieved through use of a standardized protocol tailored for this  examination and automatic exposure control for dose modulation.      FINDINGS:  The ventricles and sulci are normal in size, shape and configuration. There is  no significant white matter disease. There is no intracranial hemorrhage,  extra-axial collection, or mass effect. The basilar cisterns are open. No CT  evidence of acute infarct.    The bone windows demonstrate no abnormalities. The visualized portions of the  paranasal sinuses and mastoid air cells are clear.  Impression: No acute process seen    Assessment/Plan:   88 y.o. male with history of HTN, CAD, Heart disease, CABG, hypercholesterolemia on Aspirin 81 mg daily presents to the ED with dominant left hand and arm weakness outside the window for TNK, without LVO on CTA head/neck and without perfusion deficit on CT perfusion.    -CT/CTP/CTA h/n as above  -tNK Candidate: NO  -Mechanical Thrombectomy Candidate: NO  -Low threshold to repeat CT head if any worsening change in neurologic condition  - Admit for Stroke work up  - Consult Neurology  - Start DAPT therapy  - obtain MRI Brain w/o.     Discussed with: Dr. Shelley Redding MD, Primary nurse, patient and Dr. Bryson - ED Physician.    Time spent: 40 minutes.     AIXA Covarrubias - NP  Neurocritical Care Nurse Practitioner  [phone]    
Prostatomegaly   Gross hematuria 2/2 catheter trauma   Elevated PSA, Abnormal ANSHU  ICA stenosis s/p stenting on AC     - Urine clearing. Discussed option for catheter removal today vs outpatient voiding trial. He is eager to have the catheter out now. Remove an and check post void bladder scan, will need to replace an for PVR>400 cc. Continue daily Flomax. Finasteride added for presumed prostatic bleeding.   - PSA concerning for prostate cancer. After discussion, he wishes to pursue further workup. Discussed his case with the Neurointerventional team, he cannot come off of DAPT x 3 months. Will obtain CXR and CT A/P imaging now with plans for further workup to include biopsy on an outpatient basis once his blood thinners can be safely paused. Our office has arranged follow up. Please call if we can be of further assistance.     Supervising Dr. ADORE ROBISON  Signed By: AIXA Mclain - NP - April 1, 2024  
difficulty, nausea, or vomiting.     Current Facility-Administered Medications   Medication Dose Route Frequency Provider Last Rate Last Admin    amLODIPine (NORVASC) tablet 10 mg  10 mg Oral Daily Radha Knowles MD   10 mg at 24    0.9 % sodium chloride infusion   IntraVENous Continuous Kaz Awan APRN - NP 75 mL/hr at 24 0334 New Bag at 24 0334    metoprolol succinate (TOPROL XL) extended release tablet 50 mg  50 mg Oral Daily Jose Enrique Diez MD   50 mg at 24    tamsulosin (FLOMAX) capsule 0.4 mg  0.4 mg Oral Daily Jose Enrique Diez MD   0.4 mg at 24    sodium chloride flush 0.9 % injection 5-40 mL  5-40 mL IntraVENous 2 times per day Jose Enrique Diez MD   10 mL at 24    sodium chloride flush 0.9 % injection 5-40 mL  5-40 mL IntraVENous PRN Jose Enrique Diez MD        0.9 % sodium chloride infusion   IntraVENous PRN Jose Enrique Diez MD        ondansetron (ZOFRAN-ODT) disintegrating tablet 4 mg  4 mg Oral Q8H PRN Jose Enrique Diez MD        Or    ondansetron (ZOFRAN) injection 4 mg  4 mg IntraVENous Q6H PRN Jose Enrique Diez MD        polyethylene glycol (GLYCOLAX) packet 17 g  17 g Oral Daily PRN Jose Enrique Diez MD        enoxaparin (LOVENOX) injection 40 mg  40 mg SubCUTAneous Daily Jose Enrique Diez MD   40 mg at 24    aspirin tablet 325 mg  325 mg Oral Daily Kaz Awan APRN - NP   325 mg at 24    clopidogrel (PLAVIX) tablet 75 mg  75 mg Oral Daily Kaz Awan APRN - NP   75 mg at 24    atorvastatin (LIPITOR) tablet 40 mg  40 mg Oral Daily Maria Elena Costello MD   40 mg at 24     No Known Allergies    Review of Systems:  Pertinent items are noted in the History of Present Illness.    Objective:     Vital signs  Temp (24hrs), Av.9 °F (36.6 °C), Min:97.5 °F (36.4 °C), Max:98.1 °F (36.7 °C)   No intake/output data recorded.   190 -  0700  In: -   Out: 1950 [Urine:1950]    BP 
occlusion, or perfusion abnormality seen on CTP. He denies any prior history of a CVA. Patient is not the best historian. I was able to gather some history from the patient's family at bedside. Patient does takes 81 mg of aspirin daily at home. Neurointerventional Surgery is consulted for evaluation due to severe right ICA stenosis seen on CTA. Patient was started on Aspirin and Aspirin for stroke prevention and possible stent placement.     Subjective:   Patient awake in bed. No acute events overnight. Patient's daughter at bedside.     He denies any headache, vision changes, chest pain, SOB, numbness, tingling, speech difficulty, nausea, or vomiting. He still complains of left arm/left hand weakness.     Current Facility-Administered Medications   Medication Dose Route Frequency Provider Last Rate Last Admin    hydroCHLOROthiazide (HYDRODIURIL) tablet 12.5 mg  12.5 mg Oral Daily Jose Enrique Diez MD   12.5 mg at 03/24/24 0908    metoprolol succinate (TOPROL XL) extended release tablet 50 mg  50 mg Oral Daily Jose Enrique Diez MD   50 mg at 03/24/24 0908    tamsulosin (FLOMAX) capsule 0.4 mg  0.4 mg Oral Daily Jose Enrique Diez MD   0.4 mg at 03/24/24 0908    sodium chloride flush 0.9 % injection 5-40 mL  5-40 mL IntraVENous 2 times per day Jose Enrique Diez MD   10 mL at 03/24/24 2243    sodium chloride flush 0.9 % injection 5-40 mL  5-40 mL IntraVENous PRN Jose Enrique Diez MD        0.9 % sodium chloride infusion   IntraVENous PRN Jose Enrique Diez MD        ondansetron (ZOFRAN-ODT) disintegrating tablet 4 mg  4 mg Oral Q8H PRN Jose Enrique Diez MD        Or    ondansetron (ZOFRAN) injection 4 mg  4 mg IntraVENous Q6H PRN Jose Enrique Diez MD        polyethylene glycol (GLYCOLAX) packet 17 g  17 g Oral Daily PRN Jose Enrique Diez MD        enoxaparin (LOVENOX) injection 40 mg  40 mg SubCUTAneous Daily Jose Enrique Diez MD   40 mg at 03/24/24 0908    aspirin tablet 325 mg  325 mg Oral Daily Kaz Awan, 
  12/28/23 94.2 kg (207 lb 9.6 oz)         Intake/Output Summary (Last 24 hours) at 3/30/2024 1343  Last data filed at 3/30/2024 1300  Gross per 24 hour   Intake 2666.3 ml   Output 2525 ml   Net 141.3 ml         Patient seen and examined. Chart reviewed. Labs, data and other pertinent notes reviewed in last 24 hrs.    PMH/SH/FH reviewed and unchanged compared to H&P    Discussed with patient and ICU RN      Jose Cruz Hernandez MD  [facility] ([facility])             
notified  COMMUNICATION/EDUCATION:   Discussed results and recommendations with patient and family. He demonstrated Excellent understanding as evidenced by Verbalizing understanding.  The patient's plan of care including recommendations, planned interventions, and recommended diet changes were discussed with: Registered nurse  Patient/family have participated as able in goal setting and plan of care    Thank you,  Jonna Elaine M.Ed, CCC-SLP  Speech Language Pathologist  Minutes: 27   
pulses  Neuro/Psych: pleasant mood and affect, CN 2-12 grossly intact, sensory grossly within normal limit, Strength 5/5 in all extremities, DTR 1+ x 4  Skin: warm     Data Review:    Review and/or order of clinical lab test  Review and/or order of tests in the radiology section of CPT  Review and/or order of tests in the medicine section of CPT      I have personally and independently reviewed all pertinent labs, diagnostic studies, imaging, and have provided independent interpretation of the same.     Labs:     Recent Labs     03/26/24 0217   WBC 7.4   HGB 14.9   HCT 47.1          Recent Labs     03/26/24 0216 03/26/24 0217 03/27/24 0542 03/28/24 0417   NA  --  134* 136 138   K  --  4.8 4.9 4.6   CL  --  106 109* 111*   CO2  --  23 21 22   BUN  --  15 20 20   MG 2.1  --  2.0 1.9   PHOS 3.0  --  3.2 3.2       Recent Labs     03/26/24 0217 03/27/24 0542 03/28/24 0417   ALT 12 12 14   GLOB 3.4 3.0 3.1       No results for input(s): \"INR\", \"APTT\" in the last 72 hours.    Invalid input(s): \"PTP\"     No results for input(s): \"TIBC\", \"FERR\" in the last 72 hours.    Invalid input(s): \"FE\", \"PSAT\"   No results found for: \"FOL\", \"RBCF\"   No results for input(s): \"PH\", \"PCO2\", \"PO2\" in the last 72 hours.  No results for input(s): \"CPK\" in the last 72 hours.    Invalid input(s): \"CPKMB\", \"CKNDX\", \"TROIQ\"  Lab Results   Component Value Date/Time    CHOL 112 03/24/2024 09:47 AM    HDL 48 03/24/2024 09:47 AM     No results found for: \"GLUCPOC\"  [unfilled]    Notes reviewed from all clinical/nonclinical/nursing services involved in patient's clinical care. Care coordination discussions were held with appropriate clinical/nonclinical/ nursing providers based on care coordination needs.         Patients current active Medications were reviewed, considered, added and adjusted based on the clinical condition today.      Home Medications were reconciled to the best of my ability given all available resources at the time of 
day    sodium chloride flush 0.9 % injection 5-40 mL  5-40 mL IntraVENous PRN    0.9 % sodium chloride infusion   IntraVENous PRN    ondansetron (ZOFRAN-ODT) disintegrating tablet 4 mg  4 mg Oral Q8H PRN    Or    ondansetron (ZOFRAN) injection 4 mg  4 mg IntraVENous Q6H PRN    polyethylene glycol (GLYCOLAX) packet 17 g  17 g Oral Daily PRN    enoxaparin (LOVENOX) injection 40 mg  40 mg SubCUTAneous Daily    0.9 % sodium chloride infusion   IntraVENous Continuous    [START ON 3/25/2024] aspirin tablet 325 mg  325 mg Oral Daily    [START ON 3/25/2024] clopidogrel (PLAVIX) tablet 75 mg  75 mg Oral Daily    atorvastatin (LIPITOR) tablet 40 mg  40 mg Oral Daily     ______________________________________________________________________  EXPECTED LENGTH OF STAY: Unable to retrieve estimated LOS  ACTUAL LENGTH OF STAY:          0                 Radha Knowles MD     
internal carotid artery: Calcified and noncalcified plaque with  50% stenosis by NASCET criteria.    Right vertebral artery: Patent    Left vertebral artery: Mild calcified plaque in the proximal left vertebral  artery.    The lung apices are clear. The thyroid is homogeneous. No cervical  lymphadenopathy.    CTA HEAD:    Right cavernous internal carotid artery: Patent    Left cavernous internal carotid artery: Patent    Anterior cerebral arteries: Patent    Anterior communicating artery: Patent    Right middle cerebral artery: Patent    Left middle cerebral artery: Patent    Posterior communicating arteries: Diminutive    Posterior cerebral arteries: Patent    Basilar artery: Patent    Distal vertebral arteries: Patent        CT Perfusion:  Normal CT perfusion.    Impression  1.  Severe right ICA stenosis.  2.  No perfusion abnormality or intracranial large vessel occlusion.  3.  No significant intracranial atherosclerosis.      Care Plan discussed with:  Patient x   Family x   RN    Care Manager    Consultant/Specialist:       Signed:AIXA Hall NP

## 2024-04-01 NOTE — DISCHARGE INSTRUCTIONS
Discharge Instructions       PATIENT ID: Yosef Ruvalcaba  MRN: [id number]   YOB: 1935    DATE OF ADMISSION: 3/23/2024   DATE OF DISCHARGE: 4/1/2024    PRIMARY CARE PROVIDER: Filipe Johnston     ATTENDING PHYSICIAN: Lana Ervin MD   DISCHARGING PROVIDER: Lana Ervin MD    To contact this individual call [phone] and ask the  to page.   If unavailable ask to be transferred the Adult Hospitalist Department.    DISCHARGE DIAGNOSES #severe R ICA stenosis s/p stent on 3/29  #R MCA stroke    CONSULTATIONS: [unfilled]    PROCEDURES/SURGERIES: * No surgery found *    PENDING TEST RESULTS:   At the time of discharge the following test results are still pending:     [facility]   [facility]     ADDITIONAL CARE RECOMMENDATIONS:     DIET: cardiac diet      ACTIVITY: activity as tolerated    WOUND CARE:     EQUIPMENT needed:       DISCHARGE MEDICATIONS:   See Medication Reconciliation Form    It is important that you take the medication exactly as they are prescribed.   Keep your medication in the bottles provided by the pharmacist and keep a list of the medication names, dosages, and times to be taken in your wallet.   Do not take other medications without consulting your doctor.       NOTIFY YOUR PHYSICIAN FOR ANY OF THE FOLLOWING:   Fever over 101 degrees for 24 hours.   Chest pain, shortness of breath, fever, chills, nausea, vomiting, diarrhea, change in mentation, falling, weakness, bleeding. Severe pain or pain not relieved by medications.  Or, any other signs or symptoms that you may have questions about.      DISPOSITION:   x Home With:   OT  PT  HH  RN       SNF/Inpatient Rehab/LTAC    Independent/assisted living    Hospice    Other:     CDMP Checked:   Yes x     PROBLEM LIST Updated:  Yes x       Signed:   Lana Ervin MD  4/1/2024  10:44 AM

## 2024-04-16 ENCOUNTER — TELEMEDICINE (OUTPATIENT)
Age: 89
End: 2024-04-16
Payer: MEDICARE

## 2024-04-16 ENCOUNTER — TELEPHONE (OUTPATIENT)
Age: 89
End: 2024-04-16

## 2024-04-16 DIAGNOSIS — I63.9 ACUTE CVA (CEREBROVASCULAR ACCIDENT) (HCC): Primary | ICD-10-CM

## 2024-04-16 DIAGNOSIS — N18.30 STAGE 3 CHRONIC KIDNEY DISEASE, UNSPECIFIED WHETHER STAGE 3A OR 3B CKD (HCC): ICD-10-CM

## 2024-04-16 DIAGNOSIS — R97.20 ELEVATED PSA: ICD-10-CM

## 2024-04-16 DIAGNOSIS — Z86.73 HISTORY OF STROKE: ICD-10-CM

## 2024-04-16 DIAGNOSIS — N40.0 BENIGN PROSTATIC HYPERPLASIA, UNSPECIFIED WHETHER LOWER URINARY TRACT SYMPTOMS PRESENT: ICD-10-CM

## 2024-04-16 DIAGNOSIS — Z09 HOSPITAL DISCHARGE FOLLOW-UP: ICD-10-CM

## 2024-04-16 PROCEDURE — 1111F DSCHRG MED/CURRENT MED MERGE: CPT | Performed by: INTERNAL MEDICINE

## 2024-04-16 PROCEDURE — 1123F ACP DISCUSS/DSCN MKR DOCD: CPT | Performed by: INTERNAL MEDICINE

## 2024-04-16 PROCEDURE — G8427 DOCREV CUR MEDS BY ELIG CLIN: HCPCS | Performed by: INTERNAL MEDICINE

## 2024-04-16 PROCEDURE — 99214 OFFICE O/P EST MOD 30 MIN: CPT | Performed by: INTERNAL MEDICINE

## 2024-04-16 ASSESSMENT — PATIENT HEALTH QUESTIONNAIRE - PHQ9
SUM OF ALL RESPONSES TO PHQ QUESTIONS 1-9: 0
1. LITTLE INTEREST OR PLEASURE IN DOING THINGS: NOT AT ALL
SUM OF ALL RESPONSES TO PHQ9 QUESTIONS 1 & 2: 0
SUM OF ALL RESPONSES TO PHQ QUESTIONS 1-9: 0
2. FEELING DOWN, DEPRESSED OR HOPELESS: NOT AT ALL

## 2024-04-16 NOTE — TELEPHONE ENCOUNTER
Spoke with patient daughterLilia. Pt had stroke 2 weeks ago. Discharged home last Monday. Daughter requesting physical therapy.   Was offered PT, HH upon discharge but daughter declined.     Pt can walk but c/o legs being weak. Pt working with hands.  Using silicon balls to squeeze and using 1lb weights in hands.   Daughter wants PT to help strengthen pt whole body.     Please advise.

## 2024-04-16 NOTE — PROGRESS NOTES
Chief Complaint   Patient presents with    Follow-Up from Hospital     Patient admitted to [facility] on 3/23/24 for L arm weakness and CVA.        \"Have you been to the ER, urgent care clinic since your last visit?  Hospitalized since your last visit?\"    YES - When: approximately 4  weeks ago.  Where and Why: [facility] for L arm weakness and CVA.    “Have you seen or consulted any other health care providers outside of [facility] since your last visit?”    NO            Click Here for Release of Records Request

## 2024-04-16 NOTE — TELEPHONE ENCOUNTER
----- Message from Amaris Shen sent at 4/15/2024  4:19 PM EDT -----  Subject: Referral Request    Reason for referral request? Patient had a stroke 2 weeks ago and his   daughter, Lilia Ruvalcaba, thinks he needs physical therapy and would like   a referral for one nearby. Please leave a number for her to call back once   the referral has been sent. Thanks.  Provider patient wants to be referred to(if known):     Provider Phone Number(if known):    Additional Information for Provider? Please call Lilia once the physical   therapy referral has been placed and leave a call back number. Thanks.  ---------------------------------------------------------------------------  --------------  CALL BACK INFO    [phone]; OK to leave message on voicemail  ---------------------------------------------------------------------------  --------------

## 2024-04-16 NOTE — PROGRESS NOTES
HISTORY OF PRESENT ILLNESS   Yosef Ruvalcaba   is a 88 y.o.  male.    Yosef Ruvalcaba, was evaluated through a synchronous (real-time) audio-video encounter. The patient (or guardian if applicable) is aware that this is a billable service, which includes applicable co-pays. This Virtual Visit was conducted with patient's (and/or legal guardian's) consent. Patient identification was verified, and a caregiver was present when appropriate.   The patient was located at Home: [address] [place] [id number]  Provider was located at Facility (Appt Dept): [address] [facility]  [address]  [place] [address]  Confirm you are appropriately licensed, registered, or certified to deliver care in the state where the patient is located as indicated above. If you are not or unsure, please re-schedule the visit: Yes, I confirm.        Total time spent for this encounter: Not billed by time    --Filipe Johnston MD on 4/16/2024 at 3:47 PM    An electronic signature was used to authenticate this note.  Hx htn hld cad s/p cabg 1990'a, lvot obstruction obesity ckd 3     Here for hospital fu acute CVA with right arm weakness    S/p stent right iCA for severe stenosis  On brinlinanad aspirin  No change in right hadn weakness. Some difficult lifting with right arm  Daughter request outpt PT to help with right arm weakness and ? Right leg weakness  Able to walk unassisted short distances  No acute neuro changes    Saw Dr Lucas NOVAK and prostate MRI was recommended for elevated PSA  Frazier removed prior to discharge and voiding ok  DATE OF ADMISSION: 3/23/2024  5:07 PM    DATE OF DISCHARGE: 4/1/24   PRIMARY CARE PROVIDER: Filipe Johnston MD      ATTENDING PHYSICIAN: lana ervin  DISCHARGING PROVIDER: Lana Ervin MD    To contact this individual call [phone] and ask the  to page.  If unavailable ask to be transferred the Adult Hospitalist Department.     CONSULTATIONS: IP CONSULT TO TELE-NEUROLOGY  IP CONSULT TO

## 2024-04-18 ENCOUNTER — TELEPHONE (OUTPATIENT)
Age: 89
End: 2024-04-18

## 2024-04-18 NOTE — TELEPHONE ENCOUNTER
[place], Patient information , [facility]    Phone [phone] direct  Fax  [phone]    States:  States needs medicare number as only received supplemental insurance info and they cannot proceed without medicare number.      Please fax            Appointments:  Last seen by PCP:   4/16/2024   Last seen at [facility]:   4/16/2024   Future appointment [facility]:  7/2/2024                     Caller confirms readback of documented phone/fax number(s) as correct.

## 2024-04-27 ENCOUNTER — APPOINTMENT (OUTPATIENT)
Facility: HOSPITAL | Age: 89
DRG: 871 | End: 2024-04-27
Payer: MEDICARE

## 2024-04-27 ENCOUNTER — HOSPITAL ENCOUNTER (INPATIENT)
Facility: HOSPITAL | Age: 89
LOS: 3 days | Discharge: HOME | DRG: 871 | End: 2024-04-30
Attending: STUDENT IN AN ORGANIZED HEALTH CARE EDUCATION/TRAINING PROGRAM | Admitting: GENERAL ACUTE CARE HOSPITAL
Payer: MEDICARE

## 2024-04-27 DIAGNOSIS — I21.4 NSTEMI (NON-ST ELEVATED MYOCARDIAL INFARCTION) (HCC): ICD-10-CM

## 2024-04-27 DIAGNOSIS — R29.90 STROKE-LIKE EPISODE: ICD-10-CM

## 2024-04-27 DIAGNOSIS — N30.90 CYSTITIS: Primary | ICD-10-CM

## 2024-04-27 LAB
ALBUMIN SERPL-MCNC: 3 G/DL (ref 3.5–5)
ALBUMIN/GLOB SERPL: 0.8 (ref 1.1–2.2)
ALP SERPL-CCNC: 134 U/L (ref 45–117)
ALT SERPL-CCNC: 18 U/L (ref 12–78)
ANION GAP SERPL CALC-SCNC: 7 MMOL/L (ref 5–15)
APPEARANCE UR: ABNORMAL
APTT PPP: 20.9 SEC (ref 22.1–31)
AST SERPL-CCNC: 20 U/L (ref 15–37)
BACTERIA URNS QL MICRO: ABNORMAL /HPF
BASOPHILS # BLD: 0.1 K/UL (ref 0–0.1)
BASOPHILS NFR BLD: 0 % (ref 0–1)
BILIRUB SERPL-MCNC: 1.1 MG/DL (ref 0.2–1)
BILIRUB UR QL CFM: NEGATIVE
BUN SERPL-MCNC: 27 MG/DL (ref 6–20)
BUN/CREAT SERPL: 13 (ref 12–20)
CALCIUM SERPL-MCNC: 9.4 MG/DL (ref 8.5–10.1)
CHLORIDE SERPL-SCNC: 107 MMOL/L (ref 97–108)
CK SERPL-CCNC: 244 U/L (ref 39–308)
CO2 SERPL-SCNC: 22 MMOL/L (ref 21–32)
COLOR UR: ABNORMAL
CREAT SERPL-MCNC: 2.06 MG/DL (ref 0.7–1.3)
DIFFERENTIAL METHOD BLD: ABNORMAL
EOSINOPHIL # BLD: 0 K/UL (ref 0–0.4)
EOSINOPHIL NFR BLD: 0 % (ref 0–7)
EPITH CASTS URNS QL MICRO: ABNORMAL /LPF
ERYTHROCYTE [DISTWIDTH] IN BLOOD BY AUTOMATED COUNT: 13.8 % (ref 11.5–14.5)
FLUAV AG NPH QL IA: NEGATIVE
FLUBV AG NOSE QL IA: NEGATIVE
GLOBULIN SER CALC-MCNC: 3.7 G/DL (ref 2–4)
GLUCOSE SERPL-MCNC: 114 MG/DL (ref 65–100)
GLUCOSE UR STRIP.AUTO-MCNC: NEGATIVE MG/DL
HCT VFR BLD AUTO: 39.5 % (ref 36.6–50.3)
HGB BLD-MCNC: 12.7 G/DL (ref 12.1–17)
HGB UR QL STRIP: ABNORMAL
IMM GRANULOCYTES # BLD AUTO: 0.2 K/UL (ref 0–0.04)
IMM GRANULOCYTES NFR BLD AUTO: 1 % (ref 0–0.5)
INR PPP: 1.2 (ref 0.9–1.1)
KETONES UR QL STRIP.AUTO: ABNORMAL MG/DL
LEUKOCYTE ESTERASE UR QL STRIP.AUTO: ABNORMAL
LYMPHOCYTES # BLD: 0.8 K/UL (ref 0.8–3.5)
LYMPHOCYTES NFR BLD: 4 % (ref 12–49)
MAGNESIUM SERPL-MCNC: 1.4 MG/DL (ref 1.6–2.4)
MCH RBC QN AUTO: 29.6 PG (ref 26–34)
MCHC RBC AUTO-ENTMCNC: 32.2 G/DL (ref 30–36.5)
MCV RBC AUTO: 92.1 FL (ref 80–99)
MONOCYTES # BLD: 1.6 K/UL (ref 0–1)
MONOCYTES NFR BLD: 9 % (ref 5–13)
NEUTS SEG # BLD: 16.2 K/UL (ref 1.8–8)
NEUTS SEG NFR BLD: 86 % (ref 32–75)
NITRITE UR QL STRIP.AUTO: NEGATIVE
NRBC # BLD: 0 K/UL (ref 0–0.01)
NRBC BLD-RTO: 0 PER 100 WBC
PH UR STRIP: 5.5 (ref 5–8)
PHOSPHATE SERPL-MCNC: 2 MG/DL (ref 2.6–4.7)
PLATELET # BLD AUTO: 185 K/UL (ref 150–400)
PMV BLD AUTO: 11 FL (ref 8.9–12.9)
POTASSIUM SERPL-SCNC: 4.9 MMOL/L (ref 3.5–5.1)
PROT SERPL-MCNC: 6.7 G/DL (ref 6.4–8.2)
PROT UR STRIP-MCNC: 30 MG/DL
PROTHROMBIN TIME: 12.6 SEC (ref 9–11.1)
RBC # BLD AUTO: 4.29 M/UL (ref 4.1–5.7)
RBC #/AREA URNS HPF: ABNORMAL /HPF (ref 0–5)
SARS-COV-2 RDRP RESP QL NAA+PROBE: NOT DETECTED
SODIUM SERPL-SCNC: 136 MMOL/L (ref 136–145)
SOURCE: NORMAL
SP GR UR REFRACTOMETRY: 1.02
THERAPEUTIC RANGE: ABNORMAL SECS (ref 58–77)
TROPONIN I SERPL HS-MCNC: 394 NG/L (ref 0–76)
TROPONIN I SERPL HS-MCNC: 498 NG/L (ref 0–76)
UFH PPP CHRO-ACNC: <0.1 IU/ML
URINE CULTURE IF INDICATED: ABNORMAL
UROBILINOGEN UR QL STRIP.AUTO: 1 EU/DL (ref 0.2–1)
WBC # BLD AUTO: 18.9 K/UL (ref 4.1–11.1)
WBC URNS QL MICRO: ABNORMAL /HPF (ref 0–4)

## 2024-04-27 PROCEDURE — 96365 THER/PROPH/DIAG IV INF INIT: CPT

## 2024-04-27 PROCEDURE — 87804 INFLUENZA ASSAY W/OPTIC: CPT

## 2024-04-27 PROCEDURE — 82550 ASSAY OF CK (CPK): CPT

## 2024-04-27 PROCEDURE — 84100 ASSAY OF PHOSPHORUS: CPT

## 2024-04-27 PROCEDURE — 87040 BLOOD CULTURE FOR BACTERIA: CPT

## 2024-04-27 PROCEDURE — 2580000003 HC RX 258: Performed by: GENERAL ACUTE CARE HOSPITAL

## 2024-04-27 PROCEDURE — 81001 URINALYSIS AUTO W/SCOPE: CPT

## 2024-04-27 PROCEDURE — 70450 CT HEAD/BRAIN W/O DYE: CPT

## 2024-04-27 PROCEDURE — 87186 SC STD MICRODIL/AGAR DIL: CPT

## 2024-04-27 PROCEDURE — 85025 COMPLETE CBC W/AUTO DIFF WBC: CPT

## 2024-04-27 PROCEDURE — 83735 ASSAY OF MAGNESIUM: CPT

## 2024-04-27 PROCEDURE — 6370000000 HC RX 637 (ALT 250 FOR IP): Performed by: GENERAL ACUTE CARE HOSPITAL

## 2024-04-27 PROCEDURE — 6360000002 HC RX W HCPCS: Performed by: STUDENT IN AN ORGANIZED HEALTH CARE EDUCATION/TRAINING PROGRAM

## 2024-04-27 PROCEDURE — 99285 EMERGENCY DEPT VISIT HI MDM: CPT

## 2024-04-27 PROCEDURE — 85520 HEPARIN ASSAY: CPT

## 2024-04-27 PROCEDURE — 85730 THROMBOPLASTIN TIME PARTIAL: CPT

## 2024-04-27 PROCEDURE — 76770 US EXAM ABDO BACK WALL COMP: CPT

## 2024-04-27 PROCEDURE — 36415 COLL VENOUS BLD VENIPUNCTURE: CPT

## 2024-04-27 PROCEDURE — 87635 SARS-COV-2 COVID-19 AMP PRB: CPT

## 2024-04-27 PROCEDURE — 71045 X-RAY EXAM CHEST 1 VIEW: CPT

## 2024-04-27 PROCEDURE — 87088 URINE BACTERIA CULTURE: CPT

## 2024-04-27 PROCEDURE — 84484 ASSAY OF TROPONIN QUANT: CPT

## 2024-04-27 PROCEDURE — 6370000000 HC RX 637 (ALT 250 FOR IP): Performed by: STUDENT IN AN ORGANIZED HEALTH CARE EDUCATION/TRAINING PROGRAM

## 2024-04-27 PROCEDURE — 2580000003 HC RX 258: Performed by: STUDENT IN AN ORGANIZED HEALTH CARE EDUCATION/TRAINING PROGRAM

## 2024-04-27 PROCEDURE — 85610 PROTHROMBIN TIME: CPT

## 2024-04-27 PROCEDURE — 2060000000 HC ICU INTERMEDIATE R&B

## 2024-04-27 PROCEDURE — 87086 URINE CULTURE/COLONY COUNT: CPT

## 2024-04-27 PROCEDURE — 80053 COMPREHEN METABOLIC PANEL: CPT

## 2024-04-27 PROCEDURE — 93005 ELECTROCARDIOGRAM TRACING: CPT | Performed by: STUDENT IN AN ORGANIZED HEALTH CARE EDUCATION/TRAINING PROGRAM

## 2024-04-27 PROCEDURE — 0202U NFCT DS 22 TRGT SARS-COV-2: CPT

## 2024-04-27 RX ORDER — 0.9 % SODIUM CHLORIDE 0.9 %
30 INTRAVENOUS SOLUTION INTRAVENOUS ONCE
Status: DISCONTINUED | OUTPATIENT
Start: 2024-04-27 | End: 2024-04-27

## 2024-04-27 RX ORDER — 0.9 % SODIUM CHLORIDE 0.9 %
1600 INTRAVENOUS SOLUTION INTRAVENOUS ONCE
Status: DISCONTINUED | OUTPATIENT
Start: 2024-04-27 | End: 2024-04-30 | Stop reason: HOSPADM

## 2024-04-27 RX ORDER — ONDANSETRON 2 MG/ML
4 INJECTION INTRAMUSCULAR; INTRAVENOUS EVERY 6 HOURS PRN
Status: DISCONTINUED | OUTPATIENT
Start: 2024-04-27 | End: 2024-04-30 | Stop reason: HOSPADM

## 2024-04-27 RX ORDER — SODIUM CHLORIDE 9 MG/ML
INJECTION, SOLUTION INTRAVENOUS CONTINUOUS
Status: DISCONTINUED | OUTPATIENT
Start: 2024-04-27 | End: 2024-04-30

## 2024-04-27 RX ORDER — ACETAMINOPHEN 650 MG/1
650 SUPPOSITORY RECTAL EVERY 6 HOURS PRN
Status: DISCONTINUED | OUTPATIENT
Start: 2024-04-27 | End: 2024-04-30 | Stop reason: HOSPADM

## 2024-04-27 RX ORDER — ASPIRIN 81 MG/1
324 TABLET, CHEWABLE ORAL ONCE
Status: COMPLETED | OUTPATIENT
Start: 2024-04-27 | End: 2024-04-27

## 2024-04-27 RX ORDER — ACETAMINOPHEN 325 MG/1
650 TABLET ORAL EVERY 6 HOURS PRN
Status: DISCONTINUED | OUTPATIENT
Start: 2024-04-27 | End: 2024-04-30 | Stop reason: HOSPADM

## 2024-04-27 RX ORDER — ASPIRIN 81 MG/1
81 TABLET ORAL DAILY
Status: DISCONTINUED | OUTPATIENT
Start: 2024-04-27 | End: 2024-04-30 | Stop reason: HOSPADM

## 2024-04-27 RX ORDER — METOPROLOL SUCCINATE 50 MG/1
50 TABLET, EXTENDED RELEASE ORAL DAILY
Status: DISCONTINUED | OUTPATIENT
Start: 2024-04-27 | End: 2024-04-28

## 2024-04-27 RX ORDER — SODIUM CHLORIDE 0.9 % (FLUSH) 0.9 %
5-40 SYRINGE (ML) INJECTION EVERY 12 HOURS SCHEDULED
Status: DISCONTINUED | OUTPATIENT
Start: 2024-04-27 | End: 2024-04-30 | Stop reason: HOSPADM

## 2024-04-27 RX ORDER — ENOXAPARIN SODIUM 100 MG/ML
30 INJECTION SUBCUTANEOUS DAILY
Status: DISCONTINUED | OUTPATIENT
Start: 2024-04-28 | End: 2024-04-28

## 2024-04-27 RX ORDER — SODIUM CHLORIDE 9 MG/ML
INJECTION, SOLUTION INTRAVENOUS PRN
Status: DISCONTINUED | OUTPATIENT
Start: 2024-04-27 | End: 2024-04-30 | Stop reason: HOSPADM

## 2024-04-27 RX ORDER — HEPARIN SODIUM 1000 [USP'U]/ML
4000 INJECTION, SOLUTION INTRAVENOUS; SUBCUTANEOUS PRN
Status: DISCONTINUED | OUTPATIENT
Start: 2024-04-27 | End: 2024-04-27 | Stop reason: ALTCHOICE

## 2024-04-27 RX ORDER — TAMSULOSIN HYDROCHLORIDE 0.4 MG/1
0.4 CAPSULE ORAL DAILY
Status: DISCONTINUED | OUTPATIENT
Start: 2024-04-27 | End: 2024-04-30 | Stop reason: HOSPADM

## 2024-04-27 RX ORDER — ENOXAPARIN SODIUM 100 MG/ML
30 INJECTION SUBCUTANEOUS DAILY
Status: DISCONTINUED | OUTPATIENT
Start: 2024-04-27 | End: 2024-04-27

## 2024-04-27 RX ORDER — HEPARIN SODIUM 10000 [USP'U]/100ML
5-30 INJECTION, SOLUTION INTRAVENOUS CONTINUOUS
Status: DISCONTINUED | OUTPATIENT
Start: 2024-04-27 | End: 2024-04-27

## 2024-04-27 RX ORDER — FINASTERIDE 5 MG/1
5 TABLET, FILM COATED ORAL DAILY
Status: DISCONTINUED | OUTPATIENT
Start: 2024-04-27 | End: 2024-04-30 | Stop reason: HOSPADM

## 2024-04-27 RX ORDER — SODIUM CHLORIDE 0.9 % (FLUSH) 0.9 %
5-40 SYRINGE (ML) INJECTION PRN
Status: DISCONTINUED | OUTPATIENT
Start: 2024-04-27 | End: 2024-04-30 | Stop reason: HOSPADM

## 2024-04-27 RX ORDER — ATORVASTATIN CALCIUM 40 MG/1
40 TABLET, FILM COATED ORAL DAILY
Status: DISCONTINUED | OUTPATIENT
Start: 2024-04-27 | End: 2024-04-29

## 2024-04-27 RX ORDER — POLYETHYLENE GLYCOL 3350 17 G/17G
17 POWDER, FOR SOLUTION ORAL DAILY PRN
Status: DISCONTINUED | OUTPATIENT
Start: 2024-04-27 | End: 2024-04-30 | Stop reason: HOSPADM

## 2024-04-27 RX ORDER — HEPARIN SODIUM 1000 [USP'U]/ML
2000 INJECTION, SOLUTION INTRAVENOUS; SUBCUTANEOUS PRN
Status: DISCONTINUED | OUTPATIENT
Start: 2024-04-27 | End: 2024-04-27 | Stop reason: ALTCHOICE

## 2024-04-27 RX ORDER — 0.9 % SODIUM CHLORIDE 0.9 %
1000 INTRAVENOUS SOLUTION INTRAVENOUS ONCE
Status: COMPLETED | OUTPATIENT
Start: 2024-04-27 | End: 2024-04-27

## 2024-04-27 RX ORDER — LANOLIN ALCOHOL/MO/W.PET/CERES
400 CREAM (GRAM) TOPICAL 2 TIMES DAILY
Status: DISCONTINUED | OUTPATIENT
Start: 2024-04-27 | End: 2024-04-30 | Stop reason: HOSPADM

## 2024-04-27 RX ORDER — MAGNESIUM SULFATE 1 G/100ML
1000 INJECTION INTRAVENOUS ONCE
Status: DISCONTINUED | OUTPATIENT
Start: 2024-04-27 | End: 2024-04-30 | Stop reason: HOSPADM

## 2024-04-27 RX ORDER — FOLIC ACID 1 MG/1
1 TABLET ORAL DAILY
Status: DISCONTINUED | OUTPATIENT
Start: 2024-04-27 | End: 2024-04-30 | Stop reason: HOSPADM

## 2024-04-27 RX ORDER — ONDANSETRON 4 MG/1
4 TABLET, ORALLY DISINTEGRATING ORAL EVERY 8 HOURS PRN
Status: DISCONTINUED | OUTPATIENT
Start: 2024-04-27 | End: 2024-04-30 | Stop reason: HOSPADM

## 2024-04-27 RX ADMIN — SODIUM CHLORIDE, PRESERVATIVE FREE 10 ML: 5 INJECTION INTRAVENOUS at 22:11

## 2024-04-27 RX ADMIN — SODIUM CHLORIDE: 9 INJECTION, SOLUTION INTRAVENOUS at 22:10

## 2024-04-27 RX ADMIN — HEPARIN SODIUM AND DEXTROSE 11 UNITS/KG/HR: 10000; 5 INJECTION INTRAVENOUS at 18:21

## 2024-04-27 RX ADMIN — Medication 400 MG: at 21:47

## 2024-04-27 RX ADMIN — SODIUM CHLORIDE, PRESERVATIVE FREE 10 ML: 5 INJECTION INTRAVENOUS at 22:12

## 2024-04-27 RX ADMIN — SODIUM CHLORIDE 1000 ML: 9 INJECTION, SOLUTION INTRAVENOUS at 16:09

## 2024-04-27 RX ADMIN — CEFTRIAXONE SODIUM 2000 MG: 2 INJECTION, POWDER, FOR SOLUTION INTRAMUSCULAR; INTRAVENOUS at 17:33

## 2024-04-27 RX ADMIN — ACETAMINOPHEN 650 MG: 325 TABLET ORAL at 18:03

## 2024-04-27 RX ADMIN — ASPIRIN 324 MG: 81 TABLET, CHEWABLE ORAL at 18:03

## 2024-04-27 RX ADMIN — TICAGRELOR 30 MG: 60 TABLET ORAL at 21:46

## 2024-04-27 ASSESSMENT — LIFESTYLE VARIABLES
HOW OFTEN DO YOU HAVE A DRINK CONTAINING ALCOHOL: NEVER
HOW MANY STANDARD DRINKS CONTAINING ALCOHOL DO YOU HAVE ON A TYPICAL DAY: PATIENT DOES NOT DRINK

## 2024-04-27 ASSESSMENT — PAIN SCALES - GENERAL
PAINLEVEL_OUTOF10: 0
PAINLEVEL_OUTOF10: 0

## 2024-04-27 NOTE — ED NOTES
This RN notified Best MD at this time that pt has had a condition change, pt is shaking vigorously with chills - working on obtaining a rectal temp as pt's skin feels hot.

## 2024-04-27 NOTE — ED PROVIDER NOTES
04/27/24  2:41 PM   Result Value Ref Range    Sodium 136 136 - 145 mmol/L    Potassium 4.9 3.5 - 5.1 mmol/L    Chloride 107 97 - 108 mmol/L    CO2 22 21 - 32 mmol/L    Anion Gap 7 5 - 15 mmol/L    Glucose 114 (H) 65 - 100 mg/dL    BUN 27 (H) 6 - 20 MG/DL    Creatinine 2.06 (H) 0.70 - 1.30 MG/DL    Bun/Cre Ratio 13 12 - 20      Est, Glom Filt Rate 31 (L) >60 ml/min/1.73m2    Calcium 9.4 8.5 - 10.1 MG/DL    Total Bilirubin 1.1 (H) 0.2 - 1.0 MG/DL    ALT 18 12 - 78 U/L    AST 20 15 - 37 U/L    Alk Phosphatase 134 (H) 45 - 117 U/L    Total Protein 6.7 6.4 - 8.2 g/dL    Albumin 3.0 (L) 3.5 - 5.0 g/dL    Globulin 3.7 2.0 - 4.0 g/dL    Albumin/Globulin Ratio 0.8 (L) 1.1 - 2.2     Magnesium    Collection Time: 04/27/24  2:41 PM   Result Value Ref Range    Magnesium 1.4 (L) 1.6 - 2.4 mg/dL   Phosphorus    Collection Time: 04/27/24  2:41 PM   Result Value Ref Range    Phosphorus 2.0 (L) 2.6 - 4.7 MG/DL   Troponin    Collection Time: 04/27/24  2:41 PM   Result Value Ref Range    Troponin, High Sensitivity 394 (HH) 0 - 76 ng/L   Urinalysis with Reflex to Culture    Collection Time: 04/27/24  4:09 PM    Specimen: Miscellaneous sample    Urine specimen   Result Value Ref Range    Color, UA DARK YELLOW      Appearance CLOUDY (A) CLEAR      Specific Gravity, UA 1.023      pH, Urine 5.5 5.0 - 8.0      Protein, UA 30 (A) NEG mg/dL    Glucose, UA Negative NEG mg/dL    Ketones, Urine TRACE (A) NEG mg/dL    Blood, Urine LARGE (A) NEG      Urobilinogen, Urine 1.0 0.2 - 1.0 EU/dL    Nitrite, Urine Negative NEG      Leukocyte Esterase, Urine MODERATE (A) NEG      WBC, UA 10-20 0 - 4 /hpf    RBC, UA 20-50 0 - 5 /hpf    Epithelial Cells UA FEW FEW /lpf    BACTERIA, URINE 4+ (A) NEG /hpf    Urine Culture if Indicated URINE CULTURE ORDERED (A) CNI     Bilirubin, Confirmatory    Collection Time: 04/27/24  4:09 PM   Result Value Ref Range    Bilirubin Confirmation, UA Negative NEG     Troponin    Collection Time: 04/27/24  4:14 PM   Result Value

## 2024-04-27 NOTE — ED NOTES
This RN assumed care of pt at this time. Received SBAR report from Meredith WADDELL. Pt A&Ox4, lying comfortably in bed, family at bedside, call light within reach.

## 2024-04-27 NOTE — ED NOTES
This RN called pharmacy and spoke with Xiomara at this time to confirm pt will start heparin at 11u/kg/hr.

## 2024-04-27 NOTE — ED NOTES
This RN gave SBAR report to BAIRON Gonsalves at this time. Offered oncoming RN the opportunity to ask any questions. This RN stated pertinent pt information, additionally informed oncoming RN of tasks that still need to be completed. Pt's bed locked & in lowest position and call light w/in reach. Emergency equipment at bedside.

## 2024-04-27 NOTE — ED NOTES
Pt has required ultrasound IVs as pt was stuck x3 times without success for normal peripheral IV access. This RN had two attempts obtaining ultrasound IV as pt is a difficult stick. Per MD's orders, cultures need to be obtained prior to starting antibiotics. This RN consulted Karly Garrido RN as we have been told not to obtain cultures from ultrasound IVs. Karly WADDELL advised me to consult ordering MD. This RN spoke with Serjio ROBISON who states \"yes, pull blood cultures from ultrasound IV if that is the only access we have\". This RN followed Best MD's orders and pulled blood cultures from pt's ultrasound lines at this time. To obtain second set of cultures, this RN started pt's second ultrasound IV at this time.

## 2024-04-28 LAB
ALBUMIN SERPL-MCNC: 2.8 G/DL (ref 3.5–5)
ALBUMIN/GLOB SERPL: 0.8 (ref 1.1–2.2)
ALP SERPL-CCNC: 120 U/L (ref 45–117)
ALT SERPL-CCNC: 18 U/L (ref 12–78)
ANION GAP SERPL CALC-SCNC: 4 MMOL/L (ref 5–15)
ANION GAP SERPL CALC-SCNC: 6 MMOL/L (ref 5–15)
APTT PPP: 22.5 SEC (ref 22.1–31)
AST SERPL-CCNC: 25 U/L (ref 15–37)
B PERT DNA SPEC QL NAA+PROBE: NOT DETECTED
BASOPHILS # BLD: 0.1 K/UL (ref 0–0.1)
BASOPHILS NFR BLD: 1 % (ref 0–1)
BILIRUB SERPL-MCNC: 1 MG/DL (ref 0.2–1)
BORDETELLA PARAPERTUSSIS BY PCR: NOT DETECTED
BUN SERPL-MCNC: 29 MG/DL (ref 6–20)
BUN SERPL-MCNC: 29 MG/DL (ref 6–20)
BUN/CREAT SERPL: 17 (ref 12–20)
BUN/CREAT SERPL: 18 (ref 12–20)
C PNEUM DNA SPEC QL NAA+PROBE: NOT DETECTED
CALCIUM SERPL-MCNC: 8.5 MG/DL (ref 8.5–10.1)
CALCIUM SERPL-MCNC: 8.9 MG/DL (ref 8.5–10.1)
CHLORIDE SERPL-SCNC: 109 MMOL/L (ref 97–108)
CHLORIDE SERPL-SCNC: 110 MMOL/L (ref 97–108)
CHOLEST SERPL-MCNC: 93 MG/DL
CO2 SERPL-SCNC: 22 MMOL/L (ref 21–32)
CO2 SERPL-SCNC: 23 MMOL/L (ref 21–32)
CREAT SERPL-MCNC: 1.61 MG/DL (ref 0.7–1.3)
CREAT SERPL-MCNC: 1.7 MG/DL (ref 0.7–1.3)
DIFFERENTIAL METHOD BLD: ABNORMAL
EKG ATRIAL RATE: 91 BPM
EKG DIAGNOSIS: NORMAL
EKG P AXIS: 39 DEGREES
EKG P-R INTERVAL: 226 MS
EKG Q-T INTERVAL: 358 MS
EKG QRS DURATION: 86 MS
EKG QTC CALCULATION (BAZETT): 440 MS
EKG R AXIS: 28 DEGREES
EKG T AXIS: 227 DEGREES
EKG VENTRICULAR RATE: 91 BPM
EOSINOPHIL # BLD: 0 K/UL (ref 0–0.4)
EOSINOPHIL NFR BLD: 0 % (ref 0–7)
ERYTHROCYTE [DISTWIDTH] IN BLOOD BY AUTOMATED COUNT: 13.7 % (ref 11.5–14.5)
ERYTHROCYTE [DISTWIDTH] IN BLOOD BY AUTOMATED COUNT: 13.9 % (ref 11.5–14.5)
EST. AVERAGE GLUCOSE BLD GHB EST-MCNC: 120 MG/DL
FLUAV SUBTYP SPEC NAA+PROBE: NOT DETECTED
FLUBV RNA SPEC QL NAA+PROBE: NOT DETECTED
GLOBULIN SER CALC-MCNC: 3.5 G/DL (ref 2–4)
GLUCOSE SERPL-MCNC: 90 MG/DL (ref 65–100)
GLUCOSE SERPL-MCNC: 99 MG/DL (ref 65–100)
HADV DNA SPEC QL NAA+PROBE: NOT DETECTED
HBA1C MFR BLD: 5.8 % (ref 4–5.6)
HCOV 229E RNA SPEC QL NAA+PROBE: NOT DETECTED
HCOV HKU1 RNA SPEC QL NAA+PROBE: NOT DETECTED
HCOV NL63 RNA SPEC QL NAA+PROBE: NOT DETECTED
HCOV OC43 RNA SPEC QL NAA+PROBE: NOT DETECTED
HCT VFR BLD AUTO: 37.1 % (ref 36.6–50.3)
HCT VFR BLD AUTO: 37.6 % (ref 36.6–50.3)
HDLC SERPL-MCNC: 43 MG/DL
HDLC SERPL: 2.2 (ref 0–5)
HGB BLD-MCNC: 11.7 G/DL (ref 12.1–17)
HGB BLD-MCNC: 11.9 G/DL (ref 12.1–17)
HMPV RNA SPEC QL NAA+PROBE: NOT DETECTED
HPIV1 RNA SPEC QL NAA+PROBE: NOT DETECTED
HPIV2 RNA SPEC QL NAA+PROBE: NOT DETECTED
HPIV3 RNA SPEC QL NAA+PROBE: NOT DETECTED
HPIV4 RNA SPEC QL NAA+PROBE: NOT DETECTED
IMM GRANULOCYTES # BLD AUTO: 0 K/UL (ref 0–0.04)
IMM GRANULOCYTES NFR BLD AUTO: 0 % (ref 0–0.5)
INR PPP: 1.3 (ref 0.9–1.1)
LACTATE SERPL-SCNC: 1.3 MMOL/L (ref 0.4–2)
LDLC SERPL CALC-MCNC: 38 MG/DL (ref 0–100)
LYMPHOCYTES # BLD: 1.1 K/UL (ref 0.8–3.5)
LYMPHOCYTES NFR BLD: 11 % (ref 12–49)
M PNEUMO DNA SPEC QL NAA+PROBE: NOT DETECTED
MAGNESIUM SERPL-MCNC: 1.4 MG/DL (ref 1.6–2.4)
MAGNESIUM SERPL-MCNC: 1.4 MG/DL (ref 1.6–2.4)
MCH RBC QN AUTO: 28.5 PG (ref 26–34)
MCH RBC QN AUTO: 29.2 PG (ref 26–34)
MCHC RBC AUTO-ENTMCNC: 31.5 G/DL (ref 30–36.5)
MCHC RBC AUTO-ENTMCNC: 31.6 G/DL (ref 30–36.5)
MCV RBC AUTO: 90.5 FL (ref 80–99)
MCV RBC AUTO: 92.2 FL (ref 80–99)
MONOCYTES # BLD: 0.7 K/UL (ref 0–1)
MONOCYTES NFR BLD: 7 % (ref 5–13)
NEUTS BAND NFR BLD MANUAL: 7 %
NEUTS SEG # BLD: 8 K/UL (ref 1.8–8)
NEUTS SEG NFR BLD: 74 % (ref 32–75)
NRBC # BLD: 0 K/UL (ref 0–0.01)
NRBC # BLD: 0 K/UL (ref 0–0.01)
NRBC BLD-RTO: 0 PER 100 WBC
NRBC BLD-RTO: 0 PER 100 WBC
PHOSPHATE SERPL-MCNC: 1.9 MG/DL (ref 2.6–4.7)
PLATELET # BLD AUTO: 137 K/UL (ref 150–400)
PLATELET # BLD AUTO: 145 K/UL (ref 150–400)
PMV BLD AUTO: 10.6 FL (ref 8.9–12.9)
PMV BLD AUTO: 11 FL (ref 8.9–12.9)
POTASSIUM SERPL-SCNC: 4.2 MMOL/L (ref 3.5–5.1)
POTASSIUM SERPL-SCNC: 4.4 MMOL/L (ref 3.5–5.1)
PROT SERPL-MCNC: 6.3 G/DL (ref 6.4–8.2)
PROTHROMBIN TIME: 13.4 SEC (ref 9–11.1)
RBC # BLD AUTO: 4.08 M/UL (ref 4.1–5.7)
RBC # BLD AUTO: 4.1 M/UL (ref 4.1–5.7)
RBC MORPH BLD: ABNORMAL
RSV RNA SPEC QL NAA+PROBE: NOT DETECTED
RV+EV RNA SPEC QL NAA+PROBE: NOT DETECTED
SARS-COV-2 RNA RESP QL NAA+PROBE: NOT DETECTED
SODIUM SERPL-SCNC: 137 MMOL/L (ref 136–145)
SODIUM SERPL-SCNC: 137 MMOL/L (ref 136–145)
THERAPEUTIC RANGE: NORMAL SECS (ref 58–77)
TRIGL SERPL-MCNC: 60 MG/DL
TROPONIN I SERPL HS-MCNC: 613 NG/L (ref 0–76)
TROPONIN I SERPL HS-MCNC: 803 NG/L (ref 0–76)
UFH PPP CHRO-ACNC: <0.1 IU/ML
VLDLC SERPL CALC-MCNC: 12 MG/DL
WBC # BLD AUTO: 11.6 K/UL (ref 4.1–11.1)
WBC # BLD AUTO: 9.9 K/UL (ref 4.1–11.1)

## 2024-04-28 PROCEDURE — 2580000003 HC RX 258: Performed by: GENERAL ACUTE CARE HOSPITAL

## 2024-04-28 PROCEDURE — 85520 HEPARIN ASSAY: CPT

## 2024-04-28 PROCEDURE — 84100 ASSAY OF PHOSPHORUS: CPT

## 2024-04-28 PROCEDURE — 85610 PROTHROMBIN TIME: CPT

## 2024-04-28 PROCEDURE — 85730 THROMBOPLASTIN TIME PARTIAL: CPT

## 2024-04-28 PROCEDURE — 6370000000 HC RX 637 (ALT 250 FOR IP): Performed by: GENERAL ACUTE CARE HOSPITAL

## 2024-04-28 PROCEDURE — 85027 COMPLETE CBC AUTOMATED: CPT

## 2024-04-28 PROCEDURE — 84484 ASSAY OF TROPONIN QUANT: CPT

## 2024-04-28 PROCEDURE — 6360000002 HC RX W HCPCS: Performed by: GENERAL ACUTE CARE HOSPITAL

## 2024-04-28 PROCEDURE — 97116 GAIT TRAINING THERAPY: CPT

## 2024-04-28 PROCEDURE — 83735 ASSAY OF MAGNESIUM: CPT

## 2024-04-28 PROCEDURE — 97165 OT EVAL LOW COMPLEX 30 MIN: CPT

## 2024-04-28 PROCEDURE — 80053 COMPREHEN METABOLIC PANEL: CPT

## 2024-04-28 PROCEDURE — 2500000003 HC RX 250 WO HCPCS: Performed by: STUDENT IN AN ORGANIZED HEALTH CARE EDUCATION/TRAINING PROGRAM

## 2024-04-28 PROCEDURE — 83036 HEMOGLOBIN GLYCOSYLATED A1C: CPT

## 2024-04-28 PROCEDURE — 2060000000 HC ICU INTERMEDIATE R&B

## 2024-04-28 PROCEDURE — 83605 ASSAY OF LACTIC ACID: CPT

## 2024-04-28 PROCEDURE — 80061 LIPID PANEL: CPT

## 2024-04-28 PROCEDURE — 97535 SELF CARE MNGMENT TRAINING: CPT

## 2024-04-28 PROCEDURE — 99222 1ST HOSP IP/OBS MODERATE 55: CPT | Performed by: INTERNAL MEDICINE

## 2024-04-28 PROCEDURE — 36415 COLL VENOUS BLD VENIPUNCTURE: CPT

## 2024-04-28 PROCEDURE — 97162 PT EVAL MOD COMPLEX 30 MIN: CPT

## 2024-04-28 PROCEDURE — 85025 COMPLETE CBC W/AUTO DIFF WBC: CPT

## 2024-04-28 PROCEDURE — 97530 THERAPEUTIC ACTIVITIES: CPT

## 2024-04-28 RX ORDER — MAGNESIUM SULFATE HEPTAHYDRATE 40 MG/ML
2000 INJECTION, SOLUTION INTRAVENOUS ONCE
Status: COMPLETED | OUTPATIENT
Start: 2024-04-28 | End: 2024-04-28

## 2024-04-28 RX ORDER — ENOXAPARIN SODIUM 100 MG/ML
40 INJECTION SUBCUTANEOUS DAILY
Status: DISCONTINUED | OUTPATIENT
Start: 2024-04-29 | End: 2024-04-30 | Stop reason: HOSPADM

## 2024-04-28 RX ADMIN — CEFTRIAXONE SODIUM 2000 MG: 2 INJECTION, POWDER, FOR SOLUTION INTRAMUSCULAR; INTRAVENOUS at 17:24

## 2024-04-28 RX ADMIN — MAGNESIUM SULFATE HEPTAHYDRATE 2000 MG: 40 INJECTION, SOLUTION INTRAVENOUS at 12:38

## 2024-04-28 RX ADMIN — FINASTERIDE 5 MG: 5 TABLET, FILM COATED ORAL at 08:44

## 2024-04-28 RX ADMIN — TICAGRELOR 30 MG: 60 TABLET ORAL at 08:43

## 2024-04-28 RX ADMIN — TICAGRELOR 30 MG: 60 TABLET ORAL at 22:26

## 2024-04-28 RX ADMIN — ENOXAPARIN SODIUM 30 MG: 100 INJECTION SUBCUTANEOUS at 08:46

## 2024-04-28 RX ADMIN — Medication 400 MG: at 22:25

## 2024-04-28 RX ADMIN — SODIUM CHLORIDE, PRESERVATIVE FREE 10 ML: 5 INJECTION INTRAVENOUS at 10:29

## 2024-04-28 RX ADMIN — FOLIC ACID 1 MG: 1 TABLET ORAL at 08:44

## 2024-04-28 RX ADMIN — SODIUM CHLORIDE: 9 INJECTION, SOLUTION INTRAVENOUS at 08:51

## 2024-04-28 RX ADMIN — ASPIRIN 81 MG: 81 TABLET, COATED ORAL at 08:44

## 2024-04-28 RX ADMIN — SODIUM CHLORIDE, PRESERVATIVE FREE 10 ML: 5 INJECTION INTRAVENOUS at 21:00

## 2024-04-28 RX ADMIN — METOPROLOL SUCCINATE 50 MG: 50 TABLET, EXTENDED RELEASE ORAL at 08:44

## 2024-04-28 RX ADMIN — POTASSIUM & SODIUM PHOSPHATES POWDER PACK 280-160-250 MG 500 MG: 280-160-250 PACK at 08:44

## 2024-04-28 RX ADMIN — Medication 400 MG: at 08:43

## 2024-04-28 RX ADMIN — SODIUM CHLORIDE: 9 INJECTION, SOLUTION INTRAVENOUS at 22:31

## 2024-04-28 RX ADMIN — ATORVASTATIN CALCIUM 40 MG: 40 TABLET, FILM COATED ORAL at 08:44

## 2024-04-28 RX ADMIN — TAMSULOSIN HYDROCHLORIDE 0.4 MG: 0.4 CAPSULE ORAL at 08:44

## 2024-04-28 ASSESSMENT — PAIN SCALES - GENERAL
PAINLEVEL_OUTOF10: 0
PAINLEVEL_OUTOF10: 0

## 2024-04-28 NOTE — PLAN OF CARE
Problem: Discharge Planning  Goal: Discharge to home or other facility with appropriate resources  4/28/2024 1400 by Sun Gaitan RN  Outcome: Progressing  4/28/2024 0658 by Lauryn Guevara RN  Outcome: Progressing     Problem: Pain  Goal: Verbalizes/displays adequate comfort level or baseline comfort level  4/28/2024 1400 by Sun Gaitan RN  Outcome: Progressing  4/28/2024 0658 by Lauryn Guevara RN  Outcome: Progressing     Problem: Skin/Tissue Integrity  Goal: Absence of new skin breakdown  Description: 1.  Monitor for areas of redness and/or skin breakdown  2.  Assess vascular access sites hourly  3.  Every 4-6 hours minimum:  Change oxygen saturation probe site  4.  Every 4-6 hours:  If on nasal continuous positive airway pressure, respiratory therapy assess nares and determine need for appliance change or resting period.  4/28/2024 1400 by Sun Gaitan RN  Outcome: Progressing  4/28/2024 0658 by Lauryn Guevara RN  Outcome: Progressing     Problem: ABCDS Injury Assessment  Goal: Absence of physical injury  4/28/2024 1400 by Sun Gaitan RN  Outcome: Progressing  4/28/2024 0658 by Lauryn Guevara RN  Outcome: Progressing     Problem: Safety - Adult  Goal: Free from fall injury  4/28/2024 1400 by Sun Gaitan RN  Outcome: Progressing  4/28/2024 0658 by Lauryn Guevara RN  Outcome: Progressing

## 2024-04-29 ENCOUNTER — APPOINTMENT (OUTPATIENT)
Facility: HOSPITAL | Age: 89
DRG: 871 | End: 2024-04-29
Payer: MEDICARE

## 2024-04-29 PROBLEM — R53.81 DEBILITY: Status: ACTIVE | Noted: 2024-04-29

## 2024-04-29 PROBLEM — Z51.5 PALLIATIVE CARE ENCOUNTER: Status: ACTIVE | Noted: 2024-04-29

## 2024-04-29 PROBLEM — Z78.9 FULL CODE STATUS: Status: ACTIVE | Noted: 2024-04-29

## 2024-04-29 LAB
ANION GAP SERPL CALC-SCNC: 6 MMOL/L (ref 5–15)
BASOPHILS # BLD: 0 K/UL (ref 0–0.1)
BASOPHILS NFR BLD: 0 % (ref 0–1)
BUN SERPL-MCNC: 20 MG/DL (ref 6–20)
BUN/CREAT SERPL: 17 (ref 12–20)
CALCIUM SERPL-MCNC: 8.3 MG/DL (ref 8.5–10.1)
CHLORIDE SERPL-SCNC: 110 MMOL/L (ref 97–108)
CO2 SERPL-SCNC: 18 MMOL/L (ref 21–32)
CREAT SERPL-MCNC: 1.18 MG/DL (ref 0.7–1.3)
DIFFERENTIAL METHOD BLD: ABNORMAL
ECHO AO ROOT DIAM: 3.7 CM
ECHO AO ROOT INDEX: 1.85 CM/M2
ECHO AV AREA PEAK VELOCITY: 4 CM2
ECHO AV AREA PEAK VELOCITY: 4 CM2
ECHO AV AREA PEAK VELOCITY: 4.1 CM2
ECHO AV AREA PEAK VELOCITY: 4.1 CM2
ECHO AV AREA VTI: 4.4 CM2
ECHO AV AREA/BSA VTI: 2.2 CM2/M2
ECHO AV CUSP MM: 2.2 CM
ECHO AV MEAN GRADIENT: 6 MMHG
ECHO AV MEAN VELOCITY: 1.2 M/S
ECHO AV PEAK GRADIENT: 9 MMHG
ECHO AV PEAK GRADIENT: 9 MMHG
ECHO AV PEAK VELOCITY: 1.5 M/S
ECHO AV PEAK VELOCITY: 1.5 M/S
ECHO AV VTI: 21.5 CM
ECHO BSA: 2.05 M2
ECHO EST RA PRESSURE: 8 MMHG
ECHO LA DIAMETER INDEX: 1.65 CM/M2
ECHO LA DIAMETER: 3.3 CM
ECHO LA TO AORTIC ROOT RATIO: 0.89
ECHO LA VOL A-L A2C: 55 ML (ref 18–58)
ECHO LA VOL A-L A4C: 37 ML (ref 18–58)
ECHO LA VOL MOD A2C: 49 ML (ref 18–58)
ECHO LA VOL MOD A4C: 32 ML (ref 18–58)
ECHO LA VOLUME AREA LENGTH: 53 ML
ECHO LA VOLUME INDEX A-L A2C: 28 ML/M2 (ref 16–34)
ECHO LA VOLUME INDEX A-L A4C: 19 ML/M2 (ref 16–34)
ECHO LA VOLUME INDEX AREA LENGTH: 27 ML/M2 (ref 16–34)
ECHO LA VOLUME INDEX MOD A2C: 25 ML/M2 (ref 16–34)
ECHO LA VOLUME INDEX MOD A4C: 16 ML/M2 (ref 16–34)
ECHO LV FRACTIONAL SHORTENING: 35 % (ref 28–44)
ECHO LV INTERNAL DIMENSION DIASTOLE INDEX: 1.55 CM/M2
ECHO LV INTERNAL DIMENSION DIASTOLIC: 3.1 CM (ref 4.2–5.9)
ECHO LV INTERNAL DIMENSION SYSTOLIC INDEX: 1 CM/M2
ECHO LV INTERNAL DIMENSION SYSTOLIC: 2 CM
ECHO LV IVSD: 1.8 CM (ref 0.6–1)
ECHO LV MASS 2D: 193.9 G (ref 88–224)
ECHO LV MASS INDEX 2D: 97 G/M2 (ref 49–115)
ECHO LV POSTERIOR WALL DIASTOLIC: 1.5 CM (ref 0.6–1)
ECHO LV RELATIVE WALL THICKNESS RATIO: 0.97
ECHO LVOT AREA: 3.5 CM2
ECHO LVOT AV VTI INDEX: 1.26
ECHO LVOT DIAM: 2.1 CM
ECHO LVOT MEAN GRADIENT: 7 MMHG
ECHO LVOT PEAK GRADIENT: 13 MMHG
ECHO LVOT PEAK GRADIENT: 13 MMHG
ECHO LVOT PEAK VELOCITY: 1.8 M/S
ECHO LVOT PEAK VELOCITY: 1.8 M/S
ECHO LVOT STROKE VOLUME INDEX: 46.7 ML/M2
ECHO LVOT SV: 93.5 ML
ECHO LVOT VTI: 27 CM
ECHO MV AREA VTI: 6.4 CM2
ECHO MV LVOT VTI INDEX: 0.54
ECHO MV MAX VELOCITY: 1 M/S
ECHO MV MEAN GRADIENT: 2 MMHG
ECHO MV MEAN VELOCITY: 0.7 M/S
ECHO MV PEAK GRADIENT: 4 MMHG
ECHO MV VTI: 14.5 CM
ECHO PV MAX VELOCITY: 1.1 M/S
ECHO PV PEAK GRADIENT: 4 MMHG
ECHO RIGHT VENTRICULAR SYSTOLIC PRESSURE (RVSP): 21 MMHG
ECHO RV FREE WALL PEAK S': 12 CM/S
ECHO RV INTERNAL DIMENSION: 2.8 CM
ECHO TV REGURGITANT MAX VELOCITY: 1.82 M/S
ECHO TV REGURGITANT PEAK GRADIENT: 13 MMHG
EOSINOPHIL # BLD: 0.3 K/UL (ref 0–0.4)
EOSINOPHIL NFR BLD: 5 % (ref 0–7)
ERYTHROCYTE [DISTWIDTH] IN BLOOD BY AUTOMATED COUNT: 13.6 % (ref 11.5–14.5)
GLUCOSE SERPL-MCNC: 98 MG/DL (ref 65–100)
HCT VFR BLD AUTO: 38.8 % (ref 36.6–50.3)
HGB BLD-MCNC: 12.3 G/DL (ref 12.1–17)
IMM GRANULOCYTES # BLD AUTO: 0 K/UL (ref 0–0.04)
IMM GRANULOCYTES NFR BLD AUTO: 0 % (ref 0–0.5)
LYMPHOCYTES # BLD: 1.5 K/UL (ref 0.8–3.5)
LYMPHOCYTES NFR BLD: 23 % (ref 12–49)
MAGNESIUM SERPL-MCNC: 1.9 MG/DL (ref 1.6–2.4)
MCH RBC QN AUTO: 29 PG (ref 26–34)
MCHC RBC AUTO-ENTMCNC: 31.7 G/DL (ref 30–36.5)
MCV RBC AUTO: 91.5 FL (ref 80–99)
MONOCYTES # BLD: 1.3 K/UL (ref 0–1)
MONOCYTES NFR BLD: 19 % (ref 5–13)
NEUTS BAND NFR BLD MANUAL: 2 %
NEUTS SEG # BLD: 3.6 K/UL (ref 1.8–8)
NEUTS SEG NFR BLD: 51 % (ref 32–75)
NRBC # BLD: 0 K/UL (ref 0–0.01)
NRBC BLD-RTO: 0 PER 100 WBC
PHOSPHATE SERPL-MCNC: 2.3 MG/DL (ref 2.6–4.7)
PLATELET # BLD AUTO: 112 K/UL (ref 150–400)
POTASSIUM SERPL-SCNC: 4.1 MMOL/L (ref 3.5–5.1)
RBC # BLD AUTO: 4.24 M/UL (ref 4.1–5.7)
RBC MORPH BLD: ABNORMAL
SODIUM SERPL-SCNC: 134 MMOL/L (ref 136–145)
WBC # BLD AUTO: 6.7 K/UL (ref 4.1–11.1)

## 2024-04-29 PROCEDURE — 6360000002 HC RX W HCPCS: Performed by: STUDENT IN AN ORGANIZED HEALTH CARE EDUCATION/TRAINING PROGRAM

## 2024-04-29 PROCEDURE — 83735 ASSAY OF MAGNESIUM: CPT

## 2024-04-29 PROCEDURE — 6370000000 HC RX 637 (ALT 250 FOR IP): Performed by: GENERAL ACUTE CARE HOSPITAL

## 2024-04-29 PROCEDURE — 80048 BASIC METABOLIC PNL TOTAL CA: CPT

## 2024-04-29 PROCEDURE — 6360000002 HC RX W HCPCS: Performed by: GENERAL ACUTE CARE HOSPITAL

## 2024-04-29 PROCEDURE — 85025 COMPLETE CBC W/AUTO DIFF WBC: CPT

## 2024-04-29 PROCEDURE — 6370000000 HC RX 637 (ALT 250 FOR IP): Performed by: NURSE PRACTITIONER

## 2024-04-29 PROCEDURE — 2060000000 HC ICU INTERMEDIATE R&B

## 2024-04-29 PROCEDURE — 2580000003 HC RX 258: Performed by: GENERAL ACUTE CARE HOSPITAL

## 2024-04-29 PROCEDURE — 36415 COLL VENOUS BLD VENIPUNCTURE: CPT

## 2024-04-29 PROCEDURE — 6370000000 HC RX 637 (ALT 250 FOR IP): Performed by: STUDENT IN AN ORGANIZED HEALTH CARE EDUCATION/TRAINING PROGRAM

## 2024-04-29 PROCEDURE — 84100 ASSAY OF PHOSPHORUS: CPT

## 2024-04-29 PROCEDURE — 93306 TTE W/DOPPLER COMPLETE: CPT

## 2024-04-29 PROCEDURE — 99223 1ST HOSP IP/OBS HIGH 75: CPT | Performed by: INTERNAL MEDICINE

## 2024-04-29 RX ORDER — ATORVASTATIN CALCIUM 20 MG/1
20 TABLET, FILM COATED ORAL DAILY
Status: DISCONTINUED | OUTPATIENT
Start: 2024-04-30 | End: 2024-04-30 | Stop reason: HOSPADM

## 2024-04-29 RX ORDER — CLOPIDOGREL BISULFATE 75 MG/1
75 TABLET ORAL DAILY
Status: DISCONTINUED | OUTPATIENT
Start: 2024-04-30 | End: 2024-04-30 | Stop reason: HOSPADM

## 2024-04-29 RX ADMIN — Medication 400 MG: at 09:01

## 2024-04-29 RX ADMIN — TAMSULOSIN HYDROCHLORIDE 0.4 MG: 0.4 CAPSULE ORAL at 09:01

## 2024-04-29 RX ADMIN — TICAGRELOR 30 MG: 60 TABLET ORAL at 09:00

## 2024-04-29 RX ADMIN — ATORVASTATIN CALCIUM 40 MG: 40 TABLET, FILM COATED ORAL at 08:59

## 2024-04-29 RX ADMIN — ENOXAPARIN SODIUM 40 MG: 100 INJECTION SUBCUTANEOUS at 09:00

## 2024-04-29 RX ADMIN — CEFTRIAXONE SODIUM 2000 MG: 2 INJECTION, POWDER, FOR SOLUTION INTRAMUSCULAR; INTRAVENOUS at 19:07

## 2024-04-29 RX ADMIN — SODIUM CHLORIDE, PRESERVATIVE FREE 10 ML: 5 INJECTION INTRAVENOUS at 09:07

## 2024-04-29 RX ADMIN — Medication 400 MG: at 21:09

## 2024-04-29 RX ADMIN — POTASSIUM & SODIUM PHOSPHATES POWDER PACK 280-160-250 MG 500 MG: 280-160-250 PACK at 09:53

## 2024-04-29 RX ADMIN — FINASTERIDE 5 MG: 5 TABLET, FILM COATED ORAL at 09:01

## 2024-04-29 RX ADMIN — SODIUM CHLORIDE, PRESERVATIVE FREE 10 ML: 5 INJECTION INTRAVENOUS at 21:09

## 2024-04-29 RX ADMIN — SODIUM CHLORIDE 25 ML: 9 INJECTION, SOLUTION INTRAVENOUS at 19:02

## 2024-04-29 RX ADMIN — SODIUM CHLORIDE: 9 INJECTION, SOLUTION INTRAVENOUS at 09:21

## 2024-04-29 RX ADMIN — SODIUM CHLORIDE, PRESERVATIVE FREE 10 ML: 5 INJECTION INTRAVENOUS at 09:09

## 2024-04-29 RX ADMIN — FOLIC ACID 1 MG: 1 TABLET ORAL at 09:02

## 2024-04-29 RX ADMIN — METOPROLOL SUCCINATE 75 MG: 50 TABLET, EXTENDED RELEASE ORAL at 08:59

## 2024-04-29 RX ADMIN — ASPIRIN 81 MG: 81 TABLET, COATED ORAL at 09:01

## 2024-04-29 RX ADMIN — SODIUM CHLORIDE: 9 INJECTION, SOLUTION INTRAVENOUS at 22:53

## 2024-04-29 ASSESSMENT — PAIN SCALES - GENERAL
PAINLEVEL_OUTOF10: 0

## 2024-04-29 NOTE — CONSULTS
Palliative Medicine  Patient Name: Yosef Ruvalcaba  YOB: 1935  MRN: [id number]  Age: 88 y.o.  Gender: male    Date of Initial Consult: 4/27/2024  Date of Service: 4/29/2024  Time: 8:50 PM  Provider: Cassidy Marti MD  Hospital Day: 3  Admit Date: 4/27/2024  Referring Provider: Roopa      Reasons for Consultation:  Goals of Care    HISTORY OF PRESENT ILLNESS (HPI):   Yosef Ruvalcaba is a 88 y.o. male with a past medical history of CAD s/p CABG 2015, hypertension, hyperlipidemia, recent right MCA stroke, severe right ICA stenosis s/p stent on 3/29/24,  who was admitted on 4/27/2024 from home with a diagnosis of type II MI in the setting of left ventricular outlet LVOT obstruction/worsening CKD/UTI, metabolic encephalopathy new right lower extremity weakness rule out stroke failure to thrive.    Per neurology there is no clinical evidence of stroke, CT scan is negative for any acute findings. Recommendation to treat underlying toxic metabolic derangement and to continue with delirium precaution,  minimize cognitively impacting medication.      Cardiology documentation noted, suggested medical treatment, troponin trending down, likely type II MI in the setting of ROYER with encephalopathy and acute infection.    Echo TTE 3/25/2024:  Left Ventricle: Hyperdynamic left ventricular systolic function with a visually estimated EF of 65 - 70%.  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      Psychosocial: Patient is , his daughter Lilia lives with him.  Patient has another daughter Davina  and son Yosef Ruvalcaba Jr.  At baseline patient is ambulatory without any gait aids, he is declining in function and slowing down mentally since his stroke recently in March 2024.      PALLIATIVE DIAGNOSES:    Palliative care encounter  Hypertrophic cardiomyopathy  Poststroke  debility  DNR discussion  Goals of care  Lack of motivation since stroke, poststroke 
CORONARY ARTERY BYPASS GRAFT  2000    ECHO 2D ADULT  2001    LVH, normal LV wall motion and ejection fraction and no significant valvular pathology.    IR CAROTID STENT W PROTECTION  3/29/2024    IR CAROTID STENT W PROTECTION 3/29/2024 [facility] RAD ANGIO IR        No family history on file.     Social History     Tobacco Use    Smoking status: Never    Smokeless tobacco: Never   Substance Use Topics    Alcohol use: No        No Known Allergies     Prior to Admission medications    Medication Sig Start Date End Date Taking? Authorizing Provider   amLODIPine (NORVASC) 10 MG tablet Take 1 tablet by mouth daily 4/2/24   Lana Ervin MD   aspirin 81 MG chewable tablet Take 1 tablet by mouth daily 4/2/24   Lana Ervin MD   finasteride (PROSCAR) 5 MG tablet Take 1 tablet by mouth daily 4/2/24   Lana Ervin MD   atorvastatin (LIPITOR) 40 MG tablet Take 1 tablet by mouth daily 4/2/24   Lana Ervin MD   tamsulosin (FLOMAX) 0.4 MG capsule Take 1 capsule by mouth daily 4/2/24   Lana Ervin MD   ticagrelor (BRILINTA) 60 MG TABS tablet Take 0.5 tablets by mouth 2 times daily 4/1/24 5/1/24  Lana Ervin MD   metoprolol succinate (TOPROL XL) 50 MG extended release tablet TAKE 1 TABLET BY MOUTH EVERY DAY 1/2/24   Lorraine Cortés MD       Review of Systems:    General, constitutional: negative  Eyes, vision: negative  Ears, nose, throat: negative  Cardiovascular, heart: negative  Respiratory: negative  Gastrointestinal: negative  Genitourinary: negative  Musculoskeletal: negative  Skin and integumentary: negative  Psychiatric: negative  Endocrine: negative  Neurological: negative, except for HPI  Hematologic/lymphatic: negative  Allergy/immunology: negative    []Unable to obtain  ROS due to  []mental status change  []sedated   []intubated      PHYSICAL EXAMINATION:   /63   Pulse 99   Temp 98.3 °F (36.8 °C) (Oral)   Resp 23   Ht 1.702 m (5' 7.01\")   Wt 87.9 kg (193 lb 12.6 oz)   SpO2 96%   BMI 
symptoms started.    Pt follows with Dr. oCrtés at [facility] for cardiology.  Last seen in 12/2023 for routine fu and was deemed to be doing well from cardiac standpoint.      Patient Active Problem List    Diagnosis Date Noted    Pure hypercholesterolemia 02/10/2011    NSTEMI (non-ST elevated myocardial infarction) (HCC) 04/27/2024    History of stroke 04/16/2024    Carotid stenosis, right 04/01/2024    Absent pedal pulses 03/29/2024    Acute cerebrovascular accident (CVA) due to stenosis of right carotid artery (HCC) 03/25/2024    Internal carotid artery stenosis, right 03/25/2024    Acute CVA (cerebrovascular accident) ([facility]) 03/24/2024    Acute ischemic right MCA stroke ([facility]) 03/24/2024    Symptomatic carotid artery stenosis, right 03/24/2024    Hyperlipidemia 03/24/2024    Prediabetes 03/24/2024    Left arm weakness 03/24/2024    Left-sided weakness 03/23/2024    Obesity (BMI 30.0-34.9) 06/20/2023    CAD (coronary artery disease)     Primary hypertension     S/P CABG (coronary artery bypass graft) 05/07/2015    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      Filipe Johnston MD  Past Medical History:   Diagnosis Date    CAD (coronary artery disease)     CABG 2000, EF normal by echo 2001    Hypertension     Left ventricular outflow tract obstruction 5/8/2014    Pure hypercholesterolemia     S/P CABG (coronary artery bypass graft) 5/7/2015      Social History     Socioeconomic History    Marital status:    Tobacco Use    Smoking status: Never    Smokeless tobacco: Never   Substance and Sexual Activity    Alcohol use: No    Drug use: No     Social Determinants of Health     Financial Resource Strain: Low Risk  (6/20/2023)    Overall Financial Resource Strain (CARDIA)     Difficulty of Paying Living Expenses: Not hard at all   Food Insecurity: No Food Insecurity (4/27/2024)    Hunger Vital Sign     Worried About Running Out of Food in the Last Year: Never true     Ran Out of Food in the Last Year: Never

## 2024-04-29 NOTE — PLAN OF CARE
Problem: Discharge Planning  Goal: Discharge to home or other facility with appropriate resources  4/29/2024 0012 by Lauryn Guevara RN  Outcome: Progressing  4/28/2024 1400 by Sun Gaitan RN  Outcome: Progressing     Problem: Pain  Goal: Verbalizes/displays adequate comfort level or baseline comfort level  4/29/2024 0012 by Lauryn Guevara RN  Outcome: Progressing  4/28/2024 1400 by Sun Gaitan RN  Outcome: Progressing     Problem: Skin/Tissue Integrity  Goal: Absence of new skin breakdown  Description: 1.  Monitor for areas of redness and/or skin breakdown  2.  Assess vascular access sites hourly  3.  Every 4-6 hours minimum:  Change oxygen saturation probe site  4.  Every 4-6 hours:  If on nasal continuous positive airway pressure, respiratory therapy assess nares and determine need for appliance change or resting period.  4/29/2024 0012 by Lauryn Guevara RN  Outcome: Progressing  4/28/2024 1400 by Sun Gaitan RN  Outcome: Progressing     Problem: ABCDS Injury Assessment  Goal: Absence of physical injury  4/29/2024 0012 by Lauryn Guevara RN  Outcome: Progressing  4/28/2024 1400 by Sun Gaitan RN  Outcome: Progressing     Problem: Safety - Adult  Goal: Free from fall injury  4/29/2024 0012 by Lauryn Guevara RN  Outcome: Progressing  4/28/2024 1400 by Sun Gaitan RN  Outcome: Progressing

## 2024-04-29 NOTE — H&P
Zayda Blas MD  Physician  Hospitalist     Progress Notes      Addendum     Date of Service: 2024  5:30 PM     Expand All Collapse All        Hospitalist Admission Note     NAME:              Yosef Ruavlcaba   :   1935   MRN:   [id number]      Date/Time: 2024 6:41 PM     Patient PCP: Filipe Johnston MD     ______________________________________________________________________  Given the patient's current clinical presentation, I have a high level of concern for decompensation if discharged from the emergency department.  Complex decision making was performed, which includes reviewing the patient's available past medical records, laboratory results, and x-ray films.        My assessment of this patient's clinical condition and my plan of care is as follows.     Assessment / Plan:     Elevated troponin, likely type II MI in the setting of LVOT/worsening CKD/UTI  CAD status post CABG on   Hypertension/hyperlipidemia  LVOT obstruction  Admit to stepdown  No CP/SOB. CK level neg  EKG w LVH, TWI I, aVL and V3-4-5-6 looks the same from march  Hold off heparin drip as per D/w Cardiology over the phone  Continue aspirin and Brilinta, recently started for carotid stenting  Given LVOT, cont BB, avoid hypotension, vasodilators, avoid nitro (as long as no ACS)   Statin   Repeat ECHO, recent echo was \"limited\"      AMS  New right lower extremity weakness  Failure to thrive  Metabolic encephalopathy versus worsening stroke  Severe right ICA stenosis status post stent on 3/29, on DAPT  Recent right MCA stroke  IV fluids  Monitor intake and output and daily weight  Palliative consult given decline from prior functional status  Stroke pathway  Repeat ECHO, recent echo was \"limited\"   MRI brain   Continue aspirin and Brilinta  Continue statin  BP soft, hold Norvasc for now. Cont BB  Neuro consult  PT OT  Palliative consult     UTI  Leukocytosis/fever  Sepsis d/t above   BPH  Worsening CKD stage III,

## 2024-04-30 ENCOUNTER — APPOINTMENT (OUTPATIENT)
Facility: HOSPITAL | Age: 89
DRG: 871 | End: 2024-04-30
Attending: GENERAL ACUTE CARE HOSPITAL
Payer: MEDICARE

## 2024-04-30 VITALS
DIASTOLIC BLOOD PRESSURE: 71 MMHG | RESPIRATION RATE: 11 BRPM | OXYGEN SATURATION: 96 % | TEMPERATURE: 98 F | HEART RATE: 80 BPM | HEIGHT: 67 IN | BODY MASS INDEX: 30.76 KG/M2 | WEIGHT: 196 LBS | SYSTOLIC BLOOD PRESSURE: 151 MMHG

## 2024-04-30 PROBLEM — R29.90 STROKE-LIKE EPISODE: Status: ACTIVE | Noted: 2024-04-30

## 2024-04-30 PROBLEM — I65.23 BILATERAL CAROTID ARTERY STENOSIS: Status: ACTIVE | Noted: 2024-04-30

## 2024-04-30 PROBLEM — Z01.810 PREOP CARDIOVASCULAR EXAM: Status: ACTIVE | Noted: 2024-04-30

## 2024-04-30 LAB
ANION GAP SERPL CALC-SCNC: 5 MMOL/L (ref 5–15)
BACTERIA SPEC CULT: ABNORMAL
BASOPHILS # BLD: 0.1 K/UL (ref 0–0.1)
BASOPHILS NFR BLD: 1 % (ref 0–1)
BUN SERPL-MCNC: 12 MG/DL (ref 6–20)
BUN/CREAT SERPL: 10 (ref 12–20)
CALCIUM SERPL-MCNC: 9.1 MG/DL (ref 8.5–10.1)
CC UR VC: ABNORMAL
CHLORIDE SERPL-SCNC: 110 MMOL/L (ref 97–108)
CO2 SERPL-SCNC: 21 MMOL/L (ref 21–32)
CREAT SERPL-MCNC: 1.15 MG/DL (ref 0.7–1.3)
DIFFERENTIAL METHOD BLD: ABNORMAL
ECHO BSA: 2.05 M2
EOSINOPHIL # BLD: 0.3 K/UL (ref 0–0.4)
EOSINOPHIL NFR BLD: 5 % (ref 0–7)
ERYTHROCYTE [DISTWIDTH] IN BLOOD BY AUTOMATED COUNT: 13.5 % (ref 11.5–14.5)
GLUCOSE SERPL-MCNC: 94 MG/DL (ref 65–100)
HCT VFR BLD AUTO: 37.9 % (ref 36.6–50.3)
HGB BLD-MCNC: 12 G/DL (ref 12.1–17)
IMM GRANULOCYTES # BLD AUTO: 0.1 K/UL (ref 0–0.04)
IMM GRANULOCYTES NFR BLD AUTO: 1 % (ref 0–0.5)
LYMPHOCYTES # BLD: 1.8 K/UL (ref 0.8–3.5)
LYMPHOCYTES NFR BLD: 29 % (ref 12–49)
MAGNESIUM SERPL-MCNC: 1.9 MG/DL (ref 1.6–2.4)
MCH RBC QN AUTO: 28.6 PG (ref 26–34)
MCHC RBC AUTO-ENTMCNC: 31.7 G/DL (ref 30–36.5)
MCV RBC AUTO: 90.5 FL (ref 80–99)
MONOCYTES # BLD: 0.9 K/UL (ref 0–1)
MONOCYTES NFR BLD: 14 % (ref 5–13)
NEUTS SEG # BLD: 3 K/UL (ref 1.8–8)
NEUTS SEG NFR BLD: 50 % (ref 32–75)
NRBC # BLD: 0 K/UL (ref 0–0.01)
NRBC BLD-RTO: 0 PER 100 WBC
PHOSPHATE SERPL-MCNC: 2.4 MG/DL (ref 2.6–4.7)
PLATELET # BLD AUTO: 139 K/UL (ref 150–400)
PMV BLD AUTO: 11.1 FL (ref 8.9–12.9)
POTASSIUM SERPL-SCNC: 4.1 MMOL/L (ref 3.5–5.1)
RBC # BLD AUTO: 4.19 M/UL (ref 4.1–5.7)
SERVICE CMNT-IMP: ABNORMAL
SODIUM SERPL-SCNC: 136 MMOL/L (ref 136–145)
VAS LEFT CCA DIST EDV: 10 CM/S
VAS LEFT CCA DIST PSV: 95.6 CM/S
VAS LEFT CCA PROX EDV: 12.1 CM/S
VAS LEFT CCA PROX PSV: 137.4 CM/S
VAS LEFT ECA EDV: 8.08 CM/S
VAS LEFT ECA PSV: 152.9 CM/S
VAS LEFT ICA DIST EDV: 14.4 CM/S
VAS LEFT ICA DIST PSV: 58 CM/S
VAS LEFT ICA MID EDV: 16 CM/S
VAS LEFT ICA MID PSV: 88.7 CM/S
VAS LEFT ICA PROX EDV: 6.8 CM/S
VAS LEFT ICA PROX PSV: 71.4 CM/S
VAS LEFT ICA/CCA PSV: 0.93 NO UNITS
VAS LEFT SUBCLAVIAN PROX EDV: 0 CM/S
VAS LEFT SUBCLAVIAN PROX PSV: 105 CM/S
VAS LEFT VERTEBRAL EDV: 9.32 CM/S
VAS LEFT VERTEBRAL PSV: 28.7 CM/S
VAS RIGHT CCA DIST EDV: 5.2 CM/S
VAS RIGHT CCA DIST PSV: 68.2 CM/S
VAS RIGHT CCA PROX EDV: 11.6 CM/S
VAS RIGHT CCA PROX PSV: 141.7 CM/S
VAS RIGHT ECA EDV: 10.25 CM/S
VAS RIGHT ECA PSV: 108.8 CM/S
VAS RIGHT ICA DIST EDV: 9.3 CM/S
VAS RIGHT ICA DIST PSV: 45.6 CM/S
VAS RIGHT ICA MID EDV: 11.6 CM/S
VAS RIGHT ICA MID PSV: 61.7 CM/S
VAS RIGHT ICA PROX EDV: 8.4 CM/S
VAS RIGHT ICA PROX PSV: 71.4 CM/S
VAS RIGHT ICA/CCA PSV: 1.1 NO UNITS
VAS RIGHT SUBCLAVIAN PROX EDV: 0 CM/S
VAS RIGHT SUBCLAVIAN PROX PSV: 141.5 CM/S
VAS RIGHT VERTEBRAL EDV: 6.73 CM/S
VAS RIGHT VERTEBRAL PSV: 32.6 CM/S
WBC # BLD AUTO: 6.1 K/UL (ref 4.1–11.1)

## 2024-04-30 PROCEDURE — 6370000000 HC RX 637 (ALT 250 FOR IP): Performed by: STUDENT IN AN ORGANIZED HEALTH CARE EDUCATION/TRAINING PROGRAM

## 2024-04-30 PROCEDURE — 84100 ASSAY OF PHOSPHORUS: CPT

## 2024-04-30 PROCEDURE — 6360000002 HC RX W HCPCS: Performed by: STUDENT IN AN ORGANIZED HEALTH CARE EDUCATION/TRAINING PROGRAM

## 2024-04-30 PROCEDURE — 2580000003 HC RX 258: Performed by: GENERAL ACUTE CARE HOSPITAL

## 2024-04-30 PROCEDURE — 6370000000 HC RX 637 (ALT 250 FOR IP): Performed by: NURSE PRACTITIONER

## 2024-04-30 PROCEDURE — 80048 BASIC METABOLIC PNL TOTAL CA: CPT

## 2024-04-30 PROCEDURE — 6370000000 HC RX 637 (ALT 250 FOR IP): Performed by: GENERAL ACUTE CARE HOSPITAL

## 2024-04-30 PROCEDURE — 85025 COMPLETE CBC W/AUTO DIFF WBC: CPT

## 2024-04-30 PROCEDURE — 93880 EXTRACRANIAL BILAT STUDY: CPT

## 2024-04-30 PROCEDURE — 83735 ASSAY OF MAGNESIUM: CPT

## 2024-04-30 RX ORDER — METOPROLOL SUCCINATE 25 MG/1
75 TABLET, EXTENDED RELEASE ORAL DAILY
Qty: 30 TABLET | Refills: 3 | Status: SHIPPED | OUTPATIENT
Start: 2024-04-30

## 2024-04-30 RX ORDER — LANOLIN ALCOHOL/MO/W.PET/CERES
400 CREAM (GRAM) TOPICAL 2 TIMES DAILY
Qty: 14 TABLET | Refills: 0 | Status: SHIPPED | OUTPATIENT
Start: 2024-04-30 | End: 2024-05-07

## 2024-04-30 RX ORDER — LEVOFLOXACIN 750 MG/1
750 TABLET, FILM COATED ORAL DAILY
Qty: 2 TABLET | Refills: 0 | Status: SHIPPED | OUTPATIENT
Start: 2024-04-30 | End: 2024-05-02

## 2024-04-30 RX ORDER — FOLIC ACID 1 MG/1
1 TABLET ORAL DAILY
Qty: 30 TABLET | Refills: 3 | Status: SHIPPED | OUTPATIENT
Start: 2024-04-30

## 2024-04-30 RX ORDER — LEVOFLOXACIN 750 MG/1
750 TABLET, FILM COATED ORAL DAILY
Status: DISCONTINUED | OUTPATIENT
Start: 2024-04-30 | End: 2024-04-30 | Stop reason: HOSPADM

## 2024-04-30 RX ORDER — LEVOFLOXACIN 5 MG/ML
750 INJECTION, SOLUTION INTRAVENOUS EVERY 24 HOURS
Status: DISCONTINUED | OUTPATIENT
Start: 2024-04-30 | End: 2024-04-30

## 2024-04-30 RX ORDER — CLOPIDOGREL BISULFATE 75 MG/1
75 TABLET ORAL DAILY
Qty: 30 TABLET | Refills: 3 | Status: SHIPPED | OUTPATIENT
Start: 2024-04-30

## 2024-04-30 RX ADMIN — FINASTERIDE 5 MG: 5 TABLET, FILM COATED ORAL at 10:01

## 2024-04-30 RX ADMIN — ASPIRIN 81 MG: 81 TABLET, COATED ORAL at 10:03

## 2024-04-30 RX ADMIN — Medication 400 MG: at 10:03

## 2024-04-30 RX ADMIN — LEVOFLOXACIN 750 MG: 750 TABLET, FILM COATED ORAL at 15:37

## 2024-04-30 RX ADMIN — METOPROLOL SUCCINATE 75 MG: 50 TABLET, EXTENDED RELEASE ORAL at 10:02

## 2024-04-30 RX ADMIN — CLOPIDOGREL BISULFATE 75 MG: 75 TABLET ORAL at 10:02

## 2024-04-30 RX ADMIN — ENOXAPARIN SODIUM 40 MG: 100 INJECTION SUBCUTANEOUS at 10:03

## 2024-04-30 RX ADMIN — SODIUM CHLORIDE, PRESERVATIVE FREE 10 ML: 5 INJECTION INTRAVENOUS at 12:18

## 2024-04-30 RX ADMIN — TAMSULOSIN HYDROCHLORIDE 0.4 MG: 0.4 CAPSULE ORAL at 10:01

## 2024-04-30 RX ADMIN — ATORVASTATIN CALCIUM 20 MG: 20 TABLET, FILM COATED ORAL at 10:03

## 2024-04-30 RX ADMIN — SODIUM CHLORIDE, PRESERVATIVE FREE 10 ML: 5 INJECTION INTRAVENOUS at 09:50

## 2024-04-30 RX ADMIN — SODIUM CHLORIDE: 9 INJECTION, SOLUTION INTRAVENOUS at 09:53

## 2024-04-30 RX ADMIN — FOLIC ACID 1 MG: 1 TABLET ORAL at 10:00

## 2024-04-30 ASSESSMENT — PAIN SCALES - GENERAL
PAINLEVEL_OUTOF10: 0
PAINLEVEL_OUTOF10: 0

## 2024-04-30 NOTE — CARE COORDINATION
Care Management Initial Assessment       RUR: 16% (Moderate RUR)  Readmission? Yes - 3/23/2024 - 4/1/2024 (9 days)  1st IM letter given? Yes - 04/28  1st  letter given: No     04/30/24 0831   Service Assessment   Patient Orientation Alert and Oriented;Person;Place;Situation;Self   Cognition Alert   History Provided By Patient;Child/Family  (Lilia Ruvalcaba (Child)  [phone])   Primary Caregiver Family   Accompanied By/Relationship Lilia Ruvalcaba (Child) [phone]   Support Systems Children  (Lilia Ruvalcaba (Child) [phone])   Patient's Healthcare Decision Maker is: Legal Next of Kin   PCP Verified by CM Yes   Last Visit to PCP Within last 3 months   Prior Functional Level Independent in ADLs/IADLs   Current Functional Level Assistance with the following:;Shopping;Housework   Can patient return to prior living arrangement Yes   Ability to make needs known: Good   Family able to assist with home care needs: Yes   Would you like for me to discuss the discharge plan with any other family members/significant others, and if so, who? Yes  (Lilia Ruvalcaba (Child) [phone])   Financial Resources Medicare   Community Resources None   Social/Functional History   Lives With Daughter  (Lilia Ruvalcaba (Child) [phone])   Type of Home House   Home Layout Two level;Performs ADL's on one level   Home Access Stairs to enter with rails   Entrance Stairs - Number of Steps 5   Entrance Stairs - Rails Both   Home Equipment None   Active  No   Patient's  Info Lilia Ruvalcaba (Child) [phone]   Discharge Planning   Type of Residence House   Current Services Prior To Admission None   Potential Assistance Needed N/A   Patient expects to be discharged to: House     Readmission Assessment  Number of Days since last admission?: 8-30 days (3/23/2024 - 4/1/2024 (9 days))  Previous Disposition: Outpatient Rehab  Who is being Interviewed: Patient, Caregiver (Lilia (Child)  [phone])  What was the

## 2024-04-30 NOTE — DISCHARGE INSTRUCTIONS
You were treated for strain on your heart, confusion, and a UTI. You were seen by Neurology and Cardiology. Your medications have changed. Please followup with the doctors as listed in this paperwork. Please followup with your primary care doctor and Urology.

## 2024-04-30 NOTE — DISCHARGE SUMMARY
daily weight  Palliative consult given decline from prior functional status  Stroke pathway  Repeat ECHO, recent echo was \"limited\"   Continue aspirin  - brilinta changed to plavix  Continue statin, dose decreased  BP soft, stopped Norvasc. Cont BB  Neuro consulted, MRI not needed, have signed off  PT OT  Palliative consult  - vascular duplex showed mild LICA and patent R carotid artery stent, normal flow in R and L vertebral artery  - see med list below for changes     Klebsiella UTI  Leukocytosis/fever  Sepsis d/t above   BPH  Worsening CKD stage III, Baseline creatinine around 1.2  Poor oral intake, X24 hrs  IVF  Start Rocephin 2 gm daily  IVF: NS  30 ml/kg ordered + 100 ml per hr  F/up B Cx  Lactic acid check  Bladder scan every shift  Renal ultrasound  Continue Proscar and Flomax  Follow-up with Urology regarding prostate MRI versus biopsy given concern for prostate cancer as per family  - Palliative consulted, appreciate assistance  - urine cultures noted, discharged on levofloxacin to finish abx course     Hypomagnesemia   Replete     Folate deficiency   Start folate     Discharge Exam:  Patient seen and examined by me on discharge day.  Pertinent Findings:  Patient Vitals for the past 24 hrs:   BP Temp Temp src Pulse Resp SpO2   04/30/24 1543 (!) 151/71 -- -- -- -- --   04/30/24 1540 (!) 159/75 98 °F (36.7 °C) Oral 80 11 96 %   04/30/24 1114 135/68 98 °F (36.7 °C) Oral 81 14 98 %   04/30/24 1002 132/69 -- -- 75 -- --   04/30/24 0740 135/67 97.7 °F (36.5 °C) Oral 80 16 97 %   04/30/24 0356 (!) 148/70 97.6 °F (36.4 °C) Oral 69 15 97 %   04/29/24 2241 (!) 146/68 97.5 °F (36.4 °C) Oral 74 19 99 %   04/29/24 1911 135/69 97.7 °F (36.5 °C) Oral 75 16 98 %       Gen:    Not in distress  Chest: Clear lungs  CVS:   Regular rate.  No edema  Abd:  Soft, not distended, not tender  Neuro: awake, moving all exts    Discharge/Recent Laboratory Results:  Recent Labs     04/30/24  0506      K 4.1   *   CO2 21

## 2024-04-30 NOTE — PLAN OF CARE
Problem: Discharge Planning  Goal: Discharge to home or other facility with appropriate resources  Outcome: Progressing  Flowsheets (Taken 4/29/2024 0730)  Discharge to home or other facility with appropriate resources: Identify barriers to discharge with patient and caregiver     Problem: Pain  Goal: Verbalizes/displays adequate comfort level or baseline comfort level  Outcome: Progressing  Flowsheets  Taken 4/29/2024 1911 by Jany Belcher, RN  Verbalizes/displays adequate comfort level or baseline comfort level:   Encourage patient to monitor pain and request assistance   Assess pain using appropriate pain scale   Administer analgesics based on type and severity of pain and evaluate response   Implement non-pharmacological measures as appropriate and evaluate response   Notify Licensed Independent Practitioner if interventions unsuccessful or patient reports new pain  Taken 4/29/2024 0730 by Quincy Hawk RN  Verbalizes/displays adequate comfort level or baseline comfort level: Assess pain using appropriate pain scale     Problem: Skin/Tissue Integrity  Goal: Absence of new skin breakdown  Description: 1.  Monitor for areas of redness and/or skin breakdown  2.  Assess vascular access sites hourly  3.  Every 4-6 hours minimum:  Change oxygen saturation probe site  4.  Every 4-6 hours:  If on nasal continuous positive airway pressure, respiratory therapy assess nares and determine need for appliance change or resting period.  Outcome: Progressing     Problem: ABCDS Injury Assessment  Goal: Absence of physical injury  Outcome: Progressing  Flowsheets (Taken 4/29/2024 1911 by Jany Belcher, RN)  Absence of Physical Injury: Implement safety measures based on patient assessment     Problem: Safety - Adult  Goal: Free from fall injury  Outcome: Progressing  Flowsheets (Taken 4/29/2024 1911 by Jany Belcher, RN)  Free From Fall Injury: Instruct family/caregiver on patient safety

## 2024-04-30 NOTE — PLAN OF CARE
Problem: Discharge Planning  Goal: Discharge to home or other facility with appropriate resources  Outcome: Progressing  Flowsheets (Taken 4/30/2024 7168 by Cesia Ward, RN)  Discharge to home or other facility with appropriate resources: Identify barriers to discharge with patient and caregiver     Problem: Pain  Goal: Verbalizes/displays adequate comfort level or baseline comfort level  Outcome: Progressing     Problem: Skin/Tissue Integrity  Goal: Absence of new skin breakdown  Description: 1.  Monitor for areas of redness and/or skin breakdown  2.  Assess vascular access sites hourly  3.  Every 4-6 hours minimum:  Change oxygen saturation probe site  4.  Every 4-6 hours:  If on nasal continuous positive airway pressure, respiratory therapy assess nares and determine need for appliance change or resting period.  Outcome: Progressing     Problem: ABCDS Injury Assessment  Goal: Absence of physical injury  Outcome: Progressing     Problem: Safety - Adult  Goal: Free from fall injury  Outcome: Progressing

## 2024-04-30 NOTE — PROGRESS NOTES
[facility]  [address] [place] [address]  [phone]  [email or web address]  CARDIOLOGY PROGRESS NOTE    4/29/2024 3:00 PM    Admit Date: 4/27/2024    Admit Diagnosis:   Cystitis [N30.90]  NSTEMI (non-ST elevated myocardial infarction) (HCC) [I21.4]  Stroke-like episode [R29.90]    Subjective:     Yosef Ruvalcaba was seen and examined at the bedside.  Denies any chest pain or shortness of breath.  Noted to have urinary urgency    /72   Pulse 89   Temp 98.8 °F (37.1 °C) (Oral)   Resp 21   Ht 1.702 m (5' 7.01\")   Wt 88.9 kg (196 lb)   SpO2 97%   BMI 30.69 kg/m²     Current Facility-Administered Medications   Medication Dose Route Frequency    metoprolol succinate (TOPROL XL) extended release tablet 75 mg  75 mg Oral Daily    enoxaparin (LOVENOX) injection 40 mg  40 mg SubCUTAneous Daily    acetaminophen (TYLENOL) tablet 650 mg  650 mg Oral Q6H PRN    ondansetron (ZOFRAN) injection 4 mg  4 mg IntraVENous Q6H PRN    aspirin EC tablet 81 mg  81 mg Oral Daily    atorvastatin (LIPITOR) tablet 40 mg  40 mg Oral Daily    finasteride (PROSCAR) tablet 5 mg  5 mg Oral Daily    tamsulosin (FLOMAX) capsule 0.4 mg  0.4 mg Oral Daily    ticagrelor (BRILINTA) tablet 30 mg  30 mg Oral BID    sodium chloride flush 0.9 % injection 5-40 mL  5-40 mL IntraVENous 2 times per day    sodium chloride flush 0.9 % injection 5-40 mL  5-40 mL IntraVENous PRN    0.9 % sodium chloride infusion   IntraVENous PRN    ondansetron (ZOFRAN-ODT) disintegrating tablet 4 mg  4 mg Oral Q8H PRN    Or    ondansetron (ZOFRAN) injection 4 mg  4 mg IntraVENous Q6H PRN    polyethylene glycol (GLYCOLAX) packet 17 g  17 g Oral Daily PRN    0.9 % sodium chloride infusion   IntraVENous Continuous    magnesium sulfate 1000 mg in dextrose 5% 100 mL IVPB  1,000 mg IntraVENous Once    folic acid (FOLVITE) tablet 1 mg  1 mg Oral Daily    magnesium oxide (MAG-OX) tablet 400 mg  400 mg Oral BID    sodium 
       Bedside shift change report given to BAIRON Bryant (oncoming nurse) by Jany Belcher RN (offgoing nurse).  Report included the following information SBAR, Kardex, Intake/Output, MAR, Recent Results, and Cardiac Rhythm NSR      1530 Orders for discharge were received.     1608 patient discharged. Went over medication education and signs and symptoms of heart attack and stroke. Patient verbalized understanding and all questions were addressed. Patient wheeled out with tech.   
      Hospitalist Progress Note    NAME:   Yosef Ruvalcaba   : 1935   MRN: [id number]     Date/Time: 2024 6:44 PM  Patient PCP: Filipe Johnston MD    Estimated discharge date:   Barriers:  Palliative to see      Assessment / Plan:    Elevated troponin, likely type II MI in the setting of LVOT/worsening CKD/UTI  CAD status post CABG on   Hypertension/hyperlipidemia  LVOT obstruction  Admit to stepdown  No CP/SOB. CK level neg  EKG w LVH, TWI I, aVL and V3-4-5-6 looks the same from march  Hold off heparin drip as per D/w Cardiology over the phone  recently started on asa and brilinta for carotid stenting  - changed brilinta to plavix  - continue asa  Given LVOT, cont BB, avoid hypotension, vasodilators, avoid nitro (as long as no ACS)   Statin   Repeat ECHO, recent echo was \"limited\"   - trops downtrending  - outpatient Cardiology followup     Toxic metabolic encephalopathy  New right lower extremity weakness  Failure to thrive  Metabolic encephalopathy versus worsening stroke  Severe right ICA stenosis status post stent on 3/29, on DAPT  Recent right MCA stroke  IV fluids  Monitor intake and output and daily weight  Palliative consult given decline from prior functional status  Stroke pathway  Repeat ECHO, recent echo was \"limited\"   Continue aspirin  - brilinta changed to plavix  Continue statin, dose decreased  BP soft, stopped Norvasc. Cont BB  Neuro consulted, MRI not needed, have signed off  PT OT  Palliative consult     UTI  Leukocytosis/fever  Sepsis d/t above   BPH  Worsening CKD stage III, Baseline creatinine around 1.2  Poor oral intake, X24 hrs  IVF  Start Rocephin 2 gm daily  IVF: NS  30 ml/kg ordered + 100 ml per hr  F/up B Cx  Lactic acid check  Bladder scan every shift  Renal ultrasound  Continue Proscar and Flomax  Follow-up with Urology regarding prostate MRI versus biopsy given concern for prostate cancer as per family  - Palliative consulted     Hypomagnesemia   Replete     Folate 
      Hospitalist Progress Note    NAME:   Yosef Ruvalcaba   : 1935   MRN: [id number]     Date/Time: 2024 1:01 PM  Patient PCP: Filipe Johnston MD    Estimated discharge date:   Barriers: further cardiac workup, Palliative to see      Assessment / Plan:    Elevated troponin, likely type II MI in the setting of LVOT/worsening CKD/UTI  CAD status post CABG on   Hypertension/hyperlipidemia  LVOT obstruction  Admit to stepdown  No CP/SOB. CK level neg  EKG w LVH, TWI I, aVL and V3-4-5-6 looks the same from march  Hold off heparin drip as per D/w Cardiology over the phone  Continue aspirin and Brilinta, recently started for carotid stenting  Given LVOT, cont BB, avoid hypotension, vasodilators, avoid nitro (as long as no ACS)   Statin   Repeat ECHO, recent echo was \"limited\"   - trops downtrending  - may need cardiac MRI     Toxic metabolic encephalopathy  New right lower extremity weakness  Failure to thrive  Metabolic encephalopathy versus worsening stroke  Severe right ICA stenosis status post stent on 3/29, on DAPT  Recent right MCA stroke  IV fluids  Monitor intake and output and daily weight  Palliative consult given decline from prior functional status  Stroke pathway  Repeat ECHO, recent echo was \"limited\"   Continue aspirin and Brilinta  Continue statin  BP soft, hold Norvasc for now. Cont BB  Neuro consulted, MRI not needed, have signed off  PT OT  Palliative consult  - CM for brilinta price. May need different option from [facility]     UTI  Leukocytosis/fever  Sepsis d/t above   BPH  Worsening CKD stage III, Baseline creatinine around 1.2  Poor oral intake, X24 hrs  IVF  Start Rocephin 2 gm daily  IVF: NS  30 ml/kg ordered + 100 ml per hr  F/up B Cx  Lactic acid check  Bladder scan every shift  Renal ultrasound  Continue Proscar and Flomax  Follow-up with urology regarding prostate MRI versus biopsy given concern for prostate cancer as per family  - Palliative consulted   
-Please complete MRI History and Safety Screening Form.    - Patient cannot be scanned until this form is completed and reviewed in MRI to ensure patient is SAFE and eligible for MRI.  - CALL MRI when this has been successfully completed at [phone].  
1900 Bedside and Verbal shift change report given to BAIRON Carmichael (oncoming nurse) by BAIRON Combs (offgoing nurse). Report included the following information Nurse Handoff Report, Index, Intake/Output, MAR, Recent Results, and Cardiac Rhythm NSR .     End of Shift Note    Bedside shift change report given to BAIRON Bryant (oncoming nurse) by Jany Belcher RN (offgoing nurse).  Report included the following information SBAR, Kardex, Intake/Output, MAR, Recent Results, and Cardiac Rhythm NSR    Shift worked:  1900 - 0700     Shift summary and any significant changes:     No acute changes overnight.      Concerns for physician to address:       Zone phone for oncoming shift:          Activity:     Number times ambulated in hallways past shift: 0  Number of times OOB to chair past shift: 0    Cardiac:   Cardiac Monitoring: Yes           Access:  Current line(s): PIV     Genitourinary:   Urinary status: voiding    Respiratory:      Chronic home O2 use?: NO  Incentive spirometer at bedside: N/A       GI:     Current diet:  ADULT DIET; Regular  Passing flatus: YES  Tolerating current diet: YES       Pain Management:   Patient states pain is manageable on current regimen: YES    Skin:     Interventions: specialty bed, float heels, increase time out of bed, limit briefs, internal/external urinary devices, and nutritional support    Patient Safety:  Fall Score:    Interventions: bed/chair alarm, gripper socks, and pt to call before getting OOB       Length of Stay:  Expected LOS: 4  Actual LOS: 3      Jany Belcher RN                            
4 Eyes Skin Assessment     NAME:  Yosef Ruvalcaba  YOB: 1935  MEDICAL RECORD NUMBER:  [id number]    The patient is being assessed for  Admission    I agree that at least one RN has performed a thorough Head to Toe Skin Assessment on the patient. ALL assessment sites listed below have been assessed.      Areas assessed by both nurses: head to toe    Head, Face, Ears, Shoulders, Back, Chest, Arms, Elbows, Hands, Sacrum. Buttock, Coccyx, Ischium, Legs. Feet and Heels, and Under Medical Devices         Does the Patient have a Wound? No noted wound(s)       Ernie Prevention initiated by RN: Yes  Wound Care Orders initiated by RN: No    Pressure Injury (Stage 3,4, Unstageable, DTI, NWPT, and Complex wounds) if present, place Wound referral order by RN under : No    New Ostomies, if present place, Ostomy referral order under : No     Nurse 1 eSignature: Electronically signed by Lauryn Guevara RN on 4/27/24 at 8:02 PM EDT    **SHARE this note so that the co-signing nurse can place an eSignature**    Nurse 2 eSignature: Electronically signed by Gm Vanessa RN on 4/27/24 at 8:06 PM EDT   
Bedside and Verbal shift change report given to Lauryn Guevara (oncoming nurse) by Sun Gaitan (offgoing nurse). Report included the following information Nurse Handoff Report, Index, Intake/Output, MAR, Recent Results, and Cardiac Rhythm NSR .    
Bedside shift change report given to BAIRON Bryant and BAIRON Callawya (oncoming nurse) by BAIRON Carmichael (offgoing nurse). Report included the following information Nurse Handoff Report, Recent Results, and Cardiac Rhythm NSR with 1st degree AV block .      0824--BAIRON Callaway will be charting on this patient.    1530--Patient cleared for discharge per .    1608--This RN agrees with BAIRON Callaway's charting. I have reviewed discharge instructions with the patient. The patient verbalized understanding. Discharge medications reviewed with patient and appropriate educational materials and side effects teaching were provided. Follow-up appointments reviewed. Opportunity for questions and clarification was provided.  Venous access removed without difficulty.  Patient's belongings gathered and sent with patient. Patient is ready for discharge. Patient wheeled out to ride.    Annette Love RN   
End of Shift Note    Bedside shift change report given to BAIRON Combs  (oncoming nurse) by Sun Gaitan RN (offgoing nurse).  Report included the following information SBAR, MAR, and Cardiac Rhythm Normal Sinus    Shift worked:  7a-7p     Shift summary and any significant changes:     Less confused today  IV abx continued     Concerns for physician to address:  N/a     Zone phone for oncoming shift:   1152       Activity:     Number times ambulated in hallways past shift: 0    Number of times OOB to chair past shift: 1    Cardiac:   Cardiac Monitoring: Yes           Access:  Current line(s): PIV     Genitourinary:   Urinary status: voiding    Respiratory:      Chronic home O2 use?: NO  Incentive spirometer at bedside: YES       GI:     Current diet:  ADULT DIET; Regular  Passing flatus: YES  Tolerating current diet: YES       Pain Management:   Patient states pain is manageable on current regimen: YES    Skin:     Interventions: increase time out of bed    Patient Safety:  Fall Score:    Interventions: pt to call before getting OOB       Length of Stay:  Expected LOS: 4  Actual LOS: 1      Sun Gaitan RN                            
End of Shift Note    Bedside shift change report given to Jany (oncoming nurse) by Lauryn and Quincy Hawk RN (offgoing nurses).  Report included the following information SBAR, Intake/Output, MAR, and Cardiac Rhythm NSR    Shift worked:  5123-2082     Shift summary and any significant changes:     Had acalm day, pulled the peripheral access and had to be recanulated and it was successful  -vitally stable spo            Zone phone for oncoming shift:          Activity:     Number times ambulated in hallways past shift: 0  Number of times OOB to chair past shift: 0    Cardiac:   Cardiac Monitoring: Yes           Access:  Current line(s): PIV     Genitourinary:   Urinary status: voiding    Respiratory:      Chronic home O2 use?: NO  Incentive spirometer at bedside: YES       GI:     Current diet:  ADULT DIET; Regular  Passing flatus: YES  Tolerating current diet: YES       Pain Management:   Patient states pain is manageable on current regimen: YES    Skin:     Interventions: float heels, increase time out of bed, PT/OT consult, and nutritional support    Patient Safety:  Fall Score:    Interventions: bed/chair alarm and gripper socks       Length of Stay:  Expected LOS: 4  Actual LOS: 2      Quincy Hawk RN                            
End of Shift Note    Bedside shift change report given to Lauryn WADDELL (oncoming nurse) by Lauryn Guevara RN (offgoing nurse).  Report included the following information SBAR, Kardex, Intake/Output, MAR, Recent Results, and Cardiac Rhythm NSR 1 degree    Shift worked:  3451-0388     Shift summary and any significant changes:     X1 episode of liquid stool incontinence care provided. Voiding via urinal. Frequency and urgency noted. Urine noted to be pink. Denies dysuria or general pain.      Concerns for physician to address:  Diarrhea, Stop IVF, 3lb weight gain     Zone phone for oncoming shift:   N/A       Activity:     Number times ambulated in hallways past shift: 0  Number of times OOB to chair past shift: 0    Cardiac:   Cardiac Monitoring: Yes           Access:  Current line(s): PIV     Genitourinary:   Urinary status: voiding    Respiratory:      Chronic home O2 use?: NO  Incentive spirometer at bedside: N/A       GI:     Current diet:  ADULT DIET; Regular  Passing flatus: YES  Tolerating current diet: YES       Pain Management:   Patient states pain is manageable on current regimen: YES    Skin:     Interventions: float heels, increase time out of bed, and PT/OT consult    Patient Safety:  Fall Score:    Interventions: bed/chair alarm and gripper socks       Length of Stay:  Expected LOS: 4  Actual LOS: 2      Lauryn Guevara RN                           
End of Shift Note    Bedside shift change report given to Sun (oncoming nurse) by Lauryn Guevara RN (offgoing nurse).  Report included the following information SBAR, Intake/Output, MAR, Recent Results, and Cardiac Rhythm nsr      1305 : troponin 803 Lattimere NP notified  Lauryn Guevara RN                          
Nursing contacted Nocturnist/cross cover provider and notified patient lab result of trop [phone], per notes dayshift they d/w cards and felt was 2/2 type 2 MI demand, and no heparin gtt or ACS RECs by cards at that time. Pt remains w/o c/o chest pain or dyspnea, asymptomatic. VSS. No other reported concerns at this time. Ordered 0600 repeat trop, cbc, bmp, mag, anti Xa, pt, ptt- pending. Will defer further evaluation/management to the day shift primary care team. Patient denies any further complaints or concerns. No acute distress reported. Nursing to notify Hospitalist for further/continued concerns. Will remain available overnight for further concerns if nursing/patient needs.     Non-billable note.    
Speech Pathology Contact Note:    Orders received and appreciated for SLP evaluation. Per discussion w/ RN and patient/fmaily, no concerns for speech/communication changes or swallowing difficulty.  Pt currently tolerating regular diet w/ thin liquids without concern. NIH = 0. CT Head 4/27- \"No acute abnormality.\" No further acute SLP services warranted at this time. SLP will sign off. Please re-consult if concerns arise in the future. Thanks!     Luba Betancourt, THA-SLP    
TRANSFER - IN REPORT:    Verbal report received from BAIRON Bryson on Yosef Ruvalcaba  being received from ED for routine progression of patient care      Report consisted of patient's Situation, Background, Assessment and   Recommendations(SBAR).     Information from the following report(s) Nurse Handoff Report, Intake/Output, MAR, Recent Results, and Cardiac Rhythm ST  was reviewed with the receiving nurse.    Opportunity for questions and clarification was provided.      Assessment will be completed upon patient's arrival to unit and care will be assumed. Per BAIRON Bryson, Lactic still needs to be drawn, and consults were already called.    7:35PM- Verbal shift change report given to BAIRON Combs (oncoming nurse) by BAIRON Gonsalves (offgoing nurse). Report included the following information Nurse Handoff Report, Intake/Output, MAR, Recent Results, and Cardiac Rhythm ST . Patient just brought in to Room 2312 from the ED via stretche. Assessment and care will be assumed by BAIRON Combs.      
Yaritza BULL, Griselda S, Vilma Marquis. Activity Measure for Post-Acute Care \"6-Clicks\" Basic Mobility Scores Predict Discharge Destination After Acute Care Hospitalization in Select Patient Groups: A Retrospective, Observational Study. Arch Rehabil Res Clin Transl. 2022 Jul 16;4(3):303071. doi: 10.1016/j.arrct.2022.179722. PMID: 12767880; PMCID: QGT2920571.  4. Lisbet SANTORO, Tiffany S, Declan W, Neela VALENTINO. AM-PAC Short Forms Manual 4.0. Revised 2/2020.                                                                                                                                                                                                                              Pain Rating:  Did not report pain    Activity Tolerance:   Good    After treatment:   Patient left in no apparent distress sitting up in chair, Call bell within reach, Bed/ chair alarm activated, Caregiver / family present, and Updated patient's board on functional status and mobility recommendations      COMMUNICATION/EDUCATION:   The patient's plan of care was discussed with: occupational therapist and registered nurse    Patient Education  Education Given To: Patient  Education Provided: Role of Therapy  Education Method: Verbal  Barriers to Learning: Cognition  Education Outcome: Continued education needed;Verbalized understanding    Thank you for this referral.  Isis Mccoy, PT, DPT  Minutes: 28      Physical Therapy Evaluation Charge Determination   History Examination Presentation Decision-Making   MEDIUM  Complexity : 1-2 comorbidities / personal factors will impact the outcome/ POC  MEDIUM Complexity : 3 Standardized tests and measures addressin body structure, function, activity limitation and / or participation in recreation  MEDIUM Complexity : Evolving with changing characteristics  AM-PAC  MEDIUM     Based on the above components, the patient evaluation is determined to be of the following complexity level: Medium   
                                                                      Pain Ratin/10   Pain Intervention(s):       Activity Tolerance:   Good    After treatment:   Patient left in no apparent distress sitting up in chair, Call bell within reach, Bed/ chair alarm activated, and Caregiver / family present    COMMUNICATION/EDUCATION:   The patient's plan of care was discussed with: physical therapist and registered nurse         Thank you for this referral.  Blayne Marcos OT  Minutes: 28    Occupational Therapy Evaluation Charge Determination   History Examination Decision-Making   MEDIUM Complexity : Expanded review of history including physical, cognitive and psychocial history  MEDIUM Complexity: 3-5 Performance deficits relating to physical, cognitive, or psychosocial skills that result in activity limitations and/or participation restrictions MEDIUM Complexity: Patient may present with comorbidities that affect occupational performance. Minimal to moderate modifications of tasks or assist (eg. physical or verbal) with assist is necessary to enable pt to complete eval   Based on the above components, the patient evaluation is determined to be of the following complexity level: Medium   
obstruction. 2. 3.4 cm infrarenal abdominal aortic aneurysm. 3. 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    XR CHEST (2 VW)    Result Date: 4/1/2024  EXAM: XR CHEST (2 VW) INDICATION:  prostate cancer workup COMPARISON: CT chest 4/26/2019 TECHNIQUE: PA and lateral chest views FINDINGS: The heart size is normal. The patient is status post median sternotomy. Lungs are clear of an acute process. Laterally at the level of the right mid chest there is a pleural-based opacity measuring 4.4 x 3.5 cm. This is not very dense for its size and is consistent with the lipoma seen on the CT scan of 4/16/2019. This does appear to have increased when compared with the previous examination. Osseous structures are unremarkable. Pression: 1. No acute process. 2. 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 This correlates with the lipoma seen on the previous CT scan. This does appear to have increased in the interval in size.    Vascular duplex carotid right    Result Date: 3/31/2024    No evidence of in-stent stenosis in the right carotid stent.   Probable less than 50% mechanical stenosis.   Vertebral is patent with antegrade flow.     Vascular duplex lower extremity arteries bilateral    Result Date: 3/29/2024    Hemodynamically significant stenosis in the right distal femoral with peak systolic velocities approximately 292 cm/sec .  Low monophasic flow to the distal posterior.  No obvious flow in the distal anterior tibial/dorsalis pedis.   Distal left popliteal artery occlusion,  Low monophsic flow to in the distal posterior tibial with flow supplied by the gastroc/branch.  No flow seen in the distal anterior tibial or the dorsalis pedis.        _______________________________________________________________________      TOTAL TIME:  Minutes    Critical Care Provided   65  Minutes non procedure based    I provided 35 minutes of critical care time. During this entire

## 2024-05-02 ENCOUNTER — OFFICE VISIT (OUTPATIENT)
Age: 89
End: 2024-05-02
Payer: MEDICARE

## 2024-05-02 VITALS
DIASTOLIC BLOOD PRESSURE: 60 MMHG | TEMPERATURE: 97.6 F | SYSTOLIC BLOOD PRESSURE: 110 MMHG | BODY MASS INDEX: 29.73 KG/M2 | HEART RATE: 80 BPM | OXYGEN SATURATION: 100 % | HEIGHT: 67 IN | WEIGHT: 189.4 LBS

## 2024-05-02 DIAGNOSIS — I65.23 BILATERAL CAROTID ARTERY STENOSIS: Primary | ICD-10-CM

## 2024-05-02 PROCEDURE — 99214 OFFICE O/P EST MOD 30 MIN: CPT | Performed by: RADIOLOGY

## 2024-05-02 PROCEDURE — 1123F ACP DISCUSS/DSCN MKR DOCD: CPT | Performed by: RADIOLOGY

## 2024-05-02 PROCEDURE — 1111F DSCHRG MED/CURRENT MED MERGE: CPT | Performed by: RADIOLOGY

## 2024-05-02 PROCEDURE — 1036F TOBACCO NON-USER: CPT | Performed by: RADIOLOGY

## 2024-05-02 PROCEDURE — G8427 DOCREV CUR MEDS BY ELIG CLIN: HCPCS | Performed by: RADIOLOGY

## 2024-05-02 PROCEDURE — G8417 CALC BMI ABV UP PARAM F/U: HCPCS | Performed by: RADIOLOGY

## 2024-05-02 NOTE — PATIENT INSTRUCTIONS
Follow up phone call in June 2024 after imaging is completed.  See attached paperwork to schedule imaging.  Continue Plavix and Aspirin as ordered.  Referral to Vascular surgery.

## 2024-05-02 NOTE — PROGRESS NOTES
[facility] procedure follow up.  Daughter with patient.  Patient reports no symptoms.  Reports residual weakness from stroke resolved.  Patient will answer yes/no question with a nod.  Patient did not speak during rooming.  Daughter reports patient has some lower extremity weakness.  No new issues voiced.  
visit.      Signed By: Lawrence Crawford MD     May 2, 2024

## 2024-05-03 ENCOUNTER — HOME HEALTH ADMISSION (OUTPATIENT)
Dept: HOME HEALTH SERVICES | Facility: HOME HEALTH | Age: 89
End: 2024-05-03
Payer: MEDICARE

## 2024-05-03 ENCOUNTER — TELEPHONE (OUTPATIENT)
Age: 89
End: 2024-05-03

## 2024-05-03 DIAGNOSIS — I63.9 ACUTE CVA (CEREBROVASCULAR ACCIDENT) (HCC): Primary | ICD-10-CM

## 2024-05-03 DIAGNOSIS — I21.4 NSTEMI (NON-ST ELEVATED MYOCARDIAL INFARCTION) (HCC): ICD-10-CM

## 2024-05-03 LAB
BACTERIA SPEC CULT: NORMAL
BACTERIA SPEC CULT: NORMAL
SERVICE CMNT-IMP: NORMAL
SERVICE CMNT-IMP: NORMAL

## 2024-05-03 NOTE — TELEPHONE ENCOUNTER
Spoke with patient wife, Lilia. Advised will put in order for HH as requested. Advised HH typically reaches out in a few days.   Advised pt wife the duties of HH.     Patient wife advised per Dr. Johnston  He should hydrate. If remains dizzy or any f/c sob he should go back to ER or dispatch health. Is HH seeing pt. Vitals?     Wife verbalized understanding.

## 2024-05-03 NOTE — TELEPHONE ENCOUNTER
Patient is still having dizziness he was at the table this morning. He was having a lot of dizziness. Patient had a follow up with Dr. Crawford yesterday. Patients daughter states he is eating and drinking. No chest pain or signs of stoke . Patient has not complained of any issues other other than dizzy.   Patient daughter is worried about him having  cancer. She wants him to have a MRI . They seen Dr. David Mendel and there was talk about      Follow-up with Urology regarding prostate MRI versus biopsy given concern for prostate cancer as per family      Should patient go back to the ER to be seen?

## 2024-05-03 NOTE — TELEPHONE ENCOUNTER
States pt had stroke 3 weeks ago    States 4 days ago pt had heart attack    At breakfast this morning pt was found hunched over and dizzy--pt still dizzy      Caller very concerned that pt not improving.     Transferred call to clinical team for triage.

## 2024-05-06 ENCOUNTER — TELEPHONE (OUTPATIENT)
Age: 89
End: 2024-05-06

## 2024-05-06 DIAGNOSIS — Z12.5 PROSTATE CANCER SCREENING: Primary | ICD-10-CM

## 2024-05-06 NOTE — TELEPHONE ENCOUNTER
Spoke with patient daughterLilia. Advised of urology referral. Advised to call office to schedule with provider near residence.     Referral mailed to home address.   Referral fax confirmed    Daughter verbalized understanding.

## 2024-05-06 NOTE — TELEPHONE ENCOUNTER
----- Message from Samara Dixon sent at 5/6/2024 11:45 AM EDT -----  Subject: Referral Request    Reason for referral request? Susana would like to get a referral to a   Urologist at [facility] closer to where he lives at. Does not want   to travel long distance. Please call patients daughter [phone].  Provider patient wants to be referred to(if known):     Provider Phone Number(if known):    Additional Information for Provider?   ---------------------------------------------------------------------------  --------------  CALL BACK INFO    [phone]; OK to leave message on voicemail  ---------------------------------------------------------------------------  --------------

## 2024-05-07 ENCOUNTER — OFFICE VISIT (OUTPATIENT)
Age: 89
End: 2024-05-07
Payer: MEDICARE

## 2024-05-07 VITALS
OXYGEN SATURATION: 98 % | WEIGHT: 190.2 LBS | DIASTOLIC BLOOD PRESSURE: 62 MMHG | HEART RATE: 74 BPM | SYSTOLIC BLOOD PRESSURE: 118 MMHG | BODY MASS INDEX: 29.85 KG/M2 | HEIGHT: 67 IN

## 2024-05-07 DIAGNOSIS — I67.9 CEREBRAL VASCULAR DISEASE: ICD-10-CM

## 2024-05-07 DIAGNOSIS — E78.00 HYPERCHOLESTEREMIA: ICD-10-CM

## 2024-05-07 DIAGNOSIS — Q24.8 LEFT VENTRICULAR OUTFLOW TRACT OBSTRUCTION: ICD-10-CM

## 2024-05-07 DIAGNOSIS — I21.A1 TYPE 2 MI (MYOCARDIAL INFARCTION) (HCC): ICD-10-CM

## 2024-05-07 DIAGNOSIS — Z95.1 S/P CABG (CORONARY ARTERY BYPASS GRAFT): ICD-10-CM

## 2024-05-07 DIAGNOSIS — I10 ESSENTIAL (PRIMARY) HYPERTENSION: ICD-10-CM

## 2024-05-07 DIAGNOSIS — I25.10 CORONARY ARTERY DISEASE INVOLVING NATIVE CORONARY ARTERY OF NATIVE HEART WITHOUT ANGINA PECTORIS: Primary | ICD-10-CM

## 2024-05-07 PROCEDURE — 1111F DSCHRG MED/CURRENT MED MERGE: CPT | Performed by: SPECIALIST

## 2024-05-07 PROCEDURE — 1123F ACP DISCUSS/DSCN MKR DOCD: CPT | Performed by: SPECIALIST

## 2024-05-07 PROCEDURE — G8427 DOCREV CUR MEDS BY ELIG CLIN: HCPCS | Performed by: SPECIALIST

## 2024-05-07 PROCEDURE — 1036F TOBACCO NON-USER: CPT | Performed by: SPECIALIST

## 2024-05-07 PROCEDURE — 99214 OFFICE O/P EST MOD 30 MIN: CPT | Performed by: SPECIALIST

## 2024-05-07 PROCEDURE — G8417 CALC BMI ABV UP PARAM F/U: HCPCS | Performed by: SPECIALIST

## 2024-05-07 NOTE — PROGRESS NOTES
Component Value Date/Time     04/30/2024 05:06 AM    K 4.1 04/30/2024 05:06 AM     04/30/2024 05:06 AM    CO2 21 04/30/2024 05:06 AM    BUN 12 04/30/2024 05:06 AM    CREATININE 1.15 04/30/2024 05:06 AM    GLUCOSE 94 04/30/2024 05:06 AM    CALCIUM 9.1 04/30/2024 05:06 AM    LABGLOM 62 04/30/2024 05:06 AM    LABGLOM 33 02/28/2023 05:47 PM       Wt Readings from Last 3 Encounters:   05/07/24 86.3 kg (190 lb 3.2 oz)   05/02/24 85.9 kg (189 lb 6.4 oz)   04/29/24 88.9 kg (196 lb)      BP Readings from Last 3 Encounters:   05/07/24 118/62   05/02/24 110/60   04/30/24 (!) 151/71        Current Outpatient Medications:     Ergocalciferol (VITAMIN D2 PO), Take 125 mg by mouth daily, Disp: , Rfl:     metoprolol succinate (TOPROL XL) 25 MG extended release tablet, Take 3 tablets by mouth daily (Patient taking differently: Take 3 tablets by mouth daily Taking 1/2 tablet 2 times a day), Disp: 30 tablet, Rfl: 3    folic acid (FOLVITE) 1 MG tablet, Take 1 tablet by mouth daily, Disp: 30 tablet, Rfl: 3    magnesium oxide (MAG-OX) 400 (240 Mg) MG tablet, Take 1 tablet by mouth 2 times daily for 7 days, Disp: 14 tablet, Rfl: 0    clopidogrel (PLAVIX) 75 MG tablet, Take 1 tablet by mouth daily, Disp: 30 tablet, Rfl: 3    aspirin 81 MG chewable tablet, Take 1 tablet by mouth daily, Disp: 30 tablet, Rfl: 3    finasteride (PROSCAR) 5 MG tablet, Take 1 tablet by mouth daily, Disp: 30 tablet, Rfl: 3    atorvastatin (LIPITOR) 40 MG tablet, Take 1 tablet by mouth daily, Disp: 30 tablet, Rfl: 3    tamsulosin (FLOMAX) 0.4 MG capsule, Take 1 capsule by mouth daily, Disp: 30 capsule, Rfl: 3   Impression see above.

## 2024-05-07 NOTE — PATIENT INSTRUCTIONS
You have been scheduled for a nuclear stress test. We will see you back in about 2 months with Sarahy BOO NP.     Nuclear stress testing evaluates blood flow to your heart muscle and assesses cardiac function. There are 2 parts (Rest/Stress) to this procedure and will include either an exercise on a treadmill or an IV administration of a stressing medication called Lexiscan.     *Please arrive 15 minutes prior to your appointment time    Test Duration:    -One day testing will take 4 hours    Day of testing instructions:    NO CAFFEINE (not even decaffeinated products) 24 HOURS PRIOR TO TESTING. This includes coffee, soda, tea, chocolate, multivitamins, and migraine medication, like Excedrin or Fioricet that contains caffeine.  Nothing to eat or drink 4 HOURS prior to testing  NO NICOTINE 12 hours prior to testing  Take your medications except METOPROLOL - do not take this 24 hours prior but bring with you. DIABETIC PATIENTS: Take half of your insulin with a light meal 4 hours before your test.  Wear comfortable clothes and shoes (Shirts with no metal, shorts or pants, tennis shoes, no heels or flip flops).

## 2024-05-09 ENCOUNTER — HOME CARE VISIT (OUTPATIENT)
Facility: HOME HEALTH | Age: 89
End: 2024-05-09

## 2024-05-09 PROCEDURE — G0299 HHS/HOSPICE OF RN EA 15 MIN: HCPCS

## 2024-05-09 PROCEDURE — 0221000100 HH NO PAY CLAIM PROCEDURE

## 2024-05-09 PROCEDURE — G0151 HHCP-SERV OF PT,EA 15 MIN: HCPCS

## 2024-05-10 VITALS
WEIGHT: 190 LBS | DIASTOLIC BLOOD PRESSURE: 60 MMHG | BODY MASS INDEX: 28.14 KG/M2 | SYSTOLIC BLOOD PRESSURE: 122 MMHG | HEART RATE: 81 BPM | HEIGHT: 69 IN | TEMPERATURE: 97.4 F | RESPIRATION RATE: 16 BRPM | OXYGEN SATURATION: 98 %

## 2024-05-10 ASSESSMENT — ENCOUNTER SYMPTOMS: DYSPNEA ACTIVITY LEVEL: AFTER AMBULATING MORE THAN 20 FT

## 2024-05-10 NOTE — HOME HEALTH
forms signed    Patient/caregiver instructed on plan of care and are agreeable to plan of care at this time.      Plan of care and admission to home health status called to attending physician. The following practitioner has agreed to sign the ongoing POC Filipe Johnston MD  , and was notified of the following: visit frequency of 2wk2.    Discharge planning discussed with patient and caregiver. Discharge planning as follows: Will discharge when the patient has reached their maximum functional potential and maximum safety in their home and When goals are met Patient/caregiver did verbalize agreement with discharge planning.

## 2024-05-11 NOTE — PROGRESS NOTES
HISTORY OF PRESENT ILLNESS   Yosef Ruvalcaba   is a 88 y.o.  male.  Hx htn hld cad s/p cabg 1990'a, lvot obstruction obesity ckd 3 hx CVA 3/2024 s/p with ICA stent-left arm weakness. Elevated /prostamegaly on CT      Hospital fu-admitted 4/27/4/30 for AMS , lethargy , right leg weakness and UTI-klebsiella tx rocephin  Elevated troponin in setting of ckd and uti a. LVOT-BB only for HTN. Brilinta changed to plavix  Saw CARD Dr Cortés and scheduled for NST  Head CT neg  Out pt fu prostatamegaly and ---unable to get prostate bx until completes 3 months of DAPT  Had OV with Neurointerventional -will get duplex in a few months and referred to [facility] for hx PAD /absent pedal pulses. Pt denies leg pain when walking but not walking far pt daughter  He is getting HH nursing and PT  Walking ok  Slight right hand weakness but mostly improved  No complainrs    Date of Admission: 4/27/2024  Date of Discharge: 4/30/2024  Attending Physician: Dr. Mendel     Discharge Diagnosis:   Elevated troponin, likely type II MI in the setting of LVOT/worsening CKD/UTI  CAD status post CABG on 2015  Hypertension/hyperlipidemia  LVOT obstruction  Toxic metabolic encephalopathy  New right lower extremity weakness  Failure to thrive  Metabolic encephalopathy versus worsening stroke  Severe right ICA stenosis status post stent on 3/29,  Recent right MCA stroke  UTI  Leukocytosis/fever  Sepsis d/t above   BPH  Worsening CKD stage III, Baseline creatinine around 1.2  Folate deficiency     Consultations:  IP CONSULT TO PALLIATIVE CARE  IP CONSULT TO NEUROLOGY  IP CONSULT TO CARDIOLOGY  IP CONSULT TO PALLIATIVE CARE        Brief Admission History/Reason for Admission Per Zayda Blas MD:      Yosef Ruvalcaba is a 88 y.o.  male with PMHx significant for  has a past medical history of CAD (coronary artery disease), Hypertension, Left ventricular outflow tract obstruction, Pure hypercholesterolemia, and S/P CABG (coronary artery

## 2024-05-13 ENCOUNTER — OFFICE VISIT (OUTPATIENT)
Age: 89
End: 2024-05-13

## 2024-05-13 VITALS
SYSTOLIC BLOOD PRESSURE: 102 MMHG | HEIGHT: 69 IN | DIASTOLIC BLOOD PRESSURE: 58 MMHG | RESPIRATION RATE: 16 BRPM | WEIGHT: 191.4 LBS | OXYGEN SATURATION: 98 % | HEART RATE: 64 BPM | BODY MASS INDEX: 28.35 KG/M2 | TEMPERATURE: 97 F

## 2024-05-13 DIAGNOSIS — I10 HYPERTENSION, UNSPECIFIED TYPE: ICD-10-CM

## 2024-05-13 DIAGNOSIS — E78.00 PURE HYPERCHOLESTEROLEMIA: ICD-10-CM

## 2024-05-13 DIAGNOSIS — I63.9 CEREBROVASCULAR ACCIDENT (CVA), UNSPECIFIED MECHANISM (HCC): ICD-10-CM

## 2024-05-13 DIAGNOSIS — I73.9 PAD (PERIPHERAL ARTERY DISEASE) (HCC): Primary | ICD-10-CM

## 2024-05-13 NOTE — PROGRESS NOTES
Chief Complaint   Patient presents with    Follow-Up from Hospital    Cerebrovascular Accident       HPI    Mr Ruvalcaba is a 88 year old male here for his hospital follow up. He was seen inpatient by Dr Calzada on 4/27/24 for AMS. PMHX CAD, CABG, HTN, HLD, CKD, R ICA stenosis s/p stenting 3/29/24, on ASA and Brilinta, R MCA stroke. He presented with AMS in the setting of failure to thrive. Found to have a UTI and fever with worsening CKD. Poor oral intake x 24 hours. Recently at [facility] ED on 4/1/24 for the right MCA stroke where he presented with left hand weakness. He was found to have severe right ICA stenosis. Underwent stenting on 3/29/24 with NIS. Now on ASA and Plavix and statin daily. He is here with his daughter, who he lives with. He is doing great. He was released from  and home health. He reports no deficits or difficulties. Daughter reports that his memory is still weak. He is not driving right now. Tolerating his medications without side effects. Use to walk around [employer] daily, but hasn't been doing that due to not driving. No falls.              Review of Systems   Eyes:  Negative for photophobia and visual disturbance.   Musculoskeletal:  Negative for gait problem.   Neurological:  Negative for dizziness, facial asymmetry, speech difficulty and weakness.   Psychiatric/Behavioral:  Positive for confusion. Negative for sleep disturbance.          Past Medical History:   Diagnosis Date    CAD (coronary artery disease)     CABG 2000, EF normal by echo 2001    Heart attack (HCC)     Hypertension     Left ventricular outflow tract obstruction 5/8/2014    Pure hypercholesterolemia     S/P CABG (coronary artery bypass graft) 5/7/2015    Stroke ([facility]) 03/29/2024     Family History   Family history unknown: Yes     Social History     Socioeconomic History    Marital status:      Spouse name: Not on file    Number of children: Not on file    Years of education: Not on file    Highest education level: Not

## 2024-05-14 ENCOUNTER — OFFICE VISIT (OUTPATIENT)
Age: 89
End: 2024-05-14
Payer: MEDICARE

## 2024-05-14 ENCOUNTER — HOME CARE VISIT (OUTPATIENT)
Facility: HOME HEALTH | Age: 89
End: 2024-05-14

## 2024-05-14 VITALS
WEIGHT: 191 LBS | DIASTOLIC BLOOD PRESSURE: 74 MMHG | RESPIRATION RATE: 17 BRPM | BODY MASS INDEX: 28.29 KG/M2 | HEART RATE: 78 BPM | HEIGHT: 69 IN | OXYGEN SATURATION: 98 % | SYSTOLIC BLOOD PRESSURE: 133 MMHG

## 2024-05-14 DIAGNOSIS — Z09 HOSPITAL DISCHARGE FOLLOW-UP: ICD-10-CM

## 2024-05-14 DIAGNOSIS — Z95.828 HISTORY OF RIGHT COMMON CAROTID ARTERY STENT PLACEMENT: ICD-10-CM

## 2024-05-14 DIAGNOSIS — Z98.890 HISTORY OF RIGHT COMMON CAROTID ARTERY STENT PLACEMENT: ICD-10-CM

## 2024-05-14 DIAGNOSIS — Z79.02 LONG TERM (CURRENT) USE OF ANTITHROMBOTICS/ANTIPLATELETS: ICD-10-CM

## 2024-05-14 DIAGNOSIS — Z86.73 HISTORY OF ISCHEMIC RIGHT MCA STROKE: Primary | ICD-10-CM

## 2024-05-14 PROCEDURE — G8427 DOCREV CUR MEDS BY ELIG CLIN: HCPCS

## 2024-05-14 PROCEDURE — 1111F DSCHRG MED/CURRENT MED MERGE: CPT

## 2024-05-14 PROCEDURE — 1123F ACP DISCUSS/DSCN MKR DOCD: CPT

## 2024-05-14 PROCEDURE — G8417 CALC BMI ABV UP PARAM F/U: HCPCS

## 2024-05-14 PROCEDURE — 99215 OFFICE O/P EST HI 40 MIN: CPT

## 2024-05-14 PROCEDURE — 1036F TOBACCO NON-USER: CPT

## 2024-05-14 PROCEDURE — G0151 HHCP-SERV OF PT,EA 15 MIN: HCPCS

## 2024-05-14 ASSESSMENT — PATIENT HEALTH QUESTIONNAIRE - PHQ9
SUM OF ALL RESPONSES TO PHQ QUESTIONS 1-9: 0
1. LITTLE INTEREST OR PLEASURE IN DOING THINGS: NOT AT ALL
SUM OF ALL RESPONSES TO PHQ QUESTIONS 1-9: 0
2. FEELING DOWN, DEPRESSED OR HOPELESS: NOT AT ALL
SUM OF ALL RESPONSES TO PHQ9 QUESTIONS 1 & 2: 0

## 2024-05-14 ASSESSMENT — ENCOUNTER SYMPTOMS: PHOTOPHOBIA: 0

## 2024-05-14 NOTE — PROGRESS NOTES
Chief Complaint   Patient presents with    Follow-Up from Hospital    Cerebrovascular Accident      Vitals:    05/14/24 1247   BP: 133/74   Pulse: 78   Resp: 17   SpO2: 98%

## 2024-05-15 ENCOUNTER — HOME CARE VISIT (OUTPATIENT)
Facility: HOME HEALTH | Age: 89
End: 2024-05-15

## 2024-05-15 VITALS
OXYGEN SATURATION: 96 % | HEART RATE: 84 BPM | DIASTOLIC BLOOD PRESSURE: 68 MMHG | SYSTOLIC BLOOD PRESSURE: 121 MMHG | RESPIRATION RATE: 16 BRPM | TEMPERATURE: 97.5 F

## 2024-05-15 VITALS
HEART RATE: 81 BPM | SYSTOLIC BLOOD PRESSURE: 122 MMHG | DIASTOLIC BLOOD PRESSURE: 60 MMHG | TEMPERATURE: 97.8 F | RESPIRATION RATE: 16 BRPM | OXYGEN SATURATION: 98 %

## 2024-05-15 PROCEDURE — G0299 HHS/HOSPICE OF RN EA 15 MIN: HCPCS

## 2024-05-15 ASSESSMENT — ENCOUNTER SYMPTOMS: DYSPNEA ACTIVITY LEVEL: AFTER AMBULATING MORE THAN 20 FT

## 2024-05-15 NOTE — HOME HEALTH
uploaded to media.    High alert medication teaching on aspirin and plavix, antiplatelet therapy education;purpose, dose, and frequency, food/drug interactions, risks of co-administration with other medications such as ASA, NSAID, and herbals, bleeding prevention strategies such as the use of a soft toothbrush, gentle flossing, use of electric razor, on the potential for increased bleeding from falls and lacerations, to notify medical providers of antiplatelet therapy, consider carrying an ID card, signs and symptoms of abnormal bleeding such as unexplained bruising, dizziness/lightheadedness, red or tarry looking stool, blood in urine, blood in vomit and to call home care agency and/or physician for any problems or concerns.    Patient at risk for falls Yes:   Recommended requesting PT/OT orders due to fall risk YES:   Patient response to recommended requesting of PT/OT orders: agreeable  Patient declines home health aide.     Interdisciplinary communication with: Keron Baires PT    Written Teaching Material Utilized: SOC/ADMISSION HANDBOOK AND HOSPITAL DISCHARGE INSTRUCTIONS    Clinician reviewed orientation to home health booklet with patient/caregiver including agency phone number, agency complaint process, state hotline number, as well as [employer] Commission's quality hotline number. Emergency preparedness reviewed with patient/caregiver in home health booklet. Consent forms signed    Patient/caregiver instructed on plan of care and are agreeable to plan of care at this time.      Plan of care and admission to home health status called to attending physician. The following practitioner has agreed to sign the ongoing POC DR.JASON WOO.    Discharge planning discussed with patient and caregiver. Discharge planning as follows: Will discharge when the patient has reached their maximum functional potential and maximum safety in their home Patient/caregiver did verbalize agreement with discharge planning.     Specific

## 2024-05-16 ENCOUNTER — HOME CARE VISIT (OUTPATIENT)
Facility: HOME HEALTH | Age: 89
End: 2024-05-16

## 2024-05-16 PROCEDURE — G0151 HHCP-SERV OF PT,EA 15 MIN: HCPCS

## 2024-05-17 VITALS
RESPIRATION RATE: 16 BRPM | DIASTOLIC BLOOD PRESSURE: 78 MMHG | TEMPERATURE: 97.2 F | HEART RATE: 78 BPM | SYSTOLIC BLOOD PRESSURE: 137 MMHG | OXYGEN SATURATION: 98 %

## 2024-05-17 NOTE — HOME HEALTH
Subjective: pt with no complaints today.   Falls since last visit: No  Caregiver involvement changes: No changes  Home health supplies by type and quantity ordered/delivered this visit include: N/A    Clinician asked if patient has had any physician contact since last home care visit and patient states: NO  Clinician asked if patient has any new or changed medications and patient states:  NO   If Yes, were medications reconciled? NA  Was the certifying physician notified of changes in medications? NA    Clinical assessment (what this visit means for the patient overall and need for ongoing skilled care) and progress or lack of progress towards SPECIFIC goals: Mr. Ruvalcaba needs skilled PT care s/p CVA to ensure safety with transfers and mobility.     Written Teaching Material Utilized: NA    Interdisciplinary communication with: N/A     Discharge planning as follows: Home/Self care    Specific plan for next visit: No further skilled PT is needed.

## 2024-05-18 VITALS
DIASTOLIC BLOOD PRESSURE: 58 MMHG | TEMPERATURE: 97.7 F | RESPIRATION RATE: 20 BRPM | SYSTOLIC BLOOD PRESSURE: 110 MMHG | HEART RATE: 77 BPM | OXYGEN SATURATION: 98 %

## 2024-05-18 NOTE — HOME HEALTH
Subjective: I wasnt able to use my left hand but im able to use it now .   Falls since last visit No(if yes complete the Fall Tracking Form and include bsrifallreport):   Caregiver involvement changes: No  Home health supplies by type and quantity ordered/delivered this visit include: N/A    Clinician asked if patient has had any physician contact since last home care visit and patient states: NO  Clinician asked if patient has any new or changed medications and patient states:  NO   If Yes, were medications reconciled? N/A  Was the certifying physician notified of changes in medications? NO     Clinical assessment (what this visit means for the patient overall and need for ongoing skilled care) and progress or lack of progress towards SPECIFIC goals: Pt with a hx of stroke and uti. Curently progressing towards stroke and uti pevention goals.  Medications reviewed with pt's daughter who manages pt's medications, All questions answered. SN still needed for assessment and education until goals are met.     Written Teaching Material Utilized: N/A    Interdisciplinary communication with: N/A     Discharge planning as follows: Is no longer homebound, Per physician order and When goals are met    Specific plan for next visit: assessment and education

## 2024-05-20 ENCOUNTER — HOME CARE VISIT (OUTPATIENT)
Facility: HOME HEALTH | Age: 89
End: 2024-05-20
Payer: MEDICARE

## 2024-05-20 VITALS
RESPIRATION RATE: 18 BRPM | DIASTOLIC BLOOD PRESSURE: 60 MMHG | HEART RATE: 77 BPM | OXYGEN SATURATION: 96 % | SYSTOLIC BLOOD PRESSURE: 128 MMHG | TEMPERATURE: 98.5 F

## 2024-05-20 PROCEDURE — G0299 HHS/HOSPICE OF RN EA 15 MIN: HCPCS

## 2024-05-20 NOTE — HOME HEALTH
Subjective: I'm feeling good.  Falls since last visit No(if yes complete the Fall Tracking Form and include bsrifallreport):   Caregiver involvement changes: n/a  Home health supplies by type and quantity ordered/delivered this visit include: n/a    Clinician asked if patient has had any physician contact since last home care visit and patient states: NO  Clinician asked if patient has any new or changed medications and patient states:  NO   If Yes, were medications reconciled? N/A   Was the certifying physician notified of changes in medications? N/A     Clinical assessment (what this visit means for the patient overall and need for ongoing skilled care) and progress or lack of progress towards SPECIFIC goals: Patient is making progress toward educational goals.  Skilled nursing neeeded for further medication education and disease process management education.    Written Teaching Material Utilized: N/A    Interdisciplinary communication with: Theresa Stevens RN for the purpose of poc collaboration    Discharge planning as follows: Is no longer homebound, Per physician order and When goals are met    Specific plan for next visit: education and assessment

## 2024-05-22 ENCOUNTER — ANCILLARY PROCEDURE (OUTPATIENT)
Age: 89
End: 2024-05-22
Payer: MEDICARE

## 2024-05-22 VITALS
WEIGHT: 191 LBS | SYSTOLIC BLOOD PRESSURE: 140 MMHG | HEART RATE: 76 BPM | DIASTOLIC BLOOD PRESSURE: 80 MMHG | BODY MASS INDEX: 28.29 KG/M2 | HEIGHT: 69 IN

## 2024-05-22 DIAGNOSIS — I25.10 CORONARY ARTERY DISEASE INVOLVING NATIVE CORONARY ARTERY OF NATIVE HEART WITHOUT ANGINA PECTORIS: ICD-10-CM

## 2024-05-22 DIAGNOSIS — Z95.1 S/P CABG (CORONARY ARTERY BYPASS GRAFT): ICD-10-CM

## 2024-05-22 DIAGNOSIS — I21.A1 TYPE 2 MI (MYOCARDIAL INFARCTION) (HCC): ICD-10-CM

## 2024-05-22 DIAGNOSIS — I10 ESSENTIAL (PRIMARY) HYPERTENSION: ICD-10-CM

## 2024-05-22 DIAGNOSIS — E78.00 HYPERCHOLESTEREMIA: ICD-10-CM

## 2024-05-22 DIAGNOSIS — Q24.8 LEFT VENTRICULAR OUTFLOW TRACT OBSTRUCTION: ICD-10-CM

## 2024-05-22 LAB
ECHO BSA: 2.05 M2
NUC STRESS EJECTION FRACTION: 62 %
STRESS ANGINA INDEX: 0
STRESS BASELINE DIAS BP: 80 MMHG
STRESS BASELINE HR: 79 BPM
STRESS BASELINE SYS BP: 140 MMHG
STRESS ESTIMATED WORKLOAD: 3.4 METS
STRESS EXERCISE DUR MIN: 5 MIN
STRESS EXERCISE DUR SEC: 0 SEC
STRESS O2 SAT PEAK: 98 %
STRESS O2 SAT REST: 94 %
STRESS PEAK DIAS BP: 80 MMHG
STRESS PEAK SYS BP: 210 MMHG
STRESS PERCENT HR ACHIEVED: 107 %
STRESS POST PEAK HR: 142 BPM
STRESS RATE PRESSURE PRODUCT: NORMAL BPM*MMHG
STRESS TARGET HR: 133 BPM
TID: 1.14

## 2024-05-22 PROCEDURE — PBSHW PBB SHADOW CHARGE: Performed by: SPECIALIST

## 2024-05-22 PROCEDURE — A9500 TC99M SESTAMIBI: HCPCS | Performed by: SPECIALIST

## 2024-05-22 PROCEDURE — 93016 CV STRESS TEST SUPVJ ONLY: CPT | Performed by: SPECIALIST

## 2024-05-22 PROCEDURE — 78452 HT MUSCLE IMAGE SPECT MULT: CPT | Performed by: SPECIALIST

## 2024-05-22 PROCEDURE — 93018 CV STRESS TEST I&R ONLY: CPT | Performed by: SPECIALIST

## 2024-05-22 RX ORDER — TETRAKIS(2-METHOXYISOBUTYLISOCYANIDE)COPPER(I) TETRAFLUOROBORATE 1 MG/ML
7.8 INJECTION, POWDER, LYOPHILIZED, FOR SOLUTION INTRAVENOUS
Status: COMPLETED | OUTPATIENT
Start: 2024-05-22 | End: 2024-05-22

## 2024-05-22 RX ORDER — TETRAKIS(2-METHOXYISOBUTYLISOCYANIDE)COPPER(I) TETRAFLUOROBORATE 1 MG/ML
24.2 INJECTION, POWDER, LYOPHILIZED, FOR SOLUTION INTRAVENOUS
Status: COMPLETED | OUTPATIENT
Start: 2024-05-22 | End: 2024-05-22

## 2024-05-22 RX ADMIN — TECHNETIUM TC-99M SESTAMIBI 24.2 MILLICURIE: 1 INJECTION INTRAVENOUS at 13:55

## 2024-05-22 RX ADMIN — TECHNETIUM TC-99M SESTAMIBI 7.8 MILLICURIE: 1 INJECTION INTRAVENOUS at 12:45

## 2024-05-22 NOTE — RESULT ENCOUNTER NOTE
Nuclear stress test is normal  Future Appointments  5/27/2024  Theresa Richard RN    [facility]  6/5/2024   Theresa Richard RN    [facility]  6/12/2024  Theresa Richard RN    [facility]  6/17/2024  Theresa Richard RN    [facility]  6/24/2024  Theresa Richard RN    [facility]  7/2/2024   11:00 AM   Filipe Johnston MD           [facility]                BS AMB  7/3/2024   Theresa Richard RN    [facility]  7/23/2024  10:00 AM   Olivia Feliz              BS AMB  12/12/2024 1:40 PM    Lorraine Cortés MD [facility]              BS AMB   Keep same plans for follow up and medications

## 2024-05-24 ENCOUNTER — TELEPHONE (OUTPATIENT)
Age: 89
End: 2024-05-24

## 2024-05-24 NOTE — TELEPHONE ENCOUNTER
----- Message from Lorraine Cortés MD sent at 5/22/2024  7:44 PM EDT -----  Nuclear stress test is normal  Future Appointments  5/27/2024  Theresa Richard RN    [facility]  6/5/2024   Theresa Richard RN    [facility]  6/12/2024  Theresa Richard RN    [facility]  6/17/2024  Theresa Richard RN    [facility]  6/24/2024  Theresa Richard RN    [facility]  7/2/2024   11:00 AM   Filipe Johnston MD           [facility]                BS AMB  7/3/2024   Theresa Richard RN    [facility]  7/23/2024  10:00 AM   Olivia Feliz# [facility]              BS AMB  12/12/2024 1:40 PM    Lorraine Cortés MD [facility]               AMB   Keep same plans for follow up and medications

## 2024-05-28 ENCOUNTER — HOME CARE VISIT (OUTPATIENT)
Facility: HOME HEALTH | Age: 89
End: 2024-05-28
Payer: MEDICARE

## 2024-05-28 PROCEDURE — G0299 HHS/HOSPICE OF RN EA 15 MIN: HCPCS

## 2024-05-28 NOTE — HOME HEALTH
Subjective:  I'm no longer have signs of a UTI  Falls since last visit No(if yes complete the Fall Tracking Form and include bsrifallreport):   Caregiver involvement changes: NO  Home health supplies by type and quantity ordered/delivered this visit include: N/A    Clinician asked if patient has had any physician contact since last home care visit and patient states: NO  Clinician asked if patient has any new or changed medications and patient states:  NO   If Yes, were medications reconciled? NO   Was the certifying physician notified of changes in medications? NO     Clinical assessment (what this visit means for the patient overall and need for ongoing skilled care) and progress or lack of progress towards SPECIFIC goals: Pt with a hx of UTI, stroke and MI. Currently meeting goals. Pt is taking medications as ordered, managing his BP and watching his diet. Pt with no complaints of any UTI or stroke like symptoms. SN  will plan for discharge next week. Pt and daughter in agreement.     Written Teaching Material Utilized: N/A    Interdisciplinary communication with: N/A     Discharge planning as follows: Is no longer homebound, Per physician order and When goals are met    Specific plan for next visit:  discharge

## 2024-05-30 ENCOUNTER — TRANSCRIBE ORDERS (OUTPATIENT)
Facility: HOSPITAL | Age: 89
End: 2024-05-30

## 2024-05-30 DIAGNOSIS — R97.20 ELEVATED PSA: ICD-10-CM

## 2024-05-30 DIAGNOSIS — M89.9 BONE LESION: Primary | ICD-10-CM

## 2024-05-30 DIAGNOSIS — C61 PROSTATE CANCER (HCC): ICD-10-CM

## 2024-05-30 PROBLEM — Z01.810 PREOP CARDIOVASCULAR EXAM: Status: RESOLVED | Noted: 2024-04-30 | Resolved: 2024-05-30

## 2024-05-31 ENCOUNTER — HOSPITAL ENCOUNTER (OUTPATIENT)
Facility: HOSPITAL | Age: 89
End: 2024-05-31
Attending: UROLOGY
Payer: MEDICARE

## 2024-05-31 DIAGNOSIS — M89.9 BONE LESION: ICD-10-CM

## 2024-05-31 DIAGNOSIS — R97.20 ELEVATED PSA: ICD-10-CM

## 2024-05-31 DIAGNOSIS — C61 PROSTATE CANCER (HCC): ICD-10-CM

## 2024-05-31 PROCEDURE — 78306 BONE IMAGING WHOLE BODY: CPT | Performed by: UROLOGY

## 2024-05-31 PROCEDURE — 3430000000 HC RX DIAGNOSTIC RADIOPHARMACEUTICAL: Performed by: UROLOGY

## 2024-05-31 PROCEDURE — A9503 TC99M MEDRONATE: HCPCS | Performed by: UROLOGY

## 2024-05-31 RX ORDER — TC 99M MEDRONATE 20 MG/10ML
20.2 INJECTION, POWDER, LYOPHILIZED, FOR SOLUTION INTRAVENOUS
Status: COMPLETED | OUTPATIENT
Start: 2024-05-31 | End: 2024-05-31

## 2024-05-31 RX ADMIN — TC 99M MEDRONATE 20.2 MILLICURIE: 20 INJECTION, POWDER, LYOPHILIZED, FOR SOLUTION INTRAVENOUS at 10:40

## 2024-06-07 ENCOUNTER — APPOINTMENT (OUTPATIENT)
Facility: HOME HEALTH | Age: 89
End: 2024-06-07
Payer: MEDICARE

## 2024-06-07 PROCEDURE — G0299 HHS/HOSPICE OF RN EA 15 MIN: HCPCS

## 2024-06-10 VITALS
OXYGEN SATURATION: 97 % | RESPIRATION RATE: 20 BRPM | DIASTOLIC BLOOD PRESSURE: 62 MMHG | TEMPERATURE: 98.2 F | SYSTOLIC BLOOD PRESSURE: 120 MMHG | HEART RATE: 73 BPM

## 2024-06-13 NOTE — HOME HEALTH
Subjective: Thank You for caring for me.  Falls since last visit No(if yes complete the Fall Tracking Form and include bsrifallreport):   Caregiver involvement changes: No    Clinician asked if patient has had any physician contact since last home care visit and patient states: NO  Clinician asked if patient has any new or changed medications and patient states:  NO   If Yes, were medications reconciled? NO   Was the certifying physician notified of changes in medications? NO     A list of reconciled medications has been given to the patient/caregiver  and uploaded to media.      Clinical assessment (what this visit means for the patient overall and need for ongoing skilled care) and progress or lack of progress towards SPECIFIC goals: Pt was seen by  after a short hospital stay for a stroke, possible MI, UTI and acute metabolic encephalopathy. Pt with residual weakness from stroke and denies any more symptoms of UTI. SN was seening pt for assessment and education on disease process and medication regimen. Pt has now met goals and is ready for nursing discharge    Discharge Instructions:Continue to take all medications as prescribed. Continue to monitor for signs of an impending stroke. If facial droop, slurred speech, numbness and tingling, weakness, confusion or signs of high BP occur, go the ER immediately. Monitor for signs of UTI, such as frequency, smell, burning and inform provider. Keep call scheduled appts and continue with a heart healthy diet.    Written Teaching Material Utilized: N/A    Instructed patient/caregiver on the following: Take all medications exactly as prescribed by physician. Updated medication list present in the home at discharge, Keep all scheduled medical appointments, Wash hands frequently to control the spread of infection, Follow safety and fall prevention education to prevent falls and Continue home exercises as instructed by your therapist    Call physician for: continuous pain,

## 2024-06-18 ENCOUNTER — TELEPHONE (OUTPATIENT)
Age: 89
End: 2024-06-18

## 2024-06-18 NOTE — TELEPHONE ENCOUNTER
Left voicemail on patient's daughter's line (Lilia) to remind patient that Dr. Crawford had wanted patient to have another CUS in two months, so that has an expected date of 6/24/24.    Gave number to Central scheduling and NIS

## 2024-06-19 RX ORDER — FINASTERIDE 5 MG/1
5 TABLET, FILM COATED ORAL DAILY
Qty: 30 TABLET | Refills: 5 | Status: SHIPPED | OUTPATIENT
Start: 2024-06-19

## 2024-06-19 NOTE — TELEPHONE ENCOUNTER
Patient's Daughter, Lilia states refill requested that was Never Received thru [facility] on file that patient needs refill done Asap for his Finasteride (PROSCAR) 5 MG tablet. Please call if any questions or to advise when done. Thank you      Lilia was reminded of [address] Turn Around on refills.

## 2024-06-19 NOTE — TELEPHONE ENCOUNTER
PCP: Filipe Johnston MD    Last appt: 5/13/2024   Future Appointments   Date Time Provider Department Center   7/2/2024 11:00 AM Filipe Johnston MD [facility] BS AMB   7/23/2024 10:00 AM Olivia Feliz, APRN - NP [facility] BS AMB   12/12/2024  1:40 PM Lorraine Cortés MD [facility] BS AMB       Requested Prescriptions     Pending Prescriptions Disp Refills    finasteride (PROSCAR) 5 MG tablet 30 tablet 0     Sig: Take 1 tablet by mouth daily     Prescribed in hospital. Next OV 7/2/2024

## 2024-06-24 RX ORDER — ATORVASTATIN CALCIUM 40 MG/1
40 TABLET, FILM COATED ORAL DAILY
Qty: 90 TABLET | Refills: 3 | Status: SHIPPED | OUTPATIENT
Start: 2024-06-24

## 2024-06-24 NOTE — TELEPHONE ENCOUNTER
PCP: Filipe Johnston MD    Last appt: 5/13/2024   Future Appointments   Date Time Provider Department Center   7/2/2024 11:00 AM Filipe Johnston MD [facility] BS [facility]   7/23/2024 10:00 AM Olivia Feliz, APRN - NP [facility] BS [facility]   12/12/2024  1:40 PM Lorraine Cortés MD [facility] BS AMB       Requested Prescriptions     Pending Prescriptions Disp Refills    atorvastatin (LIPITOR) 40 MG tablet 90 tablet 0     Sig: Take 1 tablet by mouth daily

## 2024-06-24 NOTE — TELEPHONE ENCOUNTER
Caller requests Refill of:  atorvastatin (LIPITOR) 40 MG tablet       Please send to:    [facility]/pharmacy #[id number] - [place], [place] - [address] - P [phone] - F [phone]  [address]  [facility] [address]  Phone: [phone] Fax: [phone]         Visit / Appointment History:  Future Appointment at [facility]:  7/2/2024   Last Appointment With PCP:  5/13/2024       Caller confirmed instructions and dosages as correct.    Caller was advised that Meds will be refilled as soon as possible, however there can be a 48-72 business hour turn around on refill requests.

## 2024-06-25 ENCOUNTER — TELEPHONE (OUTPATIENT)
Age: 89
End: 2024-06-25

## 2024-06-25 NOTE — TELEPHONE ENCOUNTER
Patient's daughter Lilia called in regards to a voicemail we left her a week ago about her dad's two month imaging being due.  Patient said she had called Central Scheduling and no one picked up.  I suggested she try at different time and then let us know when the imaging is scheduled for.    Patient's daughter acknowledged understanding.

## 2024-06-28 ENCOUNTER — HOSPITAL ENCOUNTER (OUTPATIENT)
Facility: HOSPITAL | Age: 89
End: 2024-06-28
Attending: RADIOLOGY
Payer: MEDICARE

## 2024-06-28 DIAGNOSIS — I65.23 BILATERAL CAROTID ARTERY STENOSIS: ICD-10-CM

## 2024-06-28 LAB
VAS LEFT CCA DIST EDV: 17.5 CM/S
VAS LEFT CCA DIST PSV: 102.3 CM/S
VAS LEFT CCA PROX EDV: 9.1 CM/S
VAS LEFT CCA PROX PSV: 134.5 CM/S
VAS LEFT ECA EDV: 0 CM/S
VAS LEFT ECA PSV: 159 CM/S
VAS LEFT ICA DIST EDV: 14.9 CM/S
VAS LEFT ICA DIST PSV: 73 CM/S
VAS LEFT ICA MID EDV: 11.6 CM/S
VAS LEFT ICA MID PSV: 96.4 CM/S
VAS LEFT ICA PROX EDV: 15.6 CM/S
VAS LEFT ICA PROX PSV: 92.4 CM/S
VAS LEFT ICA/CCA PSV: 0.94 NO UNITS
VAS LEFT SUBCLAVIAN PROX EDV: 0 CM/S
VAS LEFT SUBCLAVIAN PROX PSV: 155.8 CM/S
VAS LEFT VERTEBRAL EDV: 11.91 CM/S
VAS LEFT VERTEBRAL PSV: 35.2 CM/S
VAS RIGHT CCA DIST EDV: 6.8 CM/S
VAS RIGHT CCA DIST PSV: 50.4 CM/S
VAS RIGHT CCA PROX EDV: 11.4 CM/S
VAS RIGHT CCA PROX PSV: 122.8 CM/S
VAS RIGHT ECA EDV: 0 CM/S
VAS RIGHT ECA PSV: 125.3 CM/S
VAS RIGHT ICA DIST EDV: 11.6 CM/S
VAS RIGHT ICA DIST PSV: 56.8 CM/S
VAS RIGHT ICA MID EDV: 11.6 CM/S
VAS RIGHT ICA MID PSV: 60.1 CM/S
VAS RIGHT ICA PROX EDV: 6.8 CM/S
VAS RIGHT ICA PROX PSV: 66.5 CM/S
VAS RIGHT ICA/CCA PSV: 1.3 NO UNITS
VAS RIGHT SUBCLAVIAN PROX EDV: 0 CM/S
VAS RIGHT SUBCLAVIAN PROX PSV: 132.2 CM/S
VAS RIGHT VERTEBRAL EDV: 7.91 CM/S
VAS RIGHT VERTEBRAL PSV: 28.8 CM/S

## 2024-06-28 PROCEDURE — 93880 EXTRACRANIAL BILAT STUDY: CPT

## 2024-07-02 ENCOUNTER — TELEPHONE (OUTPATIENT)
Age: 89
End: 2024-07-02

## 2024-07-02 DIAGNOSIS — I65.23 BILATERAL CAROTID ARTERY STENOSIS: Primary | ICD-10-CM

## 2024-07-02 NOTE — TELEPHONE ENCOUNTER
Spoke to daughter who stated patient was not able to talk on the phone.  Daughter Lilia is on HIPAA.  Informed daughter of carotid Duplex results per Dr Crawford's note.  Daughter did not know if patient was on Plavix for cardiac issues.  Informed daughter I would notify provider for clarification.  Daughter stated understanding.  New order to be mailed to patient's address verified by daughter.

## 2024-07-02 NOTE — TELEPHONE ENCOUNTER
----- Message from Lawrence Crawford MD sent at 7/1/2024  4:30 PM EDT -----  Please call patient and let him know that the ultrasound looks fine.   Let's get another duplex in 3 months.  He may continue aspirin and stop the Plavix from NIS standpoint, but if his cardiologist requires him to be on both the aspirin and Plavix, then he should continue both.  Telephone follow up visit in 3 months.  (I see that he is scheduled to see me in August to review the current results-- please cancel that appointment)  Thanks.  ----- Message -----  From: Immanuel Pompa MD  Sent: 6/28/2024   3:11 PM EDT  To: Lawrence Crawford MD

## 2024-07-02 NOTE — TELEPHONE ENCOUNTER
Patient's Daughter, Lilia, states she needs a call back in reference to Cancelled appt for today, 7/2/24 & Next appt is not until November. Please call  to discuss. Thank you

## 2024-07-08 RX ORDER — METOPROLOL SUCCINATE 25 MG/1
25 TABLET, EXTENDED RELEASE ORAL DAILY
Qty: 30 TABLET | Refills: 5 | Status: SHIPPED | OUTPATIENT
Start: 2024-07-08

## 2024-07-08 RX ORDER — TAMSULOSIN HYDROCHLORIDE 0.4 MG/1
0.4 CAPSULE ORAL DAILY
Qty: 30 CAPSULE | Refills: 5 | Status: SHIPPED | OUTPATIENT
Start: 2024-07-08

## 2024-07-08 NOTE — TELEPHONE ENCOUNTER
PCP: Filipe Johnston MD    Last appt: 5/13/2024   Future Appointments   Date Time Provider Department Center   7/17/2024 11:30 AM Filipe Johnston MD [facility] BS AMB   7/23/2024 10:00 AM Olivia Feliz APRN - [facility] BS AMB   9/26/2024 11:00 AM [facility] VASCULAR ROOM 1 [facility]   11/18/2024 10:15 AM Filipe Johnston MD [facility] BS AMB   12/12/2024  1:40 PM Lorraine Cortés MD [facility] BS AMB       Requested Prescriptions     Pending Prescriptions Disp Refills    tamsulosin (FLOMAX) 0.4 MG capsule 30 capsule 0     Sig: Take 1 capsule by mouth daily

## 2024-07-08 NOTE — TELEPHONE ENCOUNTER
Patient's Daughter, Lilia states patient needs refill done thru [facility] on file for his Tamsulosin (FLOMAX) 0.4 MG capsule. Please call if any questions. Thank you    Lilia reminded of [address] on refills

## 2024-07-08 NOTE — TELEPHONE ENCOUNTER
PCP: Filipe Johnston MD    Last appt: 5/13/2024   Future Appointments   Date Time Provider Department Center   7/17/2024 11:30 AM Filipe Johnston MD [facility] BS [facility]   7/23/2024 10:00 AM Olivia Feliz APRN - [facility] BS AMB   11/18/2024 10:15 AM Filipe Johnston MD [facility] BS AMB   12/12/2024  1:40 PM Lorraine Cortés MD [facility] BS AMB       Requested Prescriptions     Pending Prescriptions Disp Refills    metoprolol succinate (TOPROL XL) 25 MG extended release tablet 30 tablet 3     Sig: Take 3 tablets by mouth daily

## 2024-07-08 NOTE — TELEPHONE ENCOUNTER
metoprolol succinate (TOPROL XL) 25 MG extended release tablet    Refill to [facility] #[phone] [facility]     Pt only has 2 pills left

## 2024-07-17 ENCOUNTER — OFFICE VISIT (OUTPATIENT)
Age: 89
End: 2024-07-17
Payer: MEDICARE

## 2024-07-17 VITALS
TEMPERATURE: 97 F | OXYGEN SATURATION: 98 % | BODY MASS INDEX: 30.9 KG/M2 | HEIGHT: 69 IN | SYSTOLIC BLOOD PRESSURE: 124 MMHG | DIASTOLIC BLOOD PRESSURE: 66 MMHG | WEIGHT: 208.6 LBS | HEART RATE: 88 BPM | RESPIRATION RATE: 16 BRPM

## 2024-07-17 DIAGNOSIS — Z86.73 HISTORY OF CEREBROVASCULAR ACCIDENT: Primary | ICD-10-CM

## 2024-07-17 DIAGNOSIS — E78.5 HYPERLIPIDEMIA, UNSPECIFIED HYPERLIPIDEMIA TYPE: ICD-10-CM

## 2024-07-17 DIAGNOSIS — R97.20 ELEVATED PSA: ICD-10-CM

## 2024-07-17 DIAGNOSIS — I25.10 CORONARY ARTERY DISEASE INVOLVING NATIVE CORONARY ARTERY OF NATIVE HEART WITHOUT ANGINA PECTORIS: ICD-10-CM

## 2024-07-17 DIAGNOSIS — I10 HYPERTENSION, UNSPECIFIED TYPE: ICD-10-CM

## 2024-07-17 DIAGNOSIS — R41.89 COGNITIVE IMPAIRMENT: ICD-10-CM

## 2024-07-17 PROCEDURE — 99214 OFFICE O/P EST MOD 30 MIN: CPT | Performed by: INTERNAL MEDICINE

## 2024-07-17 RX ORDER — FOLIC ACID 1 MG/1
1 TABLET ORAL DAILY
Qty: 90 TABLET | Refills: 3 | Status: SHIPPED | OUTPATIENT
Start: 2024-07-17

## 2024-07-17 SDOH — ECONOMIC STABILITY: FOOD INSECURITY: WITHIN THE PAST 12 MONTHS, YOU WORRIED THAT YOUR FOOD WOULD RUN OUT BEFORE YOU GOT MONEY TO BUY MORE.: NEVER TRUE

## 2024-07-17 SDOH — ECONOMIC STABILITY: FOOD INSECURITY: WITHIN THE PAST 12 MONTHS, THE FOOD YOU BOUGHT JUST DIDN'T LAST AND YOU DIDN'T HAVE MONEY TO GET MORE.: NEVER TRUE

## 2024-07-17 SDOH — ECONOMIC STABILITY: INCOME INSECURITY: HOW HARD IS IT FOR YOU TO PAY FOR THE VERY BASICS LIKE FOOD, HOUSING, MEDICAL CARE, AND HEATING?: NOT HARD AT ALL

## 2024-07-17 NOTE — PROGRESS NOTES
HISTORY OF PRESENT ILLNESS   Yosef Ruvalcaba   is a 89 y.o.  male.  Hx htn hld cad s/p cabg 1990'a, lvot obstruction obesity ckd 3 hx CVA 3/2024 s/p with ICA stent-left arm weakness. Elevated /prostamegaly on CT-here with daughter Lilia     Off plavix for left ICA stent placed 3/2024 -on aspirin  Had neg NST in May    [facility] Dr Dooley-had bone scan c/w pagets dz of left hemipelvis, no evidenece for metastatic prostate cancer  Prostate bx was held while on DAPT but daughter states his last PSA dropped from 200 down to 80 -will get repeat PSA at [facility] again    Nephro Dr JORGITO Hernandez    Comprehension is poor per daughter Lilia but independent all ADL's except pt no longer drives a car  He lives with his daughter  Last OV     Hospital fu-admitted 4/27/4/30 for AMS , lethargy , right leg weakness and UTI-klebsiella tx rocephin  Elevated troponin in setting of ckd and uti a. LVOT-BB only for HTN. Brilinta changed to plavix  Saw CARD Dr Cortés and scheduled for NST  Head CT neg  Out pt fu prostatamegaly and ---unable to get prostate bx until completes 3 months of DAPT  Had OV with Neurointerventional -will get duplex in a few months and referred to [facility] for hx PAD /absent pedal pulses. Pt denies leg pain when walking but not walking far pt daughter  He is getting HH nursing and PT  Walking ok  Slight right hand weakness but mostly improved  No complainrs     Date of Admission: 4/27/2024  Date of Discharge: 4/30/2024  Attending Physician: Dr. Mendel     Discharge Diagnosis:   Elevated troponin, likely type II MI in the setting of LVOT/worsening CKD/UTI  CAD status post CABG on 2015  Hypertension/hyperlipidemia  LVOT obstruction  Toxic metabolic encephalopathy  New right lower extremity weakness  Failure to thrive  Metabolic encephalopathy versus worsening stroke  Severe right ICA stenosis status post stent on 3/29,  Recent right MCA stroke  UTI  Leukocytosis/fever  Sepsis d/t above   BPH  Worsening CKD stage III,

## 2024-07-17 NOTE — PROGRESS NOTES
\"Have you been to the ER, urgent care clinic since your last visit?  Hospitalized since your last visit?\"    NO    “Have you seen or consulted any other health care providers outside of [facility] since your last visit?”    NO            Click Here for Release of Records Request

## 2024-07-22 NOTE — PROGRESS NOTES
05/22/24 86.6 kg (191 lb)      BP Readings from Last 3 Encounters:   07/23/24 136/72   07/17/24 124/66   06/07/24 120/62        Current Outpatient Medications:     clopidogrel (PLAVIX) 75 MG tablet, Take 1 tablet by mouth daily, Disp: 30 tablet, Rfl: 0    folic acid (FOLVITE) 1 MG tablet, Take 1 tablet by mouth daily, Disp: 90 tablet, Rfl: 3    metoprolol succinate (TOPROL XL) 25 MG extended release tablet, Take 1 tablet by mouth daily, Disp: 30 tablet, Rfl: 5    tamsulosin (FLOMAX) 0.4 MG capsule, Take 1 capsule by mouth daily, Disp: 30 capsule, Rfl: 5    atorvastatin (LIPITOR) 40 MG tablet, Take 1 tablet by mouth daily, Disp: 90 tablet, Rfl: 3    finasteride (PROSCAR) 5 MG tablet, Take 1 tablet by mouth daily, Disp: 30 tablet, Rfl: 5    aspirin 81 MG chewable tablet, Take 1 tablet by mouth daily, Disp: 30 tablet, Rfl: 3     LOWELL Padgett, [facility]  [facility]     [facility] [facility], [address]  [place] [address]  (O) [phone] (Fax) [phone]     [address] [facility], [address]  [place] [address] (O) [phone]

## 2024-07-23 ENCOUNTER — OFFICE VISIT (OUTPATIENT)
Age: 89
End: 2024-07-23
Payer: MEDICARE

## 2024-07-23 VITALS
HEART RATE: 91 BPM | BODY MASS INDEX: 30.81 KG/M2 | WEIGHT: 208 LBS | HEIGHT: 69 IN | SYSTOLIC BLOOD PRESSURE: 136 MMHG | DIASTOLIC BLOOD PRESSURE: 72 MMHG | OXYGEN SATURATION: 97 %

## 2024-07-23 DIAGNOSIS — Q24.8 LEFT VENTRICULAR OUTFLOW TRACT OBSTRUCTION: ICD-10-CM

## 2024-07-23 DIAGNOSIS — I67.9 CEREBRAL VASCULAR DISEASE: ICD-10-CM

## 2024-07-23 DIAGNOSIS — I25.10 CORONARY ARTERY DISEASE INVOLVING NATIVE CORONARY ARTERY OF NATIVE HEART WITHOUT ANGINA PECTORIS: Primary | ICD-10-CM

## 2024-07-23 DIAGNOSIS — I10 ESSENTIAL (PRIMARY) HYPERTENSION: ICD-10-CM

## 2024-07-23 PROCEDURE — 99214 OFFICE O/P EST MOD 30 MIN: CPT | Performed by: NURSE PRACTITIONER

## 2024-07-23 PROCEDURE — 1123F ACP DISCUSS/DSCN MKR DOCD: CPT | Performed by: NURSE PRACTITIONER

## 2024-07-23 PROCEDURE — 1036F TOBACCO NON-USER: CPT | Performed by: NURSE PRACTITIONER

## 2024-07-23 PROCEDURE — G8427 DOCREV CUR MEDS BY ELIG CLIN: HCPCS | Performed by: NURSE PRACTITIONER

## 2024-07-23 PROCEDURE — G8417 CALC BMI ABV UP PARAM F/U: HCPCS | Performed by: NURSE PRACTITIONER

## 2024-07-23 RX ORDER — CLOPIDOGREL BISULFATE 75 MG/1
75 TABLET ORAL DAILY
Qty: 30 TABLET | Refills: 0 | Status: SHIPPED | OUTPATIENT
Start: 2024-07-23 | End: 2024-07-24 | Stop reason: HOSPADM

## 2024-07-23 NOTE — PATIENT INSTRUCTIONS
I'll ask Dr. Crawford about the clopidogrel/plavix  If you do not hear back from me in 2 weeks please call the office and ask the call center to send me a message about Mr. Ruvalcaba's clopidogrel

## 2024-07-24 ENCOUNTER — TELEPHONE (OUTPATIENT)
Age: 89
End: 2024-07-24

## 2024-07-24 NOTE — TELEPHONE ENCOUNTER
Please call patient's daughter and let her know that Dr. Crawford said he can stop his plavix, and it is ok from a cardiology standpoint that he stop his plavix.

## 2024-07-25 NOTE — TELEPHONE ENCOUNTER
Verified patient with two types of identifiers. Gave Ms. Lilia Weathers's message. She was appreciative.     Future Appointments   Date Time Provider Department Center   9/26/2024 11:00 AM [facility] VASCULAR ROOM 1 [facility]   12/6/2024 11:30 AM Filipe Johnston MD [facility] BS AMB   12/12/2024  1:40 PM Lorraine Cortés MD [facility] BS AMB

## 2024-07-26 ENCOUNTER — TELEPHONE (OUTPATIENT)
Age: 89
End: 2024-07-26

## 2024-07-26 NOTE — TELEPHONE ENCOUNTER
Lilia needs to know if pt should continue this medication or stop?      Pt has three pills left.     folic acid (FOLVITE) 1 MG tablet    Refill to  [facility] #[phone]    She will call pharm to see if it is there.  If Dr. Johnston wants to continue.      Pt was taken off a blood thinner she wants you to know and states that doctor knows which one.

## 2024-07-26 NOTE — TELEPHONE ENCOUNTER
Attempted to reach patient. Left message on vm to return call    Rx was sent to pharmacy at last OV by PCP

## 2024-07-29 NOTE — TELEPHONE ENCOUNTER
Spoke with patient wife, Lilia. Advised rx was sent to pharmacy at last OV for refills. Advised to contact pharmacy. Verbalized understanding     Wife states pt was taken off plavix by cardiologist MARILY.

## 2024-09-09 RX ORDER — FINASTERIDE 5 MG/1
5 TABLET, FILM COATED ORAL DAILY
Qty: 90 TABLET | Refills: 3 | Status: SHIPPED | OUTPATIENT
Start: 2024-09-09

## 2024-09-26 ENCOUNTER — HOSPITAL ENCOUNTER (OUTPATIENT)
Facility: HOSPITAL | Age: 89
Discharge: HOME | End: 2024-09-28
Attending: RADIOLOGY
Payer: MEDICARE

## 2024-09-26 DIAGNOSIS — I65.23 BILATERAL CAROTID ARTERY STENOSIS: ICD-10-CM

## 2024-09-26 LAB
VAS LEFT CCA DIST EDV: 14.2 CM/S
VAS LEFT CCA DIST PSV: 100.2 CM/S
VAS LEFT CCA PROX EDV: 14.4 CM/S
VAS LEFT CCA PROX PSV: 111.2 CM/S
VAS LEFT ECA EDV: 0 CM/S
VAS LEFT ECA PSV: 140.2 CM/S
VAS LEFT ICA DIST EDV: 7.9 CM/S
VAS LEFT ICA DIST PSV: 31 CM/S
VAS LEFT ICA MID EDV: 14.3 CM/S
VAS LEFT ICA MID PSV: 56.5 CM/S
VAS LEFT ICA PROX EDV: 16.7 CM/S
VAS LEFT ICA PROX PSV: 62.1 CM/S
VAS LEFT ICA/CCA PSV: 0.6 NO UNITS
VAS LEFT SUBCLAVIAN PROX EDV: 0 CM/S
VAS LEFT SUBCLAVIAN PROX PSV: 127.1 CM/S
VAS LEFT VERTEBRAL EDV: 14.5 CM/S
VAS LEFT VERTEBRAL PSV: 45 CM/S
VAS RIGHT CCA DIST EDV: 13 CM/S
VAS RIGHT CCA DIST PSV: 102.7 CM/S
VAS RIGHT CCA PROX EDV: 17.5 CM/S
VAS RIGHT CCA PROX PSV: 134.3 CM/S
VAS RIGHT ECA EDV: 0 CM/S
VAS RIGHT ECA PSV: 109.4 CM/S
VAS RIGHT ICA DIST EDV: 10.4 CM/S
VAS RIGHT ICA DIST PSV: 45.3 CM/S
VAS RIGHT ICA MID EDV: 13.1 CM/S
VAS RIGHT ICA MID PSV: 58.1 CM/S
VAS RIGHT ICA PROX EDV: 9.8 CM/S
VAS RIGHT ICA PROX PSV: 63.6 CM/S
VAS RIGHT ICA/CCA PSV: 0.6 NO UNITS
VAS RIGHT SUBCLAVIAN PROX EDV: 0 CM/S
VAS RIGHT SUBCLAVIAN PROX PSV: 111.2 CM/S
VAS RIGHT VERTEBRAL EDV: 9.2 CM/S
VAS RIGHT VERTEBRAL PSV: 34.1 CM/S

## 2024-09-26 PROCEDURE — 93880 EXTRACRANIAL BILAT STUDY: CPT

## 2024-09-26 RX ORDER — ATORVASTATIN CALCIUM 40 MG/1
40 TABLET, FILM COATED ORAL DAILY
Qty: 90 TABLET | Refills: 3 | Status: SHIPPED | OUTPATIENT
Start: 2024-09-26

## 2024-09-30 ENCOUNTER — TELEPHONE (OUTPATIENT)
Age: 89
End: 2024-09-30

## 2024-10-04 RX ORDER — METOPROLOL SUCCINATE 25 MG/1
25 TABLET, EXTENDED RELEASE ORAL DAILY
Qty: 90 TABLET | Refills: 3 | Status: SHIPPED | OUTPATIENT
Start: 2024-10-04

## 2024-10-04 NOTE — TELEPHONE ENCOUNTER
metoprolol succinate (TOPROL XL) 25 MG extended release tablet    Refill to [facility] #[phone]

## 2024-10-04 NOTE — TELEPHONE ENCOUNTER
PCP: Filipe Johnston MD    Last appt: 7/17/2024   Future Appointments   Date Time Provider Department Center   12/6/2024 11:30 AM Filipe Johnston MD [address]   12/12/2024  1:40 PM Lorraine Cortés MD [facility]       Requested Prescriptions     Pending Prescriptions Disp Refills    metoprolol succinate (TOPROL XL) 25 MG extended release tablet 90 tablet 0     Sig: Take 1 tablet by mouth daily

## 2024-10-09 RX ORDER — TAMSULOSIN HYDROCHLORIDE 0.4 MG/1
0.4 CAPSULE ORAL DAILY
Qty: 90 CAPSULE | Refills: 3 | Status: SHIPPED | OUTPATIENT
Start: 2024-10-09

## 2024-10-09 RX ORDER — FOLIC ACID 1 MG/1
1 TABLET ORAL DAILY
Qty: 90 TABLET | Refills: 3 | Status: SHIPPED | OUTPATIENT
Start: 2024-10-09

## 2024-10-09 NOTE — TELEPHONE ENCOUNTER
Spoke with patient daughter, Lilia. States needs refill on patient medication flomax and folic acid.   Rx pended.     PCP: Filipe Johnston MD    Last appt: 7/17/2024   Future Appointments   Date Time Provider Department Center   12/6/2024 11:30 AM Filipe Johnston MD [facility]   12/12/2024  1:40 PM [facility]       Requested Prescriptions     Pending Prescriptions Disp Refills    folic acid (FOLVITE) 1 MG tablet 90 tablet 0     Sig: Take 1 tablet by mouth daily    tamsulosin (FLOMAX) 0.4 MG capsule 90 capsule 0     Sig: Take 1 capsule by mouth daily

## 2024-10-10 ENCOUNTER — TELEPHONE (OUTPATIENT)
Age: 89
End: 2024-10-10

## 2024-10-23 ENCOUNTER — TELEPHONE (OUTPATIENT)
Age: 89
End: 2024-10-23

## 2024-10-23 NOTE — TELEPHONE ENCOUNTER
Left voicemail for patient's daughter (Lilia), letting them know that we need to cancel tomorrow's telehealth appointment with Dr. Crawford due to provider being out sick. Requested call back so that we can reschedule this appointment.

## 2024-10-24 ENCOUNTER — TELEPHONE (OUTPATIENT)
Age: 89
End: 2024-10-24

## 2024-10-24 NOTE — TELEPHONE ENCOUNTER
Message left to inform patient today's appointment was cancelled due to provider's illness.  Asked patient to call office to confirm receipt of message.

## 2024-10-28 ENCOUNTER — TELEMEDICINE (OUTPATIENT)
Age: 89
End: 2024-10-28
Payer: MEDICARE

## 2024-10-28 DIAGNOSIS — I65.23 BILATERAL CAROTID ARTERY STENOSIS: Primary | ICD-10-CM

## 2024-10-28 PROCEDURE — 1159F MED LIST DOCD IN RCRD: CPT | Performed by: NURSE PRACTITIONER

## 2024-10-28 PROCEDURE — 99441 PR PHYS/QHP TELEPHONE EVALUATION 5-10 MIN: CPT | Performed by: NURSE PRACTITIONER

## 2024-10-28 NOTE — PROGRESS NOTES
Neurointerventional Surgery Clinic Note    Yosef Ruvalcaba is a 89 y.o. male established patient who was seen by telephone on 10/28/2024.      Consent: Yosef Ruvalcaba, who was seen by telephone, and/or his healthcare decision maker, is aware that this patient-initiated, Telehealth encounter on 10/28/2024 is a billable service, with coverage as determined by his insurance carrier. He is aware that he may receive a bill and has provided verbal consent to proceed: yes.      Assessment & Plan:     Yosef Ruvalcaba is a 88 y.o. male established patient with history of hypertension, hyperlipidemia, CAD status post CABG in 2000, and recent stroke caused by symptomatic right carotid stenosis which was treated with angioplasty and stenting on 3/29/2024. He presents today via telephone visit, his daughter Lilia is present. Pt is doing well overall, however, his daughter reports he has some memory issues.     Follow up CUS 6 months post op (end of March 2025).      Reviewed today with the patient:   - Pt to continue on ASA 81 mg daily + Lipitor 40 mg hs   - Strict BP control (less than 140/90 long term)    I have spent at least 6 minutes on the telephone in discussion with the patient.  In addition, I have reviewed previous notes and test results as well as performed documentation on the day of the visit.     Subjective:   Yosef Ruvalcaba is a 89 y.o. male Yosef Ruvalcaba is a 88 y.o. male established patient with history of hypertension, hyperlipidemia, CAD status post CABG in 2000, and recent stroke caused by symptomatic right carotid stenosis which was treated with angioplasty and stenting on 3/29/2024.     Chief Complaint   Patient presents with    6 Month Follow-Up     Carotid imaging     Prior to Admission medications    Medication Sig Start Date End Date Taking? Authorizing Provider   folic acid (FOLVITE) 1 MG tablet Take 1 tablet by mouth daily 10/9/24   Filipe Johnston MD   tamsulosin (FLOMAX) 0.4 MG capsule Take 1 
Phone call to patient in preparation for Virtual/phone visit with provider.  Name and  verified.  Patient unable to obtain vital signs at home.  Daughter a call with patient.  Patient reports no problems.  Denies headaches, dizziness, numbness or tingling, neck pain or stiffness.  No new problems reported.  
daily 90 tablet 3    finasteride (PROSCAR) 5 MG tablet Take 1 tablet by mouth daily 90 tablet 3    aspirin 81 MG chewable tablet Take 1 tablet by mouth daily 30 tablet 3     No current facility-administered medications for this visit.     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 The ejection fraction was 50-60%    CORONARY ARTERY BYPASS GRAFT  2000    ECHO 2D ADULT  2001    LVH, normal LV wall motion and ejection fraction and no significant valvular pathology.    IR CAROTID STENT W PROTECTION  3/29/2024    IR CAROTID STENT W PROTECTION 3/29/2024 [facility] RAD ANGIO IR     Family History   Family history unknown: Yes     Social History     Tobacco Use    Smoking status: Never    Smokeless tobacco: Never   Substance Use Topics    Alcohol use: No       Review of Systems  : Pertinent ROS included in HPI    Objective:   Vital Signs: (As obtained by patient/caregiver at home)  There were no vitals taken for this visit.     Physical exam could not be performed.    We discussed the expected course, resolution and complications of the diagnosis(es) in detail.  Medication risks, benefits, costs, interactions, and alternatives were discussed as indicated.  I advised him to contact the office if his condition worsens, changes or fails to improve as anticipated. He expressed understanding with the diagnosis(es) and plan.     Yosef Ruvalcaba was evaluated through a telephone encounter. The patient (or guardian if applicable) is aware that this is a billable service, which includes applicable co-pays. This Virtual Visit was

## 2024-12-06 ENCOUNTER — OFFICE VISIT (OUTPATIENT)
Age: 89
End: 2024-12-06
Payer: MEDICARE

## 2024-12-06 VITALS
HEART RATE: 83 BPM | DIASTOLIC BLOOD PRESSURE: 70 MMHG | BODY MASS INDEX: 31.46 KG/M2 | TEMPERATURE: 97.3 F | WEIGHT: 212.4 LBS | SYSTOLIC BLOOD PRESSURE: 138 MMHG | RESPIRATION RATE: 18 BRPM | OXYGEN SATURATION: 97 % | HEIGHT: 69 IN

## 2024-12-06 DIAGNOSIS — Z00.00 MEDICARE ANNUAL WELLNESS VISIT, SUBSEQUENT: ICD-10-CM

## 2024-12-06 DIAGNOSIS — I10 HYPERTENSION, UNSPECIFIED TYPE: ICD-10-CM

## 2024-12-06 DIAGNOSIS — E78.5 HYPERLIPIDEMIA, UNSPECIFIED HYPERLIPIDEMIA TYPE: ICD-10-CM

## 2024-12-06 DIAGNOSIS — Z98.890 HISTORY OF LEFT COMMON CAROTID ARTERY STENT PLACEMENT: Primary | ICD-10-CM

## 2024-12-06 DIAGNOSIS — Z95.828 HISTORY OF LEFT COMMON CAROTID ARTERY STENT PLACEMENT: Primary | ICD-10-CM

## 2024-12-06 DIAGNOSIS — R97.20 ELEVATED PSA: ICD-10-CM

## 2024-12-06 PROCEDURE — 99214 OFFICE O/P EST MOD 30 MIN: CPT | Performed by: INTERNAL MEDICINE

## 2024-12-06 ASSESSMENT — PATIENT HEALTH QUESTIONNAIRE - PHQ9
SUM OF ALL RESPONSES TO PHQ9 QUESTIONS 1 & 2: 0
1. LITTLE INTEREST OR PLEASURE IN DOING THINGS: NOT AT ALL
SUM OF ALL RESPONSES TO PHQ QUESTIONS 1-9: 0
SUM OF ALL RESPONSES TO PHQ QUESTIONS 1-9: 0
2. FEELING DOWN, DEPRESSED OR HOPELESS: NOT AT ALL
SUM OF ALL RESPONSES TO PHQ QUESTIONS 1-9: 0
SUM OF ALL RESPONSES TO PHQ QUESTIONS 1-9: 0

## 2024-12-06 NOTE — PROGRESS NOTES
Patient identified with two identification factors, Name and Date of Birth.    Chief Complaint   Patient presents with    Medicare AWV       /70 (Site: Right Upper Arm, Position: Sitting, Cuff Size: Large Adult)   Pulse 83   Temp 97.3 °F (36.3 °C) (Temporal)   Resp 18   Ht 1.753 m (5' 9\")   Wt 96.3 kg (212 lb 6.4 oz)   SpO2 97%   BMI 31.37 kg/m²       1. \"Have you been to the ER, urgent care clinic since your last visit?  Hospitalized since your last visit?\" No     2. \"Have you seen or consulted any other health care providers outside of the [facility] since your last visit?\" No     
       Normal  5.7 - 6.4         Consider Prediabetes  >6.5              Consider Diabetes          Review of Systems     Physical Exam  Constitutional:       Appearance: Normal appearance. He is obese.   HENT:      Head: Normocephalic.      Right Ear: Tympanic membrane normal.      Left Ear: Tympanic membrane normal.      Nose: Nose normal.   Cardiovascular:      Rate and Rhythm: Normal rate and regular rhythm.      Pulses: Normal pulses.   Pulmonary:      Effort: Pulmonary effort is normal.   Abdominal:      General: Abdomen is flat. Bowel sounds are normal.   Musculoskeletal:         General: Normal range of motion.      Cervical back: Normal range of motion.      Right lower leg: No edema.   Neurological:      General: No focal deficit present.      Mental Status: He is alert.   Psychiatric:         Mood and Affect: Mood normal.         Behavior: Behavior normal.         Thought Content: Thought content normal.         Judgment: Judgment normal.          ASSESSMENT and PLAN  There are no diagnoses linked to this encounter.   Yosef was seen today for medicare awv.    Diagnoses and all orders for this visit:    History of left common carotid artery stent placement  -     Lawrence Rueda MD, Neurointerventional Surgery [place] ([facility])   Needs fu appt in March  Hypertension, unspecified type   controlled  Hyperlipidemia, unspecified hyperlipidemia type  -     Lipid Panel; Future  -     CBC; Future  -     Basic Metabolic Panel; Future  -     Basic Metabolic Panel  -     CBC  -     Lipid Panel   Goal LDL <70  Elevated PSA  -     PSA, Diagnostic; Future  -     PSA, Diagnostic   FU [facility]  Memory loss   Passed mincog   Preformed serial 3 in office without difficulty    RTC 6 months     Filipe Johnston MD Medicare Annual Wellness Visit    Yosef Ruvalcaba is here for Medicare AWV    Assessment & Plan   History of left common carotid artery stent placement  -     Lawrence Rueda MD, Neurointerventional

## 2024-12-07 LAB
ANION GAP SERPL CALC-SCNC: 4 MMOL/L (ref 2–12)
BUN SERPL-MCNC: 18 MG/DL (ref 6–20)
BUN/CREAT SERPL: 12 (ref 12–20)
CALCIUM SERPL-MCNC: 9.5 MG/DL (ref 8.5–10.1)
CHLORIDE SERPL-SCNC: 107 MMOL/L (ref 97–108)
CHOLEST SERPL-MCNC: 133 MG/DL
CO2 SERPL-SCNC: 29 MMOL/L (ref 21–32)
CREAT SERPL-MCNC: 1.49 MG/DL (ref 0.7–1.3)
ERYTHROCYTE [DISTWIDTH] IN BLOOD BY AUTOMATED COUNT: 14.3 % (ref 11.5–14.5)
GLUCOSE SERPL-MCNC: 81 MG/DL (ref 65–100)
HCT VFR BLD AUTO: 44 % (ref 36.6–50.3)
HDLC SERPL-MCNC: 46 MG/DL
HDLC SERPL: 2.9 (ref 0–5)
HGB BLD-MCNC: 14 G/DL (ref 12.1–17)
LDLC SERPL CALC-MCNC: 63.6 MG/DL (ref 0–100)
MCH RBC QN AUTO: 29.3 PG (ref 26–34)
MCHC RBC AUTO-ENTMCNC: 31.8 G/DL (ref 30–36.5)
MCV RBC AUTO: 92.1 FL (ref 80–99)
NRBC # BLD: 0 K/UL (ref 0–0.01)
NRBC BLD-RTO: 0 PER 100 WBC
PLATELET # BLD AUTO: 164 K/UL (ref 150–400)
PMV BLD AUTO: 12 FL (ref 8.9–12.9)
POTASSIUM SERPL-SCNC: 4.6 MMOL/L (ref 3.5–5.1)
PSA SERPL-MCNC: 186 NG/ML (ref 0.01–4)
RBC # BLD AUTO: 4.78 M/UL (ref 4.1–5.7)
SODIUM SERPL-SCNC: 140 MMOL/L (ref 136–145)
TRIGL SERPL-MCNC: 117 MG/DL
VLDLC SERPL CALC-MCNC: 23.4 MG/DL
WBC # BLD AUTO: 7.2 K/UL (ref 4.1–11.1)

## 2024-12-09 ENCOUNTER — TELEPHONE (OUTPATIENT)
Age: 89
End: 2024-12-09

## 2024-12-09 DIAGNOSIS — R97.20 ELEVATED PROSTATE SPECIFIC ANTIGEN LESS THAN 10 NG/ML: Primary | ICD-10-CM

## 2024-12-09 NOTE — TELEPHONE ENCOUNTER
PSA is high Lilia states.     She would like to speak with Dr. Johnston pertaining to appt with specialist.     Please call Lilia.

## 2024-12-09 NOTE — TELEPHONE ENCOUNTER
Spoke with PCP in office. Ok with patient seeing urology 2/2025. Daughter verbalized understanding.

## 2024-12-09 NOTE — TELEPHONE ENCOUNTER
Patient's daughter (Lilia Ruvalcaba) called stating that her dad had seen her PCP (Dr. Johnston) who told them that he needs to follow-up with Dr. Crawford about his stent.    I let Ms. Ruvalcaba know that my colleague Kaylan had spoken to Dr. Johnston about a referral that he had sent to our office.  Kaylan had questions about receipt of a referral for an existing patient with a six month follow-up due in March 2025.  Dr. Johnston agreed that referral should be closed and patient should get his imaging that is expected 3/10/2025 with follow-up after imaging.    I gave Ms. Ruvalcaba the numbers to central scheduling to schedule the CUS due 3/10/2025, and requested a call back after imaging is scheduled. Ms. Ruvalcaba acknowledged understanding.

## 2024-12-09 NOTE — TELEPHONE ENCOUNTER
Spoke with patient childLilia. Advised of lab results and recommendations per PCP.   Pt is scheduled to see urology 2/2025. Recommended to contact Dr. Dooley office for sooner availability if possible.   Advised daughter will ask PCP if ok with february appt or prefers pt be seen sooner. Daughter will call office back if can get soon appt or not.     Daughter verbalized understanding.   Labs faxed confirmed to Dr. Dooley office.

## 2024-12-16 RX ORDER — FINASTERIDE 5 MG/1
5 TABLET, FILM COATED ORAL DAILY
Qty: 90 TABLET | Refills: 3 | Status: SHIPPED | OUTPATIENT
Start: 2024-12-16

## 2024-12-16 NOTE — TELEPHONE ENCOUNTER
PCP: Filipe Johnston MD    Last appt: 12/6/2024   Future Appointments   Date Time Provider Department Center   2/12/2025  2:20 PM Lorraine Cortés MD [facility]   3/10/2025  2:00 PM [facility] VASCULAR ROOM 1 [facility]   6/10/2025 11:00 AM Filipe Johnston MD [facility]       Requested Prescriptions     Pending Prescriptions Disp Refills    finasteride (PROSCAR) 5 MG tablet 90 tablet 0     Sig: Take 1 tablet by mouth daily

## 2024-12-16 NOTE — TELEPHONE ENCOUNTER
Caller requests Refill of:    finasteride (PROSCAR) 5 MG table     Please send to:    [facility] #[id number] - [place], [place] - [address] - P [phone] - F [phone]  [address]  [place] [facility] [place] [address]  Phone: [phone] Fax: [phone]         Visit / Appointment History:  Future Appointment at [facility]:  6/10/2025   Last Appointment With PCP:  12/6/2024       Caller confirmed instructions and dosages as correct.    Caller was advised that Meds will be refilled as soon as possible, however there can be a 48-72 business hour turn around on refill requests.

## 2025-02-12 ENCOUNTER — TELEPHONE (OUTPATIENT)
Age: 89
End: 2025-02-12

## 2025-02-12 NOTE — TELEPHONE ENCOUNTER
Left message for patient with rescheduled appointment info. Reschedule from 02/12- power outage.     Future Appointments   Date Time Provider Department Center   2/14/2025  2:40 PM Lorraine Cortés MD [facility]   3/10/2025  2:00 PM [facility]   3/20/2025  1:30 PM Lawrence Crawford MD [facility] BS [facility]   6/10/2025 11:00 AM [facility] Johnston [facility]

## 2025-02-14 ENCOUNTER — OFFICE VISIT (OUTPATIENT)
Age: 89
End: 2025-02-14
Payer: MEDICARE

## 2025-02-14 VITALS
DIASTOLIC BLOOD PRESSURE: 62 MMHG | SYSTOLIC BLOOD PRESSURE: 130 MMHG | RESPIRATION RATE: 16 BRPM | BODY MASS INDEX: 31.1 KG/M2 | WEIGHT: 210 LBS | HEART RATE: 70 BPM | OXYGEN SATURATION: 100 % | HEIGHT: 69 IN

## 2025-02-14 DIAGNOSIS — Q24.8 LEFT VENTRICULAR OUTFLOW TRACT OBSTRUCTION: ICD-10-CM

## 2025-02-14 DIAGNOSIS — Z95.1 S/P CABG (CORONARY ARTERY BYPASS GRAFT): ICD-10-CM

## 2025-02-14 DIAGNOSIS — I25.10 CORONARY ARTERY DISEASE INVOLVING NATIVE CORONARY ARTERY OF NATIVE HEART WITHOUT ANGINA PECTORIS: Primary | ICD-10-CM

## 2025-02-14 DIAGNOSIS — I10 ESSENTIAL (PRIMARY) HYPERTENSION: ICD-10-CM

## 2025-02-14 DIAGNOSIS — I21.A1 TYPE 2 MI (MYOCARDIAL INFARCTION) (HCC): ICD-10-CM

## 2025-02-14 DIAGNOSIS — I21.4 NSTEMI (NON-ST ELEVATED MYOCARDIAL INFARCTION) (HCC): ICD-10-CM

## 2025-02-14 DIAGNOSIS — I25.10 CORONARY ARTERY DISEASE INVOLVING NATIVE CORONARY ARTERY OF NATIVE HEART WITHOUT ANGINA PECTORIS: ICD-10-CM

## 2025-02-14 DIAGNOSIS — E78.00 HYPERCHOLESTEREMIA: ICD-10-CM

## 2025-02-14 DIAGNOSIS — I67.9 CEREBRAL VASCULAR DISEASE: ICD-10-CM

## 2025-02-14 PROCEDURE — 99214 OFFICE O/P EST MOD 30 MIN: CPT | Performed by: SPECIALIST

## 2025-02-14 PROCEDURE — G8417 CALC BMI ABV UP PARAM F/U: HCPCS | Performed by: SPECIALIST

## 2025-02-14 PROCEDURE — 1126F AMNT PAIN NOTED NONE PRSNT: CPT | Performed by: SPECIALIST

## 2025-02-14 PROCEDURE — G8427 DOCREV CUR MEDS BY ELIG CLIN: HCPCS | Performed by: SPECIALIST

## 2025-02-14 PROCEDURE — 93010 ELECTROCARDIOGRAM REPORT: CPT | Performed by: SPECIALIST

## 2025-02-14 PROCEDURE — 93005 ELECTROCARDIOGRAM TRACING: CPT | Performed by: SPECIALIST

## 2025-02-14 PROCEDURE — 1159F MED LIST DOCD IN RCRD: CPT | Performed by: SPECIALIST

## 2025-02-14 PROCEDURE — 1123F ACP DISCUSS/DSCN MKR DOCD: CPT | Performed by: SPECIALIST

## 2025-02-14 PROCEDURE — 1036F TOBACCO NON-USER: CPT | Performed by: SPECIALIST

## 2025-02-14 ASSESSMENT — PATIENT HEALTH QUESTIONNAIRE - PHQ9
SUM OF ALL RESPONSES TO PHQ QUESTIONS 1-9: 0
SUM OF ALL RESPONSES TO PHQ QUESTIONS 1-9: 0
SUM OF ALL RESPONSES TO PHQ9 QUESTIONS 1 & 2: 0
1. LITTLE INTEREST OR PLEASURE IN DOING THINGS: NOT AT ALL
SUM OF ALL RESPONSES TO PHQ QUESTIONS 1-9: 0
2. FEELING DOWN, DEPRESSED OR HOPELESS: NOT AT ALL
SUM OF ALL RESPONSES TO PHQ QUESTIONS 1-9: 0

## 2025-02-14 NOTE — PROGRESS NOTES
Cardio-Selective       metoprolol succinate (TOPROL XL) 25 MG extended release tablet Take 1 tablet by mouth daily       HMG CoA Reductase Inhibitors       atorvastatin (LIPITOR) 40 MG tablet Take 1 tablet by mouth daily       Salicylates       aspirin 81 MG chewable tablet Take 1 tablet by mouth daily           2. Type 2 MI (myocardial infarction) (HCC)  2024 UTI troponin to 800.  This is an elevated significant but not profoundly high level.  He has no chest pain.  He has a high risk for residual disease.   Stress nuclear 5/22/2024 showed no evidence of ischemia normal perfusion with EF 62%.  Pain-free continue medical management    3. Left ventricular outflow tract obstruction  I think it is more likely to epicardial disease rather then outlfow flow tract obstruction but that certainly also a possibility as this source of the type II MI.  Has hyperdynamic LV function and a dynamic obstructive outlet.  Avoid dehydration or tachcyardia.   Murmur unchanged and he has a mid-cavity obstruction. .    EKG with T wave inverted previously       4. Essential (primary) hypertension  At goal , meds and possible side effects reviewed and patient denies  Elevated creatinine will check labs   Orders Placed This Encounter   Procedures    Basic Metabolic Panel    Brain Natriuretic Peptide    Basic Metabolic Panel    Brain Natriuretic Peptide    EKG 12 Lead      Hypertension Medications       Beta Blockers Cardio-Selective       metoprolol succinate (TOPROL XL) 25 MG extended release tablet Take 1 tablet by mouth daily           BP Readings from Last 6 Encounters:   02/14/25 130/62   12/06/24 138/70   07/23/24 136/72   07/17/24 124/66   06/07/24 120/62   05/22/24 (!) 140/80        Lab Results   Component Value Date/Time     12/06/2024 12:05 PM    K 4.6 12/06/2024 12:05 PM     12/06/2024 12:05 PM    CO2 29 12/06/2024 12:05 PM    BUN 18 12/06/2024 12:05 PM    CREATININE 1.49 12/06/2024 12:05 PM    GLUCOSE 81 12/06/2024

## 2025-02-14 NOTE — PATIENT INSTRUCTIONS
Please have lab work done today. We will call with results.    Follow up with the Nurse Practitioner on 6 months and have lab work a few days before your appointment.

## 2025-02-15 LAB
ANION GAP SERPL CALC-SCNC: 8 MMOL/L (ref 2–12)
BUN SERPL-MCNC: 14 MG/DL (ref 6–20)
BUN/CREAT SERPL: 9 (ref 12–20)
CALCIUM SERPL-MCNC: 9.1 MG/DL (ref 8.5–10.1)
CHLORIDE SERPL-SCNC: 105 MMOL/L (ref 97–108)
CO2 SERPL-SCNC: 25 MMOL/L (ref 21–32)
CREAT SERPL-MCNC: 1.54 MG/DL (ref 0.7–1.3)
GLUCOSE SERPL-MCNC: 95 MG/DL (ref 65–100)
NT PRO BNP: 317 PG/ML
POTASSIUM SERPL-SCNC: 4.6 MMOL/L (ref 3.5–5.1)
SODIUM SERPL-SCNC: 138 MMOL/L (ref 136–145)

## 2025-02-16 NOTE — RESULT ENCOUNTER NOTE
Let Yosef know that his lab work is stable.  He has a favorable sodium potassium.  The good news is his BNP is quite low it means his heart f function is quite good.  The creatinine bears watching at 1.5 is similar to what it was about 2 months ago and I think can just be followed after his next NP visit as below  Asher can wait and then order a BMP in August if another 1 is not already done by his other physicians  Future Appointments  3/10/2025  2:00 PM    [facility]ULAR ROOM 1        [facility]  3/20/2025  1:30 PM    Lawrence Crawford MD      [facility]                 BS AMB  6/10/2025  11:00 AM   Filipe Johnston MD           [facility]                BS ECC DEP  8/18/2025  11:00 AM   Nichole Guillermo APRN - [facility]              BS AMB

## 2025-02-17 ENCOUNTER — TELEPHONE (OUTPATIENT)
Age: 89
End: 2025-02-17

## 2025-02-17 NOTE — TELEPHONE ENCOUNTER
Attempted to reach patient by telephone. A message was left for return call.     Labs mailed to home address.     ----- Message from Dr. Lorraine Cortés MD sent at 2/16/2025  3:22 PM EST -----  Let Yosef know that his lab work is stable.  He has a favorable sodium potassium.  The good news is his BNP is quite low it means his heart f function is quite good.  The creatinine bears watching at 1.5 is similar to what it was about 2 months ago and I think can just be followed after his next NP visit as below  Asher can wait and then order a BMP in August if another 1 is not already done by his other physicians  Future Appointments  3/10/2025  2:00 PM    [facility]  3/20/2025  1:30 PM    Lawrence Crawford MD [facility]                 BS AMB  6/10/2025  11:00 AM   Filipe Johnston MD           [facility]                BS ECC DEP  8/18/2025  11:00 AM   Nichole Guillermo APRN - [facility]              BS AMB

## 2025-03-13 ENCOUNTER — TELEPHONE (OUTPATIENT)
Age: 89
End: 2025-03-13

## 2025-03-13 NOTE — TELEPHONE ENCOUNTER
I reached out to the pt and  left a vm informing  him that he missed his imaging that was supposed to be done on 3/10/2025 and I advised him to have that imaging done before Tuesday next week 3/18/2025, otherwise our office will have to cancel his appt on 3/20/2025, because the doctor needs for him to have that imaging done before he comes in to see him. 3/13/2025.

## 2025-03-17 ENCOUNTER — HOSPITAL ENCOUNTER (OUTPATIENT)
Facility: HOSPITAL | Age: 89
Discharge: HOME | End: 2025-03-19
Payer: MEDICARE

## 2025-03-17 DIAGNOSIS — I65.23 BILATERAL CAROTID ARTERY STENOSIS: ICD-10-CM

## 2025-03-17 LAB
VAS LEFT CCA DIST EDV: 13 CM/S
VAS LEFT CCA DIST PSV: 76.9 CM/S
VAS LEFT CCA PROX EDV: 16.7 CM/S
VAS LEFT CCA PROX PSV: 174.6 CM/S
VAS LEFT ECA EDV: 9.3 CM/S
VAS LEFT ECA PSV: 164.7 CM/S
VAS LEFT ICA DIST EDV: 7.5 CM/S
VAS LEFT ICA DIST PSV: 41 CM/S
VAS LEFT ICA MID EDV: 12.6 CM/S
VAS LEFT ICA MID PSV: 57.4 CM/S
VAS LEFT ICA PROX EDV: 14.2 CM/S
VAS LEFT ICA PROX PSV: 58.4 CM/S
VAS LEFT ICA/CCA PSV: 0.8 NO UNITS
VAS LEFT SUBCLAVIAN PROX EDV: 0 CM/S
VAS LEFT SUBCLAVIAN PROX PSV: 109.5 CM/S
VAS LEFT VERTEBRAL EDV: 10.4 CM/S
VAS LEFT VERTEBRAL PSV: 36 CM/S
VAS RIGHT CCA DIST EDV: 14.2 CM/S
VAS RIGHT CCA DIST PSV: 95.3 CM/S
VAS RIGHT CCA PROX EDV: 17.3 CM/S
VAS RIGHT CCA PROX PSV: 151.2 CM/S
VAS RIGHT ECA EDV: 0 CM/S
VAS RIGHT ECA PSV: 101.6 CM/S
VAS RIGHT ICA DIST EDV: 13.9 CM/S
VAS RIGHT ICA DIST PSV: 64.8 CM/S
VAS RIGHT ICA MID EDV: 11.9 CM/S
VAS RIGHT ICA MID PSV: 46.8 CM/S
VAS RIGHT ICA PROX EDV: 11.8 CM/S
VAS RIGHT ICA PROX PSV: 57.2 CM/S
VAS RIGHT ICA/CCA PSV: 0.7 NO UNITS
VAS RIGHT SUBCLAVIAN PROX EDV: 0 CM/S
VAS RIGHT SUBCLAVIAN PROX PSV: 133.7 CM/S
VAS RIGHT VERTEBRAL EDV: 6.9 CM/S
VAS RIGHT VERTEBRAL PSV: 21.9 CM/S

## 2025-03-17 PROCEDURE — 93880 EXTRACRANIAL BILAT STUDY: CPT | Performed by: SURGERY

## 2025-03-17 PROCEDURE — 93880 EXTRACRANIAL BILAT STUDY: CPT

## 2025-03-18 ENCOUNTER — TELEPHONE (OUTPATIENT)
Age: 89
End: 2025-03-18

## 2025-03-20 ENCOUNTER — OFFICE VISIT (OUTPATIENT)
Age: 89
End: 2025-03-20
Payer: MEDICARE

## 2025-03-20 VITALS
SYSTOLIC BLOOD PRESSURE: 120 MMHG | HEIGHT: 69 IN | DIASTOLIC BLOOD PRESSURE: 80 MMHG | WEIGHT: 201.6 LBS | TEMPERATURE: 97.9 F | HEART RATE: 79 BPM | BODY MASS INDEX: 29.86 KG/M2

## 2025-03-20 DIAGNOSIS — I65.23 BILATERAL CAROTID ARTERY STENOSIS: Primary | ICD-10-CM

## 2025-03-20 PROCEDURE — 99214 OFFICE O/P EST MOD 30 MIN: CPT | Performed by: RADIOLOGY

## 2025-03-20 PROCEDURE — G8417 CALC BMI ABV UP PARAM F/U: HCPCS | Performed by: RADIOLOGY

## 2025-03-20 PROCEDURE — 1123F ACP DISCUSS/DSCN MKR DOCD: CPT | Performed by: RADIOLOGY

## 2025-03-20 PROCEDURE — 1036F TOBACCO NON-USER: CPT | Performed by: RADIOLOGY

## 2025-03-20 PROCEDURE — 1159F MED LIST DOCD IN RCRD: CPT | Performed by: RADIOLOGY

## 2025-03-20 PROCEDURE — G8427 DOCREV CUR MEDS BY ELIG CLIN: HCPCS | Performed by: RADIOLOGY

## 2025-03-20 NOTE — PROGRESS NOTES
Follow up for Bilateral Carotid artery stenosis and imaging review.  Daughter at visit.  Patient reports no symptoms.  Denies headaches, neck pain or stiffness, numbness or tingling, dizziness.  No new problems reported.

## 2025-03-20 NOTE — PROGRESS NOTES
Clinic Note      Patient: Yosef Ruvalcaba MRN: [id number]  SSN: [id number]    YOB: 1935  Age: 89 y.o.  Sex: male      Subjective:      Yosef Ruvalcaba is a 89 y.o. male established patient with history of hypertension, hyperlipidemia, CAD status post CABG in 2000, and stroke caused by symptomatic right carotid stenosis which was treated with angioplasty and stenting on 3/29/2024 who presents for imaging follow-up.    Patient has been doing well since his last visit.  He denies headaches and any focal neurological symptoms.  He has been taking aspirin daily.    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 The ejection fraction was 50-60%    CORONARY ARTERY BYPASS GRAFT  2000    ECHO 2D ADULT  2001    LVH, normal LV wall motion and ejection fraction and no significant valvular pathology.    IR CAROTID STENT W PROTECTION  3/29/2024    IR CAROTID STENT W PROTECTION 3/29/2024 [facility] RAD ANGIO IR      Family History   Family history unknown: Yes     Social History     Tobacco Use    Smoking status: Never    Smokeless tobacco: Never   Substance Use Topics    Alcohol use: No      Current Outpatient Medications   Medication Sig Dispense Refill    finasteride (PROSCAR) 5 MG tablet Take 1 tablet by mouth daily 90 tablet 3    folic acid (FOLVITE) 1 MG tablet Take 1 tablet by mouth daily 90 tablet 3    tamsulosin (FLOMAX) 0.4 MG capsule Take 1 capsule by mouth daily 90 capsule 3    metoprolol succinate (TOPROL XL) 25 MG extended release tablet Take 1 tablet by mouth daily 90 tablet 3    atorvastatin (LIPITOR) 40

## 2025-04-21 ENCOUNTER — TELEPHONE (OUTPATIENT)
Age: 89
End: 2025-04-21

## 2025-06-10 ENCOUNTER — OFFICE VISIT (OUTPATIENT)
Age: 89
End: 2025-06-10
Payer: MEDICARE

## 2025-06-10 VITALS
OXYGEN SATURATION: 97 % | BODY MASS INDEX: 29.41 KG/M2 | HEIGHT: 69 IN | TEMPERATURE: 97.7 F | HEART RATE: 96 BPM | DIASTOLIC BLOOD PRESSURE: 62 MMHG | SYSTOLIC BLOOD PRESSURE: 124 MMHG | RESPIRATION RATE: 18 BRPM | WEIGHT: 198.6 LBS

## 2025-06-10 DIAGNOSIS — Z86.73 HISTORY OF CEREBROVASCULAR ACCIDENT: ICD-10-CM

## 2025-06-10 DIAGNOSIS — I25.10 CORONARY ARTERY DISEASE INVOLVING NATIVE CORONARY ARTERY OF NATIVE HEART WITHOUT ANGINA PECTORIS: ICD-10-CM

## 2025-06-10 DIAGNOSIS — C61 PROSTATE CANCER (HCC): ICD-10-CM

## 2025-06-10 DIAGNOSIS — I10 HYPERTENSION, UNSPECIFIED TYPE: Primary | ICD-10-CM

## 2025-06-10 DIAGNOSIS — N18.30 STAGE 3 CHRONIC KIDNEY DISEASE, UNSPECIFIED WHETHER STAGE 3A OR 3B CKD (HCC): ICD-10-CM

## 2025-06-10 DIAGNOSIS — R41.3 MEMORY LOSS: ICD-10-CM

## 2025-06-10 PROCEDURE — 1159F MED LIST DOCD IN RCRD: CPT | Performed by: INTERNAL MEDICINE

## 2025-06-10 PROCEDURE — 99214 OFFICE O/P EST MOD 30 MIN: CPT | Performed by: INTERNAL MEDICINE

## 2025-06-10 PROCEDURE — 1126F AMNT PAIN NOTED NONE PRSNT: CPT | Performed by: INTERNAL MEDICINE

## 2025-06-10 PROCEDURE — 1036F TOBACCO NON-USER: CPT | Performed by: INTERNAL MEDICINE

## 2025-06-10 PROCEDURE — G8427 DOCREV CUR MEDS BY ELIG CLIN: HCPCS | Performed by: INTERNAL MEDICINE

## 2025-06-10 PROCEDURE — 1123F ACP DISCUSS/DSCN MKR DOCD: CPT | Performed by: INTERNAL MEDICINE

## 2025-06-10 PROCEDURE — G8417 CALC BMI ABV UP PARAM F/U: HCPCS | Performed by: INTERNAL MEDICINE

## 2025-06-10 SDOH — ECONOMIC STABILITY: FOOD INSECURITY: WITHIN THE PAST 12 MONTHS, YOU WORRIED THAT YOUR FOOD WOULD RUN OUT BEFORE YOU GOT MONEY TO BUY MORE.: NEVER TRUE

## 2025-06-10 SDOH — ECONOMIC STABILITY: FOOD INSECURITY: WITHIN THE PAST 12 MONTHS, THE FOOD YOU BOUGHT JUST DIDN'T LAST AND YOU DIDN'T HAVE MONEY TO GET MORE.: NEVER TRUE

## 2025-06-10 NOTE — PROGRESS NOTES
Chief Complaint   Patient presents with    6 Month Follow-Up     \"Have you been to the ER, urgent care clinic since your last visit?  Hospitalized since your last visit?\"    NO    “Have you seen or consulted any other health care providers outside of [facility] since your last visit?”    NO            
of Systems     Physical Exam  Constitutional:       Appearance: Normal appearance.   HENT:      Head: Normocephalic and atraumatic.      Right Ear: Tympanic membrane normal.      Left Ear: Tympanic membrane normal.      Nose: Nose normal.      Mouth/Throat:      Mouth: Mucous membranes are moist.   Eyes:      Pupils: Pupils are equal, round, and reactive to light.   Cardiovascular:      Rate and Rhythm: Normal rate and regular rhythm.      Pulses: Normal pulses.      Heart sounds: Normal heart sounds.   Pulmonary:      Effort: Pulmonary effort is normal.   Abdominal:      General: Abdomen is flat.   Musculoskeletal:         General: Normal range of motion.      Cervical back: Normal range of motion and neck supple.   Skin:     General: Skin is warm.   Neurological:      General: No focal deficit present.      Mental Status: He is alert.   Psychiatric:         Mood and Affect: Mood normal.         Thought Content: Thought content normal.         Judgment: Judgment normal.          ASSESSMENT and PLAN  There are no diagnoses linked to this encounter.   Yosef was seen today for 6 month follow-up.    Diagnoses and all orders for this visit:    Hypertension, unspecified type   controlled  Prostate cancer (HCC)   On ADT-Firmagon per [facility] now  Stage 3 chronic kidney disease, unspecified whether stage 3a or 3b CKD (HCC)  -     Basic Metabolic Panel; Future  -     Basic Metabolic Panel    Memory loss   Passed serial 3 and clock drawing    Monitor for now   Likely vascular d/t prior CVA and age  History of cerebrovascular accident   On aspirin and statin, bp controlled   Good recovery  Coronary artery disease involving native coronary artery of native heart without angina pectoris   No angina   Fu CARD    RTC 6months RICH Johnston MD

## 2025-06-11 ENCOUNTER — RESULTS FOLLOW-UP (OUTPATIENT)
Age: 89
End: 2025-06-11

## 2025-06-11 LAB
ANION GAP SERPL CALC-SCNC: 7 MMOL/L (ref 2–12)
BUN SERPL-MCNC: 19 MG/DL (ref 6–20)
BUN/CREAT SERPL: 14 (ref 12–20)
CALCIUM SERPL-MCNC: 9.2 MG/DL (ref 8.5–10.1)
CHLORIDE SERPL-SCNC: 107 MMOL/L (ref 97–108)
CO2 SERPL-SCNC: 24 MMOL/L (ref 21–32)
CREAT SERPL-MCNC: 1.37 MG/DL (ref 0.7–1.3)
GLUCOSE SERPL-MCNC: 114 MG/DL (ref 65–100)
POTASSIUM SERPL-SCNC: 4.3 MMOL/L (ref 3.5–5.1)
SODIUM SERPL-SCNC: 138 MMOL/L (ref 136–145)

## 2025-06-16 ENCOUNTER — APPOINTMENT (OUTPATIENT)
Facility: HOSPITAL | Age: 89
End: 2025-06-16
Payer: MEDICARE

## 2025-06-16 ENCOUNTER — HOSPITAL ENCOUNTER (EMERGENCY)
Facility: HOSPITAL | Age: 89
Discharge: HOME | End: 2025-06-16
Attending: STUDENT IN AN ORGANIZED HEALTH CARE EDUCATION/TRAINING PROGRAM
Payer: MEDICARE

## 2025-06-16 VITALS
DIASTOLIC BLOOD PRESSURE: 71 MMHG | RESPIRATION RATE: 18 BRPM | TEMPERATURE: 97.7 F | SYSTOLIC BLOOD PRESSURE: 154 MMHG | OXYGEN SATURATION: 99 % | HEART RATE: 66 BPM

## 2025-06-16 DIAGNOSIS — R41.0 CONFUSION: Primary | ICD-10-CM

## 2025-06-16 LAB
ALBUMIN SERPL-MCNC: 2.8 G/DL (ref 3.5–5)
ALBUMIN/GLOB SERPL: 0.7 (ref 1.1–2.2)
ALP SERPL-CCNC: 169 U/L (ref 45–117)
ALT SERPL-CCNC: 29 U/L (ref 12–78)
ANION GAP SERPL CALC-SCNC: 6 MMOL/L (ref 2–12)
APPEARANCE UR: CLEAR
AST SERPL-CCNC: 31 U/L (ref 15–37)
BACTERIA URNS QL MICRO: ABNORMAL /HPF
BASOPHILS # BLD: 0.05 K/UL (ref 0–0.1)
BASOPHILS NFR BLD: 0.5 % (ref 0–1)
BILIRUB SERPL-MCNC: 0.6 MG/DL (ref 0.2–1)
BILIRUB UR QL: NEGATIVE
BUN SERPL-MCNC: 23 MG/DL (ref 6–20)
BUN/CREAT SERPL: 15 (ref 12–20)
CALCIUM SERPL-MCNC: 9.1 MG/DL (ref 8.5–10.1)
CHLORIDE SERPL-SCNC: 108 MMOL/L (ref 97–108)
CO2 SERPL-SCNC: 22 MMOL/L (ref 21–32)
COLOR UR: ABNORMAL
CREAT SERPL-MCNC: 1.53 MG/DL (ref 0.7–1.3)
DIFFERENTIAL METHOD BLD: ABNORMAL
EOSINOPHIL # BLD: 0.23 K/UL (ref 0–0.4)
EOSINOPHIL NFR BLD: 2.5 % (ref 0–7)
EPITH CASTS URNS QL MICRO: ABNORMAL /LPF
ERYTHROCYTE [DISTWIDTH] IN BLOOD BY AUTOMATED COUNT: 13.1 % (ref 11.5–14.5)
GLOBULIN SER CALC-MCNC: 4.3 G/DL (ref 2–4)
GLUCOSE SERPL-MCNC: 99 MG/DL (ref 65–100)
GLUCOSE UR STRIP.AUTO-MCNC: NEGATIVE MG/DL
HCT VFR BLD AUTO: 44.1 % (ref 36.6–50.3)
HGB BLD-MCNC: 13.9 G/DL (ref 12.1–17)
HGB UR QL STRIP: NEGATIVE
HYALINE CASTS URNS QL MICRO: ABNORMAL /LPF (ref 0–2)
IMM GRANULOCYTES # BLD AUTO: 0.03 K/UL (ref 0–0.04)
IMM GRANULOCYTES NFR BLD AUTO: 0.3 % (ref 0–0.5)
KETONES UR QL STRIP.AUTO: ABNORMAL MG/DL
LEUKOCYTE ESTERASE UR QL STRIP.AUTO: NEGATIVE
LYMPHOCYTES # BLD: 2.27 K/UL (ref 0.8–3.5)
LYMPHOCYTES NFR BLD: 24.6 % (ref 12–49)
MCH RBC QN AUTO: 28.8 PG (ref 26–34)
MCHC RBC AUTO-ENTMCNC: 31.5 G/DL (ref 30–36.5)
MCV RBC AUTO: 91.3 FL (ref 80–99)
MONOCYTES # BLD: 1.01 K/UL (ref 0–1)
MONOCYTES NFR BLD: 11 % (ref 5–13)
NEUTS SEG # BLD: 5.62 K/UL (ref 1.8–8)
NEUTS SEG NFR BLD: 61.1 % (ref 32–75)
NITRITE UR QL STRIP.AUTO: NEGATIVE
NRBC # BLD: 0 K/UL (ref 0–0.01)
NRBC BLD-RTO: 0 PER 100 WBC
PH UR STRIP: 5.5 (ref 5–8)
PLATELET # BLD AUTO: 156 K/UL (ref 150–400)
PMV BLD AUTO: 10.7 FL (ref 8.9–12.9)
POTASSIUM SERPL-SCNC: 4.8 MMOL/L (ref 3.5–5.1)
PROT SERPL-MCNC: 7.1 G/DL (ref 6.4–8.2)
PROT UR STRIP-MCNC: ABNORMAL MG/DL
RBC # BLD AUTO: 4.83 M/UL (ref 4.1–5.7)
RBC #/AREA URNS HPF: ABNORMAL /HPF (ref 0–5)
SODIUM SERPL-SCNC: 136 MMOL/L (ref 136–145)
SP GR UR REFRACTOMETRY: 1.02
TSH SERPL DL<=0.05 MIU/L-ACNC: 1.5 UIU/ML (ref 0.36–3.74)
URINE CULTURE IF INDICATED: ABNORMAL
UROBILINOGEN UR QL STRIP.AUTO: 1 EU/DL (ref 0.2–1)
WBC # BLD AUTO: 9.2 K/UL (ref 4.1–11.1)
WBC URNS QL MICRO: ABNORMAL /HPF (ref 0–4)

## 2025-06-16 PROCEDURE — 99284 EMERGENCY DEPT VISIT MOD MDM: CPT

## 2025-06-16 PROCEDURE — 84443 ASSAY THYROID STIM HORMONE: CPT

## 2025-06-16 PROCEDURE — 70450 CT HEAD/BRAIN W/O DYE: CPT

## 2025-06-16 PROCEDURE — 80053 COMPREHEN METABOLIC PANEL: CPT

## 2025-06-16 PROCEDURE — 36415 COLL VENOUS BLD VENIPUNCTURE: CPT

## 2025-06-16 PROCEDURE — 81001 URINALYSIS AUTO W/SCOPE: CPT

## 2025-06-16 PROCEDURE — 71045 X-RAY EXAM CHEST 1 VIEW: CPT

## 2025-06-16 PROCEDURE — 85025 COMPLETE CBC W/AUTO DIFF WBC: CPT

## 2025-06-16 ASSESSMENT — PAIN SCALES - GENERAL: PAINLEVEL_OUTOF10: 0

## 2025-06-16 NOTE — ED PROVIDER NOTES
[facility] EMERGENCY DEPARTMENT  EMERGENCY DEPARTMENT ENCOUNTER       Pt Name: Yosef Ruvalcaba  MRN: [id number]  Birthdate 1935  Date of evaluation: 6/16/2025  Provider: Ceferino Rivas MD   PCP: Filipe Johnston MD  Note Started: 3:44 PM 6/16/25     CHIEF COMPLAINT       Chief Complaint   Patient presents with    Altered Mental Status     Patient ambulatory to triage with daughter, daughter reports yesterday at 0900am patient was more fatigued then normal and not acting himself. Patient AxO 4 at this time. Patient denies any CP, SOB, numbness or tingling.         HISTORY OF PRESENT ILLNESS: 1 or more elements      History From: Patient and Patient's Daughter  None     Yosef Ruvalcaba is a 89 y.o. male who presents to the emergency department for increased confusion/fatigue.  Patient's daughter reports that around yesterday at 9 AM patient was noted to be more fatigued and confused, she states he was not acting like himself.  Patient denies any complaints.  Denies any focal numbness, weakness, vision changes, gait changes.     Nursing Notes were all reviewed and agreed with or any disagreements were addressed in the HPI.     REVIEW OF SYSTEMS      Review of Systems     Positives and Pertinent negatives as per HPI.    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 The ejection fraction was 50-60%    CORONARY ARTERY BYPASS GRAFT  2000    ECHO 2D ADULT  2001    LVH, normal LV wall motion and ejection fraction and no significant valvular pathology.    IR CAROTID

## 2025-06-16 NOTE — DISCHARGE INSTRUCTIONS
Thank You!    It was a pleasure taking care of you in our Emergency Department today. We know that when you come to our Emergency Department, you are entrusting us with your health, comfort, and safety. Our physicians and nurses honor that trust, and truly appreciate the opportunity to care for you and your loved ones.      We also value your feedback. If you receive a survey about your Emergency Department experience today, please fill it out.  We care about our patients' feedback, and we listen to what you have to say.  Thank you.    Ceferino Rivas MD  ________________________________________________________________________  I have included a copy of your lab results and/or radiologic studies from today's visit so you can have them easily available at your follow-up visit. We hope you feel better and please do not hesitate to contact the ED if you have any questions at all!    Recent Results (from the past 12 hours)   Comprehensive Metabolic Panel    Collection Time: 06/16/25  2:03 PM   Result Value Ref Range    Sodium 136 136 - 145 mmol/L    Potassium 4.8 3.5 - 5.1 mmol/L    Chloride 108 97 - 108 mmol/L    CO2 22 21 - 32 mmol/L    Anion Gap 6 2 - 12 mmol/L    Glucose 99 65 - 100 mg/dL    BUN 23 (H) 6 - 20 MG/DL    Creatinine 1.53 (H) 0.70 - 1.30 MG/DL    BUN/Creatinine Ratio 15 12 - 20      Est, Glom Filt Rate 43 (L) >60 ml/min/1.73m2    Calcium 9.1 8.5 - 10.1 MG/DL    Total Bilirubin 0.6 0.2 - 1.0 MG/DL    ALT 29 12 - 78 U/L    AST 31 15 - 37 U/L    Alk Phosphatase 169 (H) 45 - 117 U/L    Total Protein 7.1 6.4 - 8.2 g/dL    Albumin 2.8 (L) 3.5 - 5.0 g/dL    Globulin 4.3 (H) 2.0 - 4.0 g/dL    Albumin/Globulin Ratio 0.7 (L) 1.1 - 2.2     TSH    Collection Time: 06/16/25  2:03 PM   Result Value Ref Range    TSH, 3rd Generation 1.50 0.36 - 3.74 uIU/mL   CBC with Auto Differential    Collection Time: 06/16/25  3:03 PM   Result Value Ref Range    WBC 9.2 4.1 - 11.1 K/uL    RBC 4.83 4.10 - 5.70 M/uL    Hemoglobin

## 2025-06-16 NOTE — ED NOTES
DC papers reviewed and in hand, pt verbalized understanding. Patient ambulatory pout of ED with steady gait, wife to provide ride home, no acute distress noted.

## 2025-08-18 ENCOUNTER — OFFICE VISIT (OUTPATIENT)
Age: 89
End: 2025-08-18
Payer: MEDICARE

## 2025-08-18 VITALS
OXYGEN SATURATION: 97 % | HEART RATE: 71 BPM | HEIGHT: 69 IN | BODY MASS INDEX: 29.62 KG/M2 | SYSTOLIC BLOOD PRESSURE: 138 MMHG | WEIGHT: 200 LBS | DIASTOLIC BLOOD PRESSURE: 80 MMHG

## 2025-08-18 DIAGNOSIS — Z95.1 S/P CABG (CORONARY ARTERY BYPASS GRAFT): ICD-10-CM

## 2025-08-18 DIAGNOSIS — I10 ESSENTIAL (PRIMARY) HYPERTENSION: ICD-10-CM

## 2025-08-18 DIAGNOSIS — Q24.8 LEFT VENTRICULAR OUTFLOW TRACT OBSTRUCTION: ICD-10-CM

## 2025-08-18 DIAGNOSIS — E78.00 HYPERCHOLESTEREMIA: ICD-10-CM

## 2025-08-18 DIAGNOSIS — I25.10 CORONARY ARTERY DISEASE INVOLVING NATIVE CORONARY ARTERY OF NATIVE HEART WITHOUT ANGINA PECTORIS: Primary | ICD-10-CM

## 2025-08-18 DIAGNOSIS — I25.10 CORONARY ARTERY DISEASE INVOLVING NATIVE CORONARY ARTERY OF NATIVE HEART WITHOUT ANGINA PECTORIS: ICD-10-CM

## 2025-08-18 LAB
ANION GAP SERPL CALC-SCNC: 9 MMOL/L (ref 2–14)
BUN SERPL-MCNC: 17 MG/DL (ref 8–23)
BUN/CREAT SERPL: 13 (ref 12–20)
CALCIUM SERPL-MCNC: 9 MG/DL (ref 8.8–10.2)
CHLORIDE SERPL-SCNC: 104 MMOL/L (ref 98–107)
CO2 SERPL-SCNC: 26 MMOL/L (ref 20–29)
CREAT SERPL-MCNC: 1.35 MG/DL (ref 0.7–1.2)
GLUCOSE SERPL-MCNC: 111 MG/DL (ref 65–100)
NT PRO BNP: 779 PG/ML (ref 0–450)
POTASSIUM SERPL-SCNC: 4.8 MMOL/L (ref 3.5–5.1)
SODIUM SERPL-SCNC: 139 MMOL/L (ref 136–145)

## 2025-08-18 PROCEDURE — 99214 OFFICE O/P EST MOD 30 MIN: CPT

## 2025-08-18 PROCEDURE — 1036F TOBACCO NON-USER: CPT

## 2025-08-18 PROCEDURE — 1159F MED LIST DOCD IN RCRD: CPT

## 2025-08-18 PROCEDURE — G8417 CALC BMI ABV UP PARAM F/U: HCPCS

## 2025-08-18 PROCEDURE — 1123F ACP DISCUSS/DSCN MKR DOCD: CPT

## 2025-08-18 PROCEDURE — G8427 DOCREV CUR MEDS BY ELIG CLIN: HCPCS

## 2025-08-18 PROCEDURE — 1126F AMNT PAIN NOTED NONE PRSNT: CPT

## 2025-08-18 ASSESSMENT — PATIENT HEALTH QUESTIONNAIRE - PHQ9
SUM OF ALL RESPONSES TO PHQ QUESTIONS 1-9: 0
SUM OF ALL RESPONSES TO PHQ QUESTIONS 1-9: 0
2. FEELING DOWN, DEPRESSED OR HOPELESS: NOT AT ALL
1. LITTLE INTEREST OR PLEASURE IN DOING THINGS: NOT AT ALL
SUM OF ALL RESPONSES TO PHQ QUESTIONS 1-9: 0
SUM OF ALL RESPONSES TO PHQ QUESTIONS 1-9: 0
